# Patient Record
Sex: MALE | Race: BLACK OR AFRICAN AMERICAN | NOT HISPANIC OR LATINO | Employment: OTHER | URBAN - METROPOLITAN AREA
[De-identification: names, ages, dates, MRNs, and addresses within clinical notes are randomized per-mention and may not be internally consistent; named-entity substitution may affect disease eponyms.]

---

## 2017-02-07 ENCOUNTER — ALLSCRIPTS OFFICE VISIT (OUTPATIENT)
Dept: OTHER | Facility: OTHER | Age: 77
End: 2017-02-07

## 2017-02-07 DIAGNOSIS — R80.9 PROTEINURIA: ICD-10-CM

## 2017-08-10 ENCOUNTER — ALLSCRIPTS OFFICE VISIT (OUTPATIENT)
Dept: OTHER | Facility: OTHER | Age: 77
End: 2017-08-10

## 2017-08-10 DIAGNOSIS — R80.9 PROTEINURIA: ICD-10-CM

## 2018-01-13 VITALS
DIASTOLIC BLOOD PRESSURE: 70 MMHG | HEIGHT: 72 IN | RESPIRATION RATE: 16 BRPM | TEMPERATURE: 97.3 F | SYSTOLIC BLOOD PRESSURE: 140 MMHG | HEART RATE: 68 BPM | BODY MASS INDEX: 28.22 KG/M2 | WEIGHT: 208.38 LBS

## 2018-01-14 VITALS
SYSTOLIC BLOOD PRESSURE: 120 MMHG | HEART RATE: 68 BPM | HEIGHT: 72 IN | DIASTOLIC BLOOD PRESSURE: 80 MMHG | BODY MASS INDEX: 28.36 KG/M2 | WEIGHT: 209.38 LBS | RESPIRATION RATE: 16 BRPM | TEMPERATURE: 97.3 F

## 2018-06-12 ENCOUNTER — TELEPHONE (OUTPATIENT)
Dept: NEPHROLOGY | Facility: CLINIC | Age: 78
End: 2018-06-12

## 2018-06-12 NOTE — TELEPHONE ENCOUNTER
Erin Laurent from Dr Samuel King office called and stated patient is having a knee replacement on 7/30/18 and he needs a pre-op clearance but there is nothing in the schedule  Erin Laurent wants to know if Dr Tilford Habermann can clear the patient without seeing him  I did tell Natha Philips Dr Tilford Habermann is not her this week    Elham's phone number is [phone]

## 2018-06-18 RX ORDER — GLIPIZIDE 5 MG/1
1 TABLET ORAL DAILY
COMMUNITY
End: 2018-07-05 | Stop reason: ALTCHOICE

## 2018-06-18 RX ORDER — SIMVASTATIN 20 MG
1 TABLET ORAL DAILY
COMMUNITY
End: 2020-10-28 | Stop reason: SDUPTHER

## 2018-06-18 RX ORDER — METFORMIN HYDROCHLORIDE 500 MG/1
1500 TABLET, EXTENDED RELEASE ORAL
COMMUNITY
Start: 2018-04-02 | End: 2020-03-13 | Stop reason: SDUPTHER

## 2018-06-18 RX ORDER — AMLODIPINE BESYLATE 5 MG/1
TABLET ORAL
COMMUNITY
Start: 2018-03-27

## 2018-06-18 RX ORDER — SIMVASTATIN 20 MG
TABLET ORAL
COMMUNITY
Start: 2018-04-24 | End: 2018-07-05 | Stop reason: SDUPTHER

## 2018-06-18 RX ORDER — APIXABAN 5 MG/1
5 TABLET, FILM COATED ORAL 2 TIMES DAILY
COMMUNITY
Start: 2018-04-10 | End: 2020-02-04 | Stop reason: ALTCHOICE

## 2018-06-18 RX ORDER — ACETAMINOPHEN 500 MG
TABLET ORAL
COMMUNITY
End: 2022-04-16 | Stop reason: ALTCHOICE

## 2018-06-19 NOTE — TELEPHONE ENCOUNTER
He need to be cleared by primary doctor    I can clear him renal point of view once a get recent blood test without seeing him in the office

## 2018-07-02 LAB
CREAT ?TM UR-SCNC: 106 UMOL/L
HBA1C MFR BLD HPLC: 7.9 %
MICROALBUMIN UR-MCNC: 5.6 MG/L (ref 0–20)
MICROALBUMIN/CREAT UR: 53 MG/G{CREAT}

## 2018-07-05 ENCOUNTER — OFFICE VISIT (OUTPATIENT)
Dept: NEPHROLOGY | Facility: CLINIC | Age: 78
End: 2018-07-05
Payer: MEDICARE

## 2018-07-05 DIAGNOSIS — N18.30 STAGE 3 CHRONIC KIDNEY DISEASE (HCC): Primary | ICD-10-CM

## 2018-07-05 DIAGNOSIS — I82.A22 CHRONIC DEEP VEIN THROMBOSIS (DVT) OF AXILLARY VEIN OF LEFT UPPER EXTREMITY (HCC): ICD-10-CM

## 2018-07-05 DIAGNOSIS — E11.9 TYPE 2 DIABETES MELLITUS WITHOUT COMPLICATION, WITHOUT LONG-TERM CURRENT USE OF INSULIN (HCC): ICD-10-CM

## 2018-07-05 DIAGNOSIS — I49.5 SSS (SICK SINUS SYNDROME) (HCC): ICD-10-CM

## 2018-07-05 DIAGNOSIS — Z01.818 PRE-OPERATIVE CLEARANCE: ICD-10-CM

## 2018-07-05 DIAGNOSIS — I10 ESSENTIAL HYPERTENSION: ICD-10-CM

## 2018-07-05 PROBLEM — N18.2 STAGE 2 CHRONIC KIDNEY DISEASE: Status: ACTIVE | Noted: 2017-12-28

## 2018-07-05 PROBLEM — I82.A29 CHRONIC DEEP VEIN THROMBOSIS (DVT) OF AXILLARY VEIN (HCC): Status: ACTIVE | Noted: 2018-07-05

## 2018-07-05 PROCEDURE — 99213 OFFICE O/P EST LOW 20 MIN: CPT | Performed by: INTERNAL MEDICINE

## 2018-07-05 NOTE — PATIENT INSTRUCTIONS
Chronic Kidney Disease   AMBULATORY CARE:   Chronic kidney disease (CKD)  is the gradual and permanent loss of kidney function  Normally, the kidneys remove fluid, chemicals, and waste from your blood  These wastes are turned into urine by your kidneys  CKD may worsen over time and lead to kidney failure  Common symptoms include the following:   · Changes in how often you need to urinate    · Swelling in your arms, legs, or feet    · Shortness of breath    · Fatigue or weakness    · Bad or bitter taste in your mouth    · Nausea, vomiting, or loss of appetite  Seek care immediately if:   · You are confused and very drowsy  · You have a seizure  · You have shortness of breath  Contact your healthcare provider if:   · You suddenly gain or lose more weight than your healthcare provider has told you is okay  · You have itchy skin or a rash  · You urinate more or less than you normally do  · You have blood in your urine  · You have nausea and repeated vomiting  · You have fatigue or muscle weakness  · You have hiccups that will not stop  · You have questions or concerns about your condition or care  Treatment for CKD:  Medicines may be given to decrease blood pressure and get rid of extra fluid  You may also receive medicine to manage health conditions that may occur with CKD  Dialysis is a treatment to remove chemicals and waste from your blood when your kidneys can no longer do this  Surgery may be needed to create an arteriovenous fistula (AVF) in your arm or insert a catheter into your abdomen so that you can receive dialysis  A kidney transplant may be done if your CKD becomes severe  Manage CKD:   · Maintain a healthy weight  Ask your healthcare provider how much you should weigh  Ask him to help you create a weight loss plan if you are overweight  · Exercise 30 to 60 minutes a day, 4 to 7 times a week, or as directed  Ask about the best exercise plan for you   Regular exercise can help you manage CKD, high blood pressure, and diabetes  · Follow your healthcare provider's advice about what to eat and drink  He may tell you to eat food low in sodium (salt), potassium, phosphorus, or protein  You may need to see a dietitian if you need help planning meals  Ask how much liquid to drink each day and which liquids are best for you  · Limit alcohol  Ask how much alcohol is safe for you to drink  A drink of alcohol is 12 ounces of beer, 5 ounces of wine, or 1½ ounces of liquor  · Do not smoke  Nicotine and other chemicals in cigarettes and cigars can cause lung and kidney damage  Ask your healthcare provider for information if you currently smoke and need help to quit  E-cigarettes or smokeless tobacco still contain nicotine  Talk to your healthcare provider before you use these products  · Ask your healthcare provider if you need vaccines  Infections such as pneumonia, influenza, and hepatitis can be more harmful or more likely to occur in a person who has CKD  Vaccines reduce your risk of infection with these viruses  Follow up with your healthcare provider as directed:  Write down your questions so you remember to ask them during your visits  © 2017 [employer] [employer] Information is for End User's use only and may not be sold, redistributed or otherwise used for commercial purposes  All illustrations and images included in CareNotes® are the copyrighted property of [employer]  or [employer]ony [employer]  The above information is an  only  It is not intended as medical advice for individual conditions or treatments  Talk to your doctor, nurse or pharmacist before following any medical regimen to see if it is safe and effective for you

## 2018-07-05 NOTE — LETTER
July 5, 2018     Jameel Rosales MD  [address] [place] [address] 89    Patient: Dalton Ventura   YOB: 1940   Date of Visit: 7/5/2018       Dear Dr Hitesh Washington: Thank you for referring Cassidy Cohn to me for evaluation  Below are my notes for this consultation  If you have questions, please do not hesitate to call me  I look forward to following your patient along with you  Sincerely,        Marcus Collins MD        CC: MD Marcus Barnett MD  7/5/2018 11:41 AM  Sign at close encounter  [address] 70 y o  male MRN: [id number]    Encounter: [id number] 7/5/2018    REASON FOR VISIT: Dalton Ventura is a 70 y o  male who is here on 7/5/2018 for No chief complaint on file  Kacie Garcia HPI:    Denny Millan came in today for renal clearance for his knee replacement surgery  [age] a gentleman who is doing quite well  Other than pain in the knee there is no acute complaint  He does have sick sinus syndrome and has a pacemaker being closely monitored by cardiologist    No acute complaint no urinary or bowel problem no chest pain or palpitation        REVIEW OF SYSTEMS:    Review of Systems   Constitutional: Negative for activity change and fatigue  HENT: Negative for congestion and ear discharge  Eyes: Negative for photophobia and pain  Respiratory: Negative for apnea, choking, chest tightness and shortness of breath  Cardiovascular: Negative for chest pain and palpitations  Gastrointestinal: Negative for abdominal distention and blood in stool  Endocrine: Negative for heat intolerance and polyphagia  Genitourinary: Negative for difficulty urinating, flank pain and urgency  Musculoskeletal: Positive for arthralgias  Negative for neck pain and neck stiffness  Skin: Negative for color change and wound  Allergic/Immunologic: Negative for food allergies and immunocompromised state     Neurological: Negative for seizures, facial asymmetry, light-headedness and headaches  Hematological: Negative for adenopathy  Does not bruise/bleed easily  Psychiatric/Behavioral: Negative for self-injury and suicidal ideas  PAST MEDICAL HISTORY:  No past medical history on file  PAST SURGICAL HISTORY:  No past surgical history on file  SOCIAL HISTORY:  History   Alcohol use Not on file     History   Drug use: Unknown     History   Smoking Status    Not on file   Smokeless Tobacco    Not on file       FAMILY HISTORY:  No family history on file  MEDICATIONS:    Current Outpatient Prescriptions:     acetaminophen (TYLENOL) 500 mg tablet, Take by mouth, Disp: , Rfl:     amLODIPine (NORVASC) 5 mg tablet, , Disp: , Rfl:     ELIQUIS 5 MG, , Disp: , Rfl:     Linagliptin (TRADJENTA) 5 MG TABS, Take 5 mg by mouth daily, Disp: , Rfl:     metFORMIN (GLUCOPHAGE-XR) 500 mg 24 hr tablet, 1,000 mg  , Disp: , Rfl:     Multiple Vitamins-Minerals (MULTIVITAMIN ADULT PO), Take 1 tablet by mouth daily, Disp: , Rfl:     simvastatin (ZOCOR) 20 mg tablet, Take 1 tablet by mouth daily, Disp: , Rfl:     PHYSICAL EXAM:  There were no vitals filed for this visit  There is no height or weight on file to calculate BMI  Physical Exam   Constitutional: He is oriented to person, place, and time  He appears well-developed  No distress  HENT:   Head: Normocephalic  Mouth/Throat: Oropharynx is clear and moist    Eyes: Conjunctivae are normal  No scleral icterus  Neck: Neck supple  No JVD present  Cardiovascular: Normal rate and normal heart sounds  Pulmonary/Chest: Effort normal  He has no wheezes  Abdominal: Soft  There is no tenderness  Musculoskeletal: Normal range of motion  He exhibits no edema  Neurological: He is alert and oriented to person, place, and time  Skin: Skin is warm  No rash noted  Psychiatric: He has a normal mood and affect   His behavior is normal        LAB RESULTS:  No results found for this or any previous visit     ASSESSMENT and PLAN:      Stage 3 chronic kidney disease  Stable at this point with GFR of 56  Likely etiology is hypertensive nephrosclerosis    Pre-operative clearance  Patient is medically stable renal point of view for knee surgery  Advised hydration around surgery  Will advise avoidance of any nonsteroidal pain killer including Mobic        I will see him back in 6 months again      Portions of the record may have been created with voice recognition software  Occasional wrong word or "sound a like" substitutions may have occurred due to the inherent limitations of voice recognition software  Read the chart carefully and recognize, using context, where substitutions have occurred  If you have any questions, please contact the dictating provider

## 2018-07-05 NOTE — PROGRESS NOTES
NEPHROLOGY OFFICE FOLLOW UP  Mark De La Cruz 70 y o  male MRN: [id number]    Encounter: [id number] 7/5/2018    REASON FOR VISIT: Mark De La Cruz is a 70 y o  male who is here on 7/5/2018 for No chief complaint on file  Maddy Red HPI:    Nuvia Goldstein came in today for renal clearance for his knee replacement surgery  [age] a gentleman who is doing quite well  Other than pain in the knee there is no acute complaint  He does have sick sinus syndrome and has a pacemaker being closely monitored by cardiologist    No acute complaint no urinary or bowel problem no chest pain or palpitation        REVIEW OF SYSTEMS:    Review of Systems   Constitutional: Negative for activity change and fatigue  HENT: Negative for congestion and ear discharge  Eyes: Negative for photophobia and pain  Respiratory: Negative for apnea, choking, chest tightness and shortness of breath  Cardiovascular: Negative for chest pain and palpitations  Gastrointestinal: Negative for abdominal distention and blood in stool  Endocrine: Negative for heat intolerance and polyphagia  Genitourinary: Negative for difficulty urinating, flank pain and urgency  Musculoskeletal: Positive for arthralgias  Negative for neck pain and neck stiffness  Skin: Negative for color change and wound  Allergic/Immunologic: Negative for food allergies and immunocompromised state  Neurological: Negative for seizures, facial asymmetry, light-headedness and headaches  Hematological: Negative for adenopathy  Does not bruise/bleed easily  Psychiatric/Behavioral: Negative for self-injury and suicidal ideas  PAST MEDICAL HISTORY:  No past medical history on file  PAST SURGICAL HISTORY:  No past surgical history on file      SOCIAL HISTORY:  History   Alcohol use Not on file     History   Drug use: Unknown     History   Smoking Status    Not on file   Smokeless Tobacco    Not on file       FAMILY HISTORY:  No family history on file     MEDICATIONS:    Current Outpatient Prescriptions:     acetaminophen (TYLENOL) 500 mg tablet, Take by mouth, Disp: , Rfl:     amLODIPine (NORVASC) 5 mg tablet, , Disp: , Rfl:     ELIQUIS 5 MG, , Disp: , Rfl:     Linagliptin (TRADJENTA) 5 MG TABS, Take 5 mg by mouth daily, Disp: , Rfl:     metFORMIN (GLUCOPHAGE-XR) 500 mg 24 hr tablet, 1,000 mg  , Disp: , Rfl:     Multiple Vitamins-Minerals (MULTIVITAMIN ADULT PO), Take 1 tablet by mouth daily, Disp: , Rfl:     simvastatin (ZOCOR) 20 mg tablet, Take 1 tablet by mouth daily, Disp: , Rfl:     PHYSICAL EXAM:  There were no vitals filed for this visit  There is no height or weight on file to calculate BMI  Physical Exam   Constitutional: He is oriented to person, place, and time  He appears well-developed  No distress  HENT:   Head: Normocephalic  Mouth/Throat: Oropharynx is clear and moist    Eyes: Conjunctivae are normal  No scleral icterus  Neck: Neck supple  No JVD present  Cardiovascular: Normal rate and normal heart sounds  Pulmonary/Chest: Effort normal  He has no wheezes  Abdominal: Soft  There is no tenderness  Musculoskeletal: Normal range of motion  He exhibits no edema  Neurological: He is alert and oriented to person, place, and time  Skin: Skin is warm  No rash noted  Psychiatric: He has a normal mood and affect  His behavior is normal        LAB RESULTS:  No results found for this or any previous visit  ASSESSMENT and PLAN:      Stage 3 chronic kidney disease  Stable at this point with GFR of 56  Likely etiology is hypertensive nephrosclerosis    Pre-operative clearance  Patient is medically stable renal point of view for knee surgery  Advised hydration around surgery  Will advise avoidance of any nonsteroidal pain killer including Mobic        I will see him back in 6 months again      Portions of the record may have been created with voice recognition software   Occasional wrong word or "sound a like" substitutions may have occurred due to the inherent limitations of voice recognition software  Read the chart carefully and recognize, using context, where substitutions have occurred  If you have any questions, please contact the dictating provider

## 2018-07-05 NOTE — ASSESSMENT & PLAN NOTE
Patient is medically stable renal point of view for knee surgery  Advised hydration around surgery    Will advise avoidance of any nonsteroidal pain killer including Mobic

## 2018-07-20 LAB
AST SERPL W P-5'-P-CCNC: 12 U/L (ref 5–45)
CHOLEST SERPL-MCNC: 157 MG/DL (ref 50–200)
CREAT ?TM UR-SCNC: 106 UMOL/L
EXT BLOOD, UA: ABNORMAL
EXT GLUCOSE BLD: 157
EXT GLUCOSE, UA: ABNORMAL
EXT KETONES: ABNORMAL
EXT NITRITE, UA: ABNORMAL
EXT PH, UA: 5
EXT PROTEIN, UA: ABNORMAL
EXT SPECIFIC GRAVITY, UA: 1.01
EXTERNAL ALBUMIN: 4.3
EXTERNAL ALK PHOS: 75
EXTERNAL ALT: 13
EXTERNAL ANION GAP: 5
EXTERNAL BICARBONATE: 27
EXTERNAL BUN: 14
EXTERNAL CALCIUM: 9.4
EXTERNAL CHLORIDE: 108
EXTERNAL CREATININE: 1.26
EXTERNAL EGFR: 55
EXTERNAL POTASSIUM: 4
EXTERNAL RBC (UA): ABNORMAL
EXTERNAL SODIUM: 140
EXTERNAL T.BILIRUBIN: 0.6
EXTERNAL TOTAL PROTEIN: 7.6
EXTERNAL WBC (UA): ABNORMAL
HBA1C MFR BLD: 7.9 % (ref 4.2–6.3)
HCT VFR BLD AUTO: 42.5 % (ref 36.5–49.3)
HDLC SERPL-MCNC: 57 MG/DL (ref 40–60)
HGB BLD-MCNC: 14 G/DL (ref 12–17)
LDLC SERPL CALC-MCNC: 85 MG/DL (ref 0–100)
MICROALBUMIN UR-MCNC: 5.6 MG/L (ref 0–20)
MICROALBUMIN/CREAT UR: 53 MG/G{CREAT}
PLATELET # BLD AUTO: 261 THOUSANDS/UL (ref 149–390)
PROTHROMBIN TIME: 12.9 SEC (ref 11.8–14.1)
PSA FREE + TOTAL (HISTORICAL): 1.8
TRIGL SERPL-MCNC: 73 MG/DL (ref ?–150)
WBC # BLD AUTO: 5.5 THOUSAND/UL
WBC # BLD EST: ABNORMAL 10*3/UL

## 2018-10-19 ENCOUNTER — TELEPHONE (OUTPATIENT)
Dept: NEPHROLOGY | Facility: CLINIC | Age: 78
End: 2018-10-19

## 2018-10-19 NOTE — TELEPHONE ENCOUNTER
Called and left message on machine for patient to return our call due to Dr Lesa Velasquez on vacation 1/10/2019

## 2019-01-31 ENCOUNTER — OFFICE VISIT (OUTPATIENT)
Dept: NEPHROLOGY | Facility: CLINIC | Age: 79
End: 2019-01-31
Payer: MEDICARE

## 2019-01-31 VITALS — HEIGHT: 74 IN | BODY MASS INDEX: 25.15 KG/M2 | WEIGHT: 196 LBS | TEMPERATURE: 97.5 F

## 2019-01-31 DIAGNOSIS — I82.A22 CHRONIC DEEP VEIN THROMBOSIS (DVT) OF AXILLARY VEIN OF LEFT UPPER EXTREMITY (HCC): ICD-10-CM

## 2019-01-31 DIAGNOSIS — N18.30 STAGE 3 CHRONIC KIDNEY DISEASE (HCC): Primary | ICD-10-CM

## 2019-01-31 DIAGNOSIS — I10 ESSENTIAL HYPERTENSION: ICD-10-CM

## 2019-01-31 DIAGNOSIS — E11.9 TYPE 2 DIABETES MELLITUS WITHOUT COMPLICATION, WITHOUT LONG-TERM CURRENT USE OF INSULIN (HCC): ICD-10-CM

## 2019-01-31 PROCEDURE — 99213 OFFICE O/P EST LOW 20 MIN: CPT | Performed by: INTERNAL MEDICINE

## 2019-01-31 NOTE — PATIENT INSTRUCTIONS
Chronic Kidney Disease   AMBULATORY CARE:   Chronic kidney disease (CKD)  is the gradual and permanent loss of kidney function  Normally, the kidneys remove fluid, chemicals, and waste from your blood  These wastes are turned into urine by your kidneys  CKD may worsen over time and lead to kidney failure  Common symptoms include the following:   · Changes in how often you need to urinate    · Swelling in your arms, legs, or feet    · Shortness of breath    · Fatigue or weakness    · Bad or bitter taste in your mouth    · Nausea, vomiting, or loss of appetite  Seek care immediately if:   · You are confused and very drowsy  · You have a seizure  · You have shortness of breath  Contact your healthcare provider if:   · You suddenly gain or lose more weight than your healthcare provider has told you is okay  · You have itchy skin or a rash  · You urinate more or less than you normally do  · You have blood in your urine  · You have nausea and repeated vomiting  · You have fatigue or muscle weakness  · You have hiccups that will not stop  · You have questions or concerns about your condition or care  Treatment for CKD:  Medicines may be given to decrease blood pressure and get rid of extra fluid  You may also receive medicine to manage health conditions that may occur with CKD  Dialysis is a treatment to remove chemicals and waste from your blood when your kidneys can no longer do this  Surgery may be needed to create an arteriovenous fistula (AVF) in your arm or insert a catheter into your abdomen so that you can receive dialysis  A kidney transplant may be done if your CKD becomes severe  Manage CKD:   · Maintain a healthy weight  Ask your healthcare provider how much you should weigh  Ask him to help you create a weight loss plan if you are overweight  · Exercise 30 to 60 minutes a day, 4 to 7 times a week, or as directed  Ask about the best exercise plan for you   Regular exercise can help you manage CKD, high blood pressure, and diabetes  · Follow your healthcare provider's advice about what to eat and drink  He may tell you to eat food low in sodium (salt), potassium, phosphorus, or protein  You may need to see a dietitian if you need help planning meals  Ask how much liquid to drink each day and which liquids are best for you  · Limit alcohol  Ask how much alcohol is safe for you to drink  A drink of alcohol is 12 ounces of beer, 5 ounces of wine, or 1½ ounces of liquor  · Do not smoke  Nicotine and other chemicals in cigarettes and cigars can cause lung and kidney damage  Ask your healthcare provider for information if you currently smoke and need help to quit  E-cigarettes or smokeless tobacco still contain nicotine  Talk to your healthcare provider before you use these products  · Ask your healthcare provider if you need vaccines  Infections such as pneumonia, influenza, and hepatitis can be more harmful or more likely to occur in a person who has CKD  Vaccines reduce your risk of infection with these viruses  Follow up with your healthcare provider as directed:  Write down your questions so you remember to ask them during your visits  © 2017 [employer] Information is for End User's use only and may not be sold, redistributed or otherwise used for commercial purposes  All illustrations and images included in CareNotes® are the copyrighted property of [employer]  or [employer]  The above information is an  only  It is not intended as medical advice for individual conditions or treatments  Talk to your doctor, nurse or pharmacist before following any medical regimen to see if it is safe and effective for you

## 2019-01-31 NOTE — LETTER
January 31, 2019     Anaid Gomez MD  [address] [place] [address]    Patient: Khanh Neal   YOB: 1940   Date of Visit: 1/31/2019       Dear Dr Vivien Dallas: Thank you for referring Angelo De to me for evaluation  Below are my notes for this consultation  If you have questions, please do not hesitate to call me  I look forward to following your patient along with you  Sincerely,        Howard Trujillo MD        CC: No Recipients  Howard Trujillo MD  1/31/2019  9:17 AM  Sign at close encounter  [address] [place] [address] 70 y o  male MRN: [id number]    Encounter: [id number] 1/31/2019    REASON FOR VISIT: Khanh Neal is a 70 y o  male who is here on 1/31/2019 for Chronic Kidney Disease    HPI:    [facility] came in today for follow-up of CKD stage 3  Since I saw him last he had knee replacement done  Still complaining of knee pain and overall not feeling better  Denies any other complaint        REVIEW OF SYSTEMS:    Review of Systems   Constitutional: Negative for activity change and fatigue  HENT: Negative for congestion and ear discharge  Eyes: Negative for photophobia and pain  Respiratory: Negative for apnea, cough, choking, chest tightness and shortness of breath  Cardiovascular: Negative for chest pain, palpitations and leg swelling  Gastrointestinal: Negative for abdominal distention, abdominal pain, blood in stool, diarrhea and nausea  Endocrine: Negative for heat intolerance and polyphagia  Genitourinary: Negative for flank pain and urgency  Musculoskeletal: Positive for arthralgias  Negative for neck pain and neck stiffness  Skin: Negative for color change and wound  Allergic/Immunologic: Negative for food allergies and immunocompromised state  Neurological: Negative for seizures and facial asymmetry  Hematological: Negative for adenopathy  Does not bruise/bleed easily     Psychiatric/Behavioral: Negative for self-injury and suicidal ideas  PAST MEDICAL HISTORY:  Past Medical History:   Diagnosis Date    Chronic kidney disease     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)     Diabetes mellitus (HCC)     Hypertension     SSS (sick sinus syndrome) (HCC)        PAST SURGICAL HISTORY:  Past Surgical History:   Procedure Laterality Date    CARDIAC PACEMAKER PLACEMENT      CATARACT EXTRACTION      COLONOSCOPY      CYSTOSTOMY      REPLACEMENT TOTAL KNEE         SOCIAL HISTORY:  History   Alcohol Use No     History   Drug Use No     History   Smoking Status    Former Smoker   Smokeless Tobacco    Never Used       FAMILY HISTORY:  Family History   Problem Relation Age of Onset    No Known Problems Mother     No Known Problems Father        MEDICATIONS:    Current Outpatient Prescriptions:     acetaminophen (TYLENOL) 500 mg tablet, Take by mouth, Disp: , Rfl:     amLODIPine (NORVASC) 5 mg tablet, , Disp: , Rfl:     ELIQUIS 5 MG, Take 5 mg by mouth 2 (two) times a day  , Disp: , Rfl:     metFORMIN (GLUCOPHAGE-XR) 500 mg 24 hr tablet, Take 1,500 mg by mouth daily with breakfast  , Disp: , Rfl:     Multiple Vitamins-Minerals (MULTIVITAMIN ADULT PO), Take 1 tablet by mouth daily, Disp: , Rfl:     saxagliptin (ONGLYZA) 5 MG tablet, Take 5 mg by mouth daily, Disp: , Rfl:     simvastatin (ZOCOR) 20 mg tablet, Take 1 tablet by mouth daily, Disp: , Rfl:     PHYSICAL EXAM:  Vitals:    01/31/19 0836   Temp: 97 5 °F (36 4 °C)   Weight: 88 9 kg (196 lb)   Height: 6' 2" (1 88 m)     Body mass index is 25 16 kg/m²  Physical Exam   Constitutional: He is oriented to person, place, and time  He appears well-developed  No distress  HENT:   Head: Normocephalic  Mouth/Throat: Oropharynx is clear and moist    Eyes: Conjunctivae are normal  No scleral icterus  Neck: Neck supple  No JVD present  Cardiovascular: Normal rate and normal heart sounds      Pulmonary/Chest: Effort normal and breath sounds normal  No respiratory distress  He has no wheezes  He has no rales  Abdominal: Soft  Bowel sounds are normal  He exhibits no distension  There is no tenderness  There is no guarding  Musculoskeletal: Normal range of motion  He exhibits no edema  Neurological: He is alert and oriented to person, place, and time  Skin: Skin is warm  No rash noted  Psychiatric: He has a normal mood and affect  His behavior is normal        LAB RESULTS:  Results for orders placed or performed in visit on 07/20/18   Protime-INR   Result Value Ref Range    PT 12 9 11 8 - 14 1 sec   Comprehensive metabolic panel   Result Value Ref Range    SODIUM 140     POTASSIUM 4 0     CHLORIDE 108     BICARB 27     ANION GAP 5     BUN 14     CREATININE 1 26     Glucose 157     EXTERNAL CALCIUM 9 4     TOTAL PROTEIN 7 6     ALBUMIN 4 3     AST 12 5 - 45 U/L    ALT 13     ALK PHOS 75     EXTERNAL TOT  BILIRUBIN 0 6     EXTERNAL EGFR 55    Hemoglobin A1C   Result Value Ref Range    Hemoglobin A1C 7 9 (A) 4 2 - 6 3 %   Lipid panel   Result Value Ref Range    Cholesterol 157 50 - 200 mg/dL    Triglycerides 73 150 mg/dL    Total HDL  Direct 57 40 - 60 mg/dL    LDL Calculated 85 0 - 100 mg/dL   PSA Total (Reflex To Free)   Result Value Ref Range    PSA FREE + TOTAL 1 80    Microalbumin / creatinine urine ratio   Result Value Ref Range    EXT Creatinine Urine 106 0     Microalbum  ,U,Random 5 6 0 0 - 20 0 mg/L    EXTERNAL Microalb/Creat Ratio 53    Urinalysis with microscopic   Result Value Ref Range    Spec Grav, UA (Ref:1 003-1 030) 1 013     Glucose, UA (Ref: Negative)      Ketones, UA (Ref: Negative) neg     Blood, UA neg     Nitrite, UA (Ref: Negative) neg      Leukocytes, UA (Ref: Negative) neg     pH, UA (Ref: 4 5-8 0) 5 0     Protein, UA (Ref: Negative) 30-70     RBC, UA 3-5     EXTERNAL WBC, UA RARE    CBC   Result Value Ref Range    Hemoglobin 14 0 12 0 - 17 0 g/dL    Hematocrit 42 5 36 5 - 49 3 %    WBC 5 50 Thousand/uL    Platelets 584 206 - 390 Thousands/uL       ASSESSMENT and PLAN:      Stage 3 chronic kidney disease  Stable with GFR of 59-60  Likely etiology is hypertensive nephrosclerosis  Hydration and avoidance of nephrotoxic medicine discussed with him    Essential hypertension  Very well controlled with present medication which I will continue        I will see him back in 1 year again as he is quite stable      Portions of the record may have been created with voice recognition software  Occasional wrong word or "sound a like" substitutions may have occurred due to the inherent limitations of voice recognition software  Read the chart carefully and recognize, using context, where substitutions have occurred  If you have any questions, please contact the dictating provider

## 2019-01-31 NOTE — ASSESSMENT & PLAN NOTE
Stable with GFR of 59-60  Likely etiology is hypertensive nephrosclerosis    Hydration and avoidance of nephrotoxic medicine discussed with him

## 2019-01-31 NOTE — PROGRESS NOTES
NEPHROLOGY OFFICE FOLLOW UP  Glorine Gaucher 70 y o  male MRN: [id number]    Encounter: [id number] 1/31/2019    REASON FOR VISIT: Glorine Gaucher is a 70 y o  male who is here on 1/31/2019 for Chronic Kidney Disease    HPI:    Brit Lopez came in today for follow-up of CKD stage 3  Since I saw him last he had knee replacement done  Still complaining of knee pain and overall not feeling better  Denies any other complaint        REVIEW OF SYSTEMS:    Review of Systems   Constitutional: Negative for activity change and fatigue  HENT: Negative for congestion and ear discharge  Eyes: Negative for photophobia and pain  Respiratory: Negative for apnea, cough, choking, chest tightness and shortness of breath  Cardiovascular: Negative for chest pain, palpitations and leg swelling  Gastrointestinal: Negative for abdominal distention, abdominal pain, blood in stool, diarrhea and nausea  Endocrine: Negative for heat intolerance and polyphagia  Genitourinary: Negative for flank pain and urgency  Musculoskeletal: Positive for arthralgias  Negative for neck pain and neck stiffness  Skin: Negative for color change and wound  Allergic/Immunologic: Negative for food allergies and immunocompromised state  Neurological: Negative for seizures and facial asymmetry  Hematological: Negative for adenopathy  Does not bruise/bleed easily  Psychiatric/Behavioral: Negative for self-injury and suicidal ideas           PAST MEDICAL HISTORY:  Past Medical History:   Diagnosis Date    Chronic kidney disease     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)     Diabetes mellitus ([facility] [address] )     Hypertension     SSS (sick sinus syndrome) ([facility] )        PAST SURGICAL HISTORY:  Past Surgical History:   Procedure Laterality Date    CARDIAC PACEMAKER PLACEMENT      CATARACT EXTRACTION      COLONOSCOPY      CYSTOSTOMY      REPLACEMENT TOTAL KNEE         SOCIAL HISTORY:  History   Alcohol Use No     History   Drug Use No     History   Smoking Status    Former Smoker   Smokeless Tobacco    Never Used       FAMILY HISTORY:  Family History   Problem Relation Age of Onset    No Known Problems Mother     No Known Problems Father        MEDICATIONS:    Current Outpatient Prescriptions:     acetaminophen (TYLENOL) 500 mg tablet, Take by mouth, Disp: , Rfl:     amLODIPine (NORVASC) 5 mg tablet, , Disp: , Rfl:     ELIQUIS 5 MG, Take 5 mg by mouth 2 (two) times a day  , Disp: , Rfl:     metFORMIN (GLUCOPHAGE-XR) 500 mg 24 hr tablet, Take 1,500 mg by mouth daily with breakfast  , Disp: , Rfl:     Multiple Vitamins-Minerals (MULTIVITAMIN ADULT PO), Take 1 tablet by mouth daily, Disp: , Rfl:     saxagliptin (ONGLYZA) 5 MG tablet, Take 5 mg by mouth daily, Disp: , Rfl:     simvastatin (ZOCOR) 20 mg tablet, Take 1 tablet by mouth daily, Disp: , Rfl:     PHYSICAL EXAM:  Vitals:    01/31/19 0836   Temp: 97 5 °F (36 4 °C)   Weight: 88 9 kg (196 lb)   Height: 6' 2" (1 88 m)     Body mass index is 25 16 kg/m²  Physical Exam   Constitutional: He is oriented to person, place, and time  He appears well-developed  No distress  HENT:   Head: Normocephalic  Mouth/Throat: Oropharynx is clear and moist    Eyes: Conjunctivae are normal  No scleral icterus  Neck: Neck supple  No JVD present  Cardiovascular: Normal rate and normal heart sounds  Pulmonary/Chest: Effort normal and breath sounds normal  No respiratory distress  He has no wheezes  He has no rales  Abdominal: Soft  Bowel sounds are normal  He exhibits no distension  There is no tenderness  There is no guarding  Musculoskeletal: Normal range of motion  He exhibits no edema  Neurological: He is alert and oriented to person, place, and time  Skin: Skin is warm  No rash noted  Psychiatric: He has a normal mood and affect   His behavior is normal        LAB RESULTS:  Results for orders placed or performed in visit on 07/20/18   Protime-INR   Result Value Ref Range PT 12 9 11 8 - 14 1 sec   Comprehensive metabolic panel   Result Value Ref Range    SODIUM 140     POTASSIUM 4 0     CHLORIDE 108     BICARB 27     ANION GAP 5     BUN 14     CREATININE 1 26     Glucose 157     EXTERNAL CALCIUM 9 4     TOTAL PROTEIN 7 6     ALBUMIN 4 3     AST 12 5 - 45 U/L    ALT 13     ALK PHOS 75     EXTERNAL TOT  BILIRUBIN 0 6     EXTERNAL EGFR 55    Hemoglobin A1C   Result Value Ref Range    Hemoglobin A1C 7 9 (A) 4 2 - 6 3 %   Lipid panel   Result Value Ref Range    Cholesterol 157 50 - 200 mg/dL    Triglycerides 73 150 mg/dL    Total HDL  Direct 57 40 - 60 mg/dL    LDL Calculated 85 0 - 100 mg/dL   PSA Total (Reflex To Free)   Result Value Ref Range    PSA FREE + TOTAL 1 80    Microalbumin / creatinine urine ratio   Result Value Ref Range    EXT Creatinine Urine 106 0     Microalbum  ,U,Random 5 6 0 0 - 20 0 mg/L    EXTERNAL Microalb/Creat Ratio 53    Urinalysis with microscopic   Result Value Ref Range    Spec Grav, UA (Ref:1 003-1 030) 1 013     Glucose, UA (Ref: Negative)      Ketones, UA (Ref: Negative) neg     Blood, UA neg     Nitrite, UA (Ref: Negative) neg      Leukocytes, UA (Ref: Negative) neg     pH, UA (Ref: 4 5-8 0) 5 0     Protein, UA (Ref: Negative) 30-70     RBC, UA 3-5     EXTERNAL WBC, UA RARE    CBC   Result Value Ref Range    Hemoglobin 14 0 12 0 - 17 0 g/dL    Hematocrit 42 5 36 5 - 49 3 %    WBC 5 50 Thousand/uL    Platelets 108 108 - 622 Thousands/uL       ASSESSMENT and PLAN:      Stage 3 chronic kidney disease  Stable with GFR of 59-60  Likely etiology is hypertensive nephrosclerosis  Hydration and avoidance of nephrotoxic medicine discussed with him    Essential hypertension  Very well controlled with present medication which I will continue        I will see him back in 1 year again as he is quite stable      Portions of the record may have been created with voice recognition software   Occasional wrong word or "sound a like" substitutions may have occurred due to the inherent limitations of voice recognition software  Read the chart carefully and recognize, using context, where substitutions have occurred  If you have any questions, please contact the dictating provider

## 2020-01-27 LAB
CREAT ?TM UR-SCNC: 85.5 UMOL/L
EXT PROTEIN URINE: 19.8
PROT/CREAT UR: 0.23 MG/G{CREAT}

## 2020-01-31 ENCOUNTER — TELEPHONE (OUTPATIENT)
Dept: NEPHROLOGY | Facility: CLINIC | Age: 80
End: 2020-01-31

## 2020-01-31 ENCOUNTER — DOCUMENTATION (OUTPATIENT)
Dept: NEPHROLOGY | Facility: CLINIC | Age: 80
End: 2020-01-31

## 2020-01-31 LAB
CREAT ?TM UR-SCNC: 85.5 UMOL/L
EXT DIFF-ABS BASOPHILS: 0
EXT DIFF-ABS EOSINOPHILS: 0.1
EXT DIFF-ABS LYMPHOCYTES: 1.6
EXT DIFF-ABS MONOCYTES: 0.4
EXT DIFF-ABS NEUTROPHILS: 3.3
EXT GLUCOSE BLD: 144
EXT PROTEIN URINE: 19.8
EXTERNAL ANION GAP: 3
EXTERNAL BUN: 12
EXTERNAL CALCIUM: 9.1
EXTERNAL CHLORIDE: 108
EXTERNAL CO2: 29
EXTERNAL CREATININE: 1.22
EXTERNAL EGFR: 56
EXTERNAL HEMATOCRIT: 41 %
EXTERNAL HEMOGLOBIN: 13.3 G/DL
EXTERNAL MCV: 82
EXTERNAL PHOSPHORUS: 2.6
EXTERNAL PLATELET COUNT: 300 K/ΜL
EXTERNAL POTASSIUM: 4
EXTERNAL PTH: 42.6
EXTERNAL RBC: 4.99
EXTERNAL RDW: 14.7
EXTERNAL SODIUM: 140
EXTERNAL WBC: 5.4
HGB UR QL STRIP.AUTO: NEGATIVE
KETONES UR STRIP-MCNC: NEGATIVE MG/DL
NITRITE UR QL STRIP: NEGATIVE
PH, 24 HR URINE (HISTORICAL): 6
PROT UR STRIP-MCNC: NEGATIVE MG/DL
PROT/CREAT UR: 0.23 MG/G{CREAT}
SP GR UR: 1.01
WBC # BLD AUTO: 0 THOUSAND/UL

## 2020-01-31 NOTE — TELEPHONE ENCOUNTER
Patient of Dr Lucy Mcgee wife called stating that the patient had is blood work done on Monday 1/27/2020 @ [facility] [id number],

## 2020-02-04 ENCOUNTER — OFFICE VISIT (OUTPATIENT)
Dept: NEPHROLOGY | Facility: CLINIC | Age: 80
End: 2020-02-04
Payer: MEDICARE

## 2020-02-04 VITALS
RESPIRATION RATE: 16 BRPM | HEIGHT: 72 IN | HEART RATE: 68 BPM | BODY MASS INDEX: 27.41 KG/M2 | TEMPERATURE: 96.4 F | SYSTOLIC BLOOD PRESSURE: 140 MMHG | DIASTOLIC BLOOD PRESSURE: 80 MMHG | WEIGHT: 202.4 LBS

## 2020-02-04 DIAGNOSIS — I48.20 CHRONIC ATRIAL FIBRILLATION (HCC): ICD-10-CM

## 2020-02-04 DIAGNOSIS — E11.9 TYPE 2 DIABETES MELLITUS WITHOUT COMPLICATION, WITHOUT LONG-TERM CURRENT USE OF INSULIN (HCC): ICD-10-CM

## 2020-02-04 DIAGNOSIS — I10 ESSENTIAL HYPERTENSION: ICD-10-CM

## 2020-02-04 DIAGNOSIS — N18.30 STAGE 3 CHRONIC KIDNEY DISEASE (HCC): Primary | ICD-10-CM

## 2020-02-04 DIAGNOSIS — E83.9 CHRONIC KIDNEY DISEASE-MINERAL AND BONE DISORDER: ICD-10-CM

## 2020-02-04 DIAGNOSIS — N18.9 CHRONIC KIDNEY DISEASE-MINERAL AND BONE DISORDER: ICD-10-CM

## 2020-02-04 DIAGNOSIS — M89.9 CHRONIC KIDNEY DISEASE-MINERAL AND BONE DISORDER: ICD-10-CM

## 2020-02-04 DIAGNOSIS — I82.A22 CHRONIC DEEP VEIN THROMBOSIS (DVT) OF AXILLARY VEIN OF LEFT UPPER EXTREMITY (HCC): ICD-10-CM

## 2020-02-04 DIAGNOSIS — I49.5 SSS (SICK SINUS SYNDROME) (HCC): ICD-10-CM

## 2020-02-04 PROCEDURE — 3066F NEPHROPATHY DOC TX: CPT | Performed by: INTERNAL MEDICINE

## 2020-02-04 PROCEDURE — 1036F TOBACCO NON-USER: CPT | Performed by: INTERNAL MEDICINE

## 2020-02-04 PROCEDURE — 1160F RVW MEDS BY RX/DR IN RCRD: CPT | Performed by: INTERNAL MEDICINE

## 2020-02-04 PROCEDURE — 3079F DIAST BP 80-89 MM HG: CPT | Performed by: INTERNAL MEDICINE

## 2020-02-04 PROCEDURE — 99214 OFFICE O/P EST MOD 30 MIN: CPT | Performed by: INTERNAL MEDICINE

## 2020-02-04 PROCEDURE — 3008F BODY MASS INDEX DOCD: CPT | Performed by: INTERNAL MEDICINE

## 2020-02-04 PROCEDURE — 3077F SYST BP >= 140 MM HG: CPT | Performed by: INTERNAL MEDICINE

## 2020-02-04 RX ORDER — WARFARIN SODIUM 7.5 MG/1
TABLET ORAL
COMMUNITY
Start: 2019-11-27 | End: 2020-02-04 | Stop reason: ALTCHOICE

## 2020-02-04 RX ORDER — WARFARIN SODIUM 7.5 MG/1
7.5 TABLET ORAL
COMMUNITY

## 2020-02-04 NOTE — ASSESSMENT & PLAN NOTE
Rate is very well control he is being closely monitored by cardiologist   He is on also a anticoagulation for in form of warfarin

## 2020-02-04 NOTE — PATIENT INSTRUCTIONS
Chronic Kidney Disease   AMBULATORY CARE:   Chronic kidney disease (CKD)  is the gradual and permanent loss of kidney function  Normally, the kidneys remove fluid, chemicals, and waste from your blood  These wastes are turned into urine by your kidneys  CKD may worsen over time and lead to kidney failure  Common symptoms include the following:   · Changes in how often you need to urinate    · Swelling in your arms, legs, or feet    · Shortness of breath    · Fatigue or weakness    · Bad or bitter taste in your mouth    · Nausea, vomiting, or loss of appetite  Seek care immediately if:   · You are confused and very drowsy  · You have a seizure  · You have shortness of breath  Contact your healthcare provider if:   · You suddenly gain or lose more weight than your healthcare provider has told you is okay  · You have itchy skin or a rash  · You urinate more or less than you normally do  · You have blood in your urine  · You have nausea and repeated vomiting  · You have fatigue or muscle weakness  · You have hiccups that will not stop  · You have questions or concerns about your condition or care  Treatment for CKD:  Medicines may be given to decrease blood pressure and get rid of extra fluid  You may also receive medicine to manage health conditions that may occur with CKD  Dialysis is a treatment to remove chemicals and waste from your blood when your kidneys can no longer do this  Surgery may be needed to create an arteriovenous fistula (AVF) in your arm or insert a catheter into your abdomen so that you can receive dialysis  A kidney transplant may be done if your CKD becomes severe  Manage CKD:   · Maintain a healthy weight  Ask your healthcare provider how much you should weigh  Ask him to help you create a weight loss plan if you are overweight  · Exercise 30 to 60 minutes a day, 4 to 7 times a week, or as directed  Ask about the best exercise plan for you   Regular exercise can help you manage CKD, high blood pressure, and diabetes  · Follow your healthcare provider's advice about what to eat and drink  He may tell you to eat food low in sodium (salt), potassium, phosphorus, or protein  You may need to see a dietitian if you need help planning meals  Ask how much liquid to drink each day and which liquids are best for you  · Limit alcohol  Ask how much alcohol is safe for you to drink  A drink of alcohol is 12 ounces of beer, 5 ounces of wine, or 1½ ounces of liquor  · Do not smoke  Nicotine and other chemicals in cigarettes and cigars can cause lung and kidney damage  Ask your healthcare provider for information if you currently smoke and need help to quit  E-cigarettes or smokeless tobacco still contain nicotine  Talk to your healthcare provider before you use these products  · Ask your healthcare provider if you need vaccines  Infections such as pneumonia, influenza, and hepatitis can be more harmful or more likely to occur in a person who has CKD  Vaccines reduce your risk of infection with these viruses  Follow up with your healthcare provider as directed:  Write down your questions so you remember to ask them during your visits  © 2017 [employer] [employer] Information is for End User's use only and may not be sold, redistributed or otherwise used for commercial purposes  All illustrations and images included in CareNotes® are the copyrighted property of [employer]  or [employer]  The above information is an  only  It is not intended as medical advice for individual conditions or treatments  Talk to your doctor, nurse or pharmacist before following any medical regimen to see if it is safe and effective for you

## 2020-02-04 NOTE — ASSESSMENT & PLAN NOTE
Lab Results   Component Value Date    HGBA1C 7 9 07/02/2018    HGBA1C 7 9 (A) 07/02/2018    Hemoglobin A1c about 7 2    Importance of diabetic control and kidney disease discussed with him

## 2020-02-04 NOTE — ASSESSMENT & PLAN NOTE
PTH and phosphorus are within acceptable range will continue to monitor as part of the CKD management

## 2020-02-04 NOTE — PROGRESS NOTES
NEPHROLOGY OFFICE FOLLOW UP  Marbella Correia 68 y o  male MRN: [id number]    Encounter: [id number] 2/4/2020    REASON FOR VISIT: Marbella Correia is a 68 y o  male who is here on 2/4/2020 for Chronic Kidney Disease; Hypertension; and Follow-up    HPI:    Tate Lopez came in today for follow-up of stage III CKD  He is overall doing well  Denies any complaint    No chest pain no palpitation or shortness of breath      REVIEW OF SYSTEMS:    Review of Systems   Constitutional: Negative for activity change and fatigue  HENT: Negative for congestion and ear discharge  Eyes: Negative for photophobia and pain  Respiratory: Negative for apnea and choking  Cardiovascular: Negative for chest pain and palpitations  Gastrointestinal: Negative for abdominal distention and blood in stool  Endocrine: Negative for heat intolerance and polyphagia  Genitourinary: Negative for flank pain and urgency  Musculoskeletal: Negative for neck pain and neck stiffness  Skin: Negative for color change and wound  Allergic/Immunologic: Negative for food allergies and immunocompromised state  Neurological: Negative for seizures and facial asymmetry  Hematological: Negative for adenopathy  Does not bruise/bleed easily  Psychiatric/Behavioral: Negative for self-injury and suicidal ideas           PAST MEDICAL HISTORY:  Past Medical History:   Diagnosis Date    Chronic kidney disease     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)     Diabetes mellitus (HCC)     Hypertension     SSS (sick sinus syndrome) ([address] )        PAST SURGICAL HISTORY:  Past Surgical History:   Procedure Laterality Date    CARDIAC PACEMAKER PLACEMENT      CATARACT EXTRACTION      COLONOSCOPY      CYSTOSTOMY      REPLACEMENT TOTAL KNEE         SOCIAL HISTORY:  Social History     Substance and Sexual Activity   Alcohol Use No     Social History     Substance and Sexual Activity   Drug Use No     Social History     Tobacco Use   Smoking Status Former Smoker   Smokeless Tobacco Never Used       FAMILY HISTORY:  Family History   Problem Relation Age of Onset    No Known Problems Mother     No Known Problems Father        MEDICATIONS:    Current Outpatient Medications:     acetaminophen (TYLENOL) 500 mg tablet, Take by mouth, Disp: , Rfl:     amLODIPine (NORVASC) 5 mg tablet, , Disp: , Rfl:     metFORMIN (GLUCOPHAGE-XR) 500 mg 24 hr tablet, Take 1,500 mg by mouth daily with breakfast  , Disp: , Rfl:     Multiple Vitamins-Minerals (MULTIVITAMIN ADULT PO), Take 1 tablet by mouth daily, Disp: , Rfl:     saxagliptin (ONGLYZA) 5 MG tablet, Take 5 mg by mouth daily, Disp: , Rfl:     simvastatin (ZOCOR) 20 mg tablet, Take 1 tablet by mouth daily, Disp: , Rfl:     warfarin (COUMADIN) 7 5 mg tablet, Take 7 5 mg by mouth daily, Disp: , Rfl:     PHYSICAL EXAM:  Vitals:    02/04/20 0859   BP: 140/80   BP Location: Right arm   Patient Position: Sitting   Pulse: 68   Resp: 16   Temp: (!) 96 4 °F (35 8 °C)   TempSrc: Tympanic   Weight: 91 8 kg (202 lb 6 4 oz)   Height: 6' (1 829 m)     Body mass index is 27 45 kg/m²  Physical Exam   Constitutional: He is oriented to person, place, and time  He appears well-developed  No distress  HENT:   Head: Normocephalic  Mouth/Throat: Oropharynx is clear and moist    Eyes: Pupils are equal, round, and reactive to light  Conjunctivae are normal  No scleral icterus  Neck: Normal range of motion  Neck supple  No JVD present  Cardiovascular: Normal rate, regular rhythm and normal heart sounds  Pulmonary/Chest: Effort normal and breath sounds normal  He has no wheezes  Abdominal: Soft  Bowel sounds are normal  There is no tenderness  Musculoskeletal: Normal range of motion  He exhibits no edema  Neurological: He is alert and oriented to person, place, and time  Skin: Skin is warm  No rash noted  Psychiatric: He has a normal mood and affect   His behavior is normal        LAB RESULTS:  Results for orders placed or performed in visit on 09/01/13   Basic metabolic panel   Result Value Ref Range    SODIUM 140     POTASSIUM 4 0     CHLORIDE 108     CO2 29     BUN 12     CREATININE 1 22     Glucose 144     EXTERNAL CALCIUM 9 1     ANION GAP 3     EXTERNAL EGFR 56    Protein / creatinine ratio, urine   Result Value Ref Range    PROTEIN UA 19 8     EXT Creatinine Urine 85 5     EXTERNAL Ur Prot/Creat Ratio 0 23    Phosphorus   Result Value Ref Range    EXTERNAL PHOSPHORUS 2 6    Urinalysis Macroscopic (Historical)   Result Value Ref Range    Specific Gravity, Urine 1 013     PH, 24 HR URINE 6     Protein, UA Negative Negative mg/dl    GLUCOSE RANDOM      Ketones, UA Negative Negative mg/dl    Blood, UA Negative     WBC's      Nitrite, UA Negative Negative    RBC, UA      WBC 0 00 Thousand/uL    MUCUS THREADS     CBC and differential   Result Value Ref Range    WBC 5 4     External RBC 4 99     External Hemoglobin 13 3 g/dL    Hematocrit 41 %    MCV 82     External RDW 14 7     External Platelets 143 K/µL    Abs Neutrophils 3 3     Abs Lymphocytes 1 6     External Abs Monocytes  0 4     External Abs Eosinophils 0 1     External Abs Basophils  0 0    PTH, intact   Result Value Ref Range    PTH 42 6        ASSESSMENT and PLAN:      Stage 3 chronic kidney disease  Renal function is stable at creatinine 1 22 with GFR of 55  Advised to continue what is doing with good hydration and avoidance of any nephrotoxic medicine    Chronic kidney disease-mineral and bone disorder  PTH and phosphorus are within acceptable range will continue to monitor as part of the CKD management    SSS (sick sinus syndrome) ([facility] [address] )  Patient does have pacemaker as part of the management and  rate is well control    Type 2 diabetes mellitus without complication (HCC)    Lab Results   Component Value Date    HGBA1C 7 9 07/02/2018    HGBA1C 7 9 (A) 07/02/2018    Hemoglobin A1c about 7 2    Importance of diabetic control and kidney disease discussed with him    Chronic atrial fibrillation  Rate is very well control he is being closely monitored by cardiologist   He is on also a anticoagulation for in form of warfarin    Essential hypertension  Blood pressure is very well controlled with present medication    Everything discussed at length with him and his wife  I will see him back in 1 year again  Will get blood and urine test before that visit          Portions of the record may have been created with voice recognition software  Occasional wrong word or "sound a like" substitutions may have occurred due to the inherent limitations of voice recognition software  Read the chart carefully and recognize, using context, where substitutions have occurred  If you have any questions, please contact the dictating provider

## 2020-02-04 NOTE — ASSESSMENT & PLAN NOTE
Renal function is stable at creatinine 1 22 with GFR of 55    Advised to continue what is doing with good hydration and avoidance of any nephrotoxic medicine

## 2020-02-13 ENCOUNTER — OFFICE VISIT (OUTPATIENT)
Dept: INTERNAL MEDICINE CLINIC | Facility: CLINIC | Age: 80
End: 2020-02-13
Payer: MEDICARE

## 2020-02-13 VITALS
DIASTOLIC BLOOD PRESSURE: 82 MMHG | HEIGHT: 71 IN | SYSTOLIC BLOOD PRESSURE: 140 MMHG | HEART RATE: 80 BPM | OXYGEN SATURATION: 99 % | BODY MASS INDEX: 27.5 KG/M2 | WEIGHT: 196.4 LBS

## 2020-02-13 DIAGNOSIS — E11.9 TYPE 2 DIABETES MELLITUS WITHOUT COMPLICATION, WITHOUT LONG-TERM CURRENT USE OF INSULIN (HCC): Primary | ICD-10-CM

## 2020-02-13 DIAGNOSIS — I49.5 SSS (SICK SINUS SYNDROME) (HCC): ICD-10-CM

## 2020-02-13 DIAGNOSIS — I48.20 CHRONIC ATRIAL FIBRILLATION (HCC): ICD-10-CM

## 2020-02-13 DIAGNOSIS — K63.5 POLYP OF COLON, UNSPECIFIED PART OF COLON, UNSPECIFIED TYPE: ICD-10-CM

## 2020-02-13 DIAGNOSIS — I10 ESSENTIAL HYPERTENSION: ICD-10-CM

## 2020-02-13 DIAGNOSIS — N18.2 STAGE 2 CHRONIC KIDNEY DISEASE: ICD-10-CM

## 2020-02-13 DIAGNOSIS — K92.1 HEMATOCHEZIA: ICD-10-CM

## 2020-02-13 PROCEDURE — 3008F BODY MASS INDEX DOCD: CPT | Performed by: INTERNAL MEDICINE

## 2020-02-13 PROCEDURE — 3079F DIAST BP 80-89 MM HG: CPT | Performed by: INTERNAL MEDICINE

## 2020-02-13 PROCEDURE — 3066F NEPHROPATHY DOC TX: CPT | Performed by: INTERNAL MEDICINE

## 2020-02-13 PROCEDURE — 4040F PNEUMOC VAC/ADMIN/RCVD: CPT | Performed by: INTERNAL MEDICINE

## 2020-02-13 PROCEDURE — 1036F TOBACCO NON-USER: CPT | Performed by: INTERNAL MEDICINE

## 2020-02-13 PROCEDURE — 3077F SYST BP >= 140 MM HG: CPT | Performed by: INTERNAL MEDICINE

## 2020-02-13 PROCEDURE — 99213 OFFICE O/P EST LOW 20 MIN: CPT | Performed by: INTERNAL MEDICINE

## 2020-02-13 PROCEDURE — 1160F RVW MEDS BY RX/DR IN RCRD: CPT | Performed by: INTERNAL MEDICINE

## 2020-02-13 RX ORDER — OXYBUTYNIN CHLORIDE 10 MG/1
10 TABLET, EXTENDED RELEASE ORAL
COMMUNITY

## 2020-02-13 NOTE — PROGRESS NOTES
Diabetic Foot Exam    Right Foot/Ankle   Right Foot Inspection  Skin Exam: skin intact                            Sensory       Monofilament testing: intact  Vascular    The right DP pulse is 0  The right PT pulse is 1+  Left Foot/Ankle  Left Foot Inspection  Skin Exam: skin intact                                         Sensory       Monofilament: intact  Vascular    The left DP pulse is 0  The left PT pulse is 1+  Assign Risk Category:  No deformity present;  No loss of protective sensation; Weak pulses       Risk: 1

## 2020-02-13 NOTE — PROGRESS NOTES
BMI Counseling: Body mass index is 27 39 kg/m²  The BMI is above normal  Nutrition recommendations include moderation in carbohydrate intake  Exercise recommendations include moderate physical activity 150 minutes/week

## 2020-02-13 NOTE — PATIENT INSTRUCTIONS
Patient with chronic diagnoses who nevertheless looks well  He will need referral to gastroenterology for colon screening due to history of colon polyp but also for assessment of hematochezia  I am asking the patient to find out once more from his insurance carrier and if Pradaxa or Xarelto are covered  Continue same meds    Revisit with me should be in 6 months  Call if problems develop

## 2020-02-13 NOTE — PROGRESS NOTES
Assessment/Plan:       Diagnoses and all orders for this visit:    Type 2 diabetes mellitus without complication, without long-term current use of insulin (HCC)    Chronic atrial fibrillation    Essential hypertension    SSS (sick sinus syndrome) ([facility])    Stage 2 chronic kidney disease    Polyp of colon, unspecified part of colon, unspecified type  -     Ambulatory referral to Gastroenterology; Future    Hematochezia  -     Ambulatory referral to Gastroenterology; Future    Other orders  -     oxybutynin (DITROPAN-XL) 10 MG 24 hr tablet; Take 10 mg by mouth daily at bedtime                Subjective:      Patient ID: Ketan Jackson is a 68 y o  male  New patient visit of a 81-year-old man who looks 21 years younger than stated age   He is diabetic, hypertensive, and dyslipidemic  He has chronic kidney disease stage 2 and follows with nephrology  Most recent hemoglobin A1c done 2019 was 7 1%  No cigarettes, no alcohol, no illicit drugs    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  History of sick sinus syndrome  He has a pacemaker  This was placed in 2016  A complication of care was 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  He fibrillating so is on anticoagulation anyway  Right now on Coumadin because he cannot afford Eliquis  Globally feels well  Mom  from stroke, father  from heart related illness  Both a relatively young ages  The patient had a colonoscopy in 2015 and apparently it is due this year; had a polyp  The patient had bilateral cataract surgery  He had kidney stones  He has diverticulosis  A recent E[facility] done at [facility] shows 100% pacing        The following portions of the patient's history were reviewed and updated as appropriate:   He has a past medical history of Chronic kidney disease, 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 [address] ), Diabetes mellitus ([facility] [address] ), Diverticulosis, History of cardiac cath, Hypertension, Kidney stone, and SSS (sick sinus syndrome) ([facility] [address] )  ,  does not have any pertinent problems on file  ,   has a past surgical history that includes Replacement total knee; Cataract extraction; Colonoscopy; Cystostomy; EGD AND COLONOSCOPY; Anal manometry (10/06/2015); and Cardiac pacemaker placement  ,  family history includes No Known Problems in his father and mother  ,   reports that he has quit smoking  He has never used smokeless tobacco  He reports that he does not drink alcohol or use drugs  ,  has No Known Allergies     Current Outpatient Medications   Medication Sig Dispense Refill    acetaminophen (TYLENOL) 500 mg tablet Take by mouth      amLODIPine (NORVASC) 5 mg tablet       metFORMIN (GLUCOPHAGE-XR) 500 mg 24 hr tablet Take 1,500 mg by mouth daily with breakfast        Multiple Vitamins-Minerals (MULTIVITAMIN ADULT PO) Take 1 tablet by mouth daily      oxybutynin (DITROPAN-XL) 10 MG 24 hr tablet Take 10 mg by mouth daily at bedtime      saxagliptin (ONGLYZA) 5 MG tablet Take 5 mg by mouth daily      simvastatin (ZOCOR) 20 mg tablet Take 1 tablet by mouth daily      warfarin (COUMADIN) 7 5 mg tablet Take 7 5 mg by mouth daily       No current facility-administered medications for this visit  Review of Systems   Constitutional: Negative for chills and fever  HENT: Negative for sore throat and trouble swallowing  Eyes: Negative for pain  Respiratory: Negative for cough and shortness of breath  Cardiovascular: Negative for chest pain and palpitations  Gastrointestinal: Positive for anal bleeding  Negative for abdominal pain, blood in stool, diarrhea, nausea and vomiting  Endocrine: Negative for cold intolerance and heat intolerance  Genitourinary: Negative for dysuria, frequency and testicular pain  Musculoskeletal: Negative for joint swelling  Skin: Negative for rash  Allergic/Immunologic: Negative for immunocompromised state     Neurological: Negative for dizziness, syncope and headaches  Hematological: Negative for adenopathy  Does not bruise/bleed easily  Psychiatric/Behavioral: Negative for dysphoric mood  The patient is not nervous/anxious  Objective:  Vitals:    02/13/20 0814   BP: 140/82   Pulse: 80   SpO2: 99%      Physical Exam   Constitutional: He is oriented to person, place, and time  He appears well-developed and well-nourished  HENT:   Head: Normocephalic and atraumatic  Eyes: Pupils are equal, round, and reactive to light  Neck: Normal range of motion  Neck supple  No tracheal deviation present  No thyromegaly present  Cardiovascular: Normal rate, regular rhythm and normal heart sounds  Exam reveals no gallop  No murmur heard  Pulmonary/Chest: Effort normal  No respiratory distress  He has no wheezes  He has no rales  Abdominal: Soft  Bowel sounds are normal  There is no tenderness  Musculoskeletal: Normal range of motion  He exhibits no tenderness or deformity  Neurological: He is alert and oriented to person, place, and time  He has normal reflexes  Coordination normal    Skin: Skin is warm and dry  Psychiatric: He has a normal mood and affect  Patient Instructions   Patient with chronic diagnoses who nevertheless looks well  He will need referral to gastroenterology for colon screening due to history of colon polyp but also for assessment of hematochezia  I am asking the patient to find out once more from his insurance carrier and if Pradaxa or Xarelto are covered  Continue same meds    Revisit with me should be in 6 months  Call if problems develop

## 2020-03-05 ENCOUNTER — OFFICE VISIT (OUTPATIENT)
Dept: GASTROENTEROLOGY | Facility: CLINIC | Age: 80
End: 2020-03-05
Payer: MEDICARE

## 2020-03-05 VITALS
BODY MASS INDEX: 27.69 KG/M2 | DIASTOLIC BLOOD PRESSURE: 74 MMHG | WEIGHT: 198.5 LBS | HEART RATE: 57 BPM | SYSTOLIC BLOOD PRESSURE: 132 MMHG

## 2020-03-05 DIAGNOSIS — K92.1 HEMATOCHEZIA: Primary | ICD-10-CM

## 2020-03-05 PROCEDURE — 99204 OFFICE O/P NEW MOD 45 MIN: CPT | Performed by: INTERNAL MEDICINE

## 2020-03-05 NOTE — PROGRESS NOTES
Consultation - [facility]  Bertha Chamorro 1940 68 y o  male     ASSESSMENT @ PLAN:   He is a 24-year-old male with intermittent rectal bleeding that will likely be from internal hemorrhoids  He does have a history of colon polyps and his last colonoscopy was 5 years ago  1 will do colonoscopy to investigate    2 will give hydrocortisone course per anal    3 I told him to do a sugar free fiber supplement    Chief Complaint:   Rectal bleeding    HPI:   He is a 24-year-old man with rectal bleeding intermittently  He is anticoagulated on warfarin for atrial fibrillation  He thinks the at it is similar to prior episodes of internal hemorrhoid bleeding  Is mostly on the toilet paper and on the outside of the stool  He denies abdominal pain diarrhea constipation  He has normal formed bowel movements about twice a day  His weight is stable  He does have a history of colon polyps  His last colonoscopy was 5 years ago where he had left-sided diverticulosis with me  He also had endoscopy at that time to that showed a small hiatal hernia otherwise normal   He has no nausea vomiting his weight is stable  Nobody in the family had colon cancer ulcerative colitis or Crohn's disease  He is here with his wife who is also my patient  REVIEW OF SYSTEMS:     CONSTITUTIONAL: Denies any fever, chills, or rigors  Good appetite, and no recent weight loss  HEENT: No earache or tinnitus  Denies hearing loss or visual disturbances  CARDIOVASCULAR: No chest pain or palpitations  RESPIRATORY: Denies any cough, hemoptysis, shortness of breath or dyspnea on exertion  GASTROINTESTINAL: As noted in the History of Present Illness  GENITOURINARY: No problems with urination  Denies any hematuria or dysuria  NEUROLOGIC: No dizziness or vertigo, denies headaches  MUSCULOSKELETAL: Denies any muscle or joint pain  SKIN: Denies skin rashes or itching     ENDOCRINE: Denies excessive thirst  Denies intolerance to heat or cold  PSYCHOSOCIAL: Denies depression or anxiety  Denies any recent memory loss       Past Medical History:   Diagnosis Date    Chronic kidney disease     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)     Diabetes mellitus ([address] )     Diverticulosis     History of cardiac cath     Hypertension     Kidney stone     SSS [facility]sick sinus syndrome) ([facility])       Past Surgical History:   Procedure Laterality Date    ANAL MANOMETRY  10/06/2015    CARDIAC PACEMAKER PLACEMENT      CATARACT EXTRACTION      COLONOSCOPY      CYSTOSTOMY      EGD AND COLONOSCOPY      REPLACEMENT TOTAL KNEE       Social History     Socioeconomic History    Marital status: /Civil Union     Spouse name: Not on file    Number of children: Not on file    Years of education: Not on file    Highest education level: Not on file   Occupational History    Not on file   Social Needs    Financial resource strain: Not on file    Food insecurity:     Worry: Not on file     Inability: Not on file    Transportation needs:     Medical: Not on file     Non-medical: Not on file   Tobacco Use    Smoking status: Former Smoker    Smokeless tobacco: Never Used   Substance and Sexual Activity    Alcohol use: No    Drug use: No    Sexual activity: Not on file   Lifestyle    Physical activity:     Days per week: 0 days     Minutes per session: 0 min    Stress: Not on file   Relationships    Social connections:     Talks on phone: Not on file     Gets together: Not on file     Attends Yazidism service: Not on file     Active member of club or organization: Not on file     Attends meetings of clubs or organizations: Not on file     Relationship status: Not on file    Intimate partner violence:     Fear of current or ex partner: Not on file     Emotionally abused: Not on file     Physically abused: Not on file     Forced sexual activity: Not on file   Other Topics Concern    Not on file   Social History Narrative  Not on file     Family History   Problem Relation Age of Onset    No Known Problems Mother     No Known Problems Father      Patient has no known allergies  Current Outpatient Medications   Medication Sig Dispense Refill    acetaminophen (TYLENOL) 500 mg tablet Take by mouth      amLODIPine (NORVASC) 5 mg tablet       metFORMIN (GLUCOPHAGE-XR) 500 mg 24 hr tablet Take 1,500 mg by mouth daily with breakfast        Multiple Vitamins-Minerals (MULTIVITAMIN ADULT PO) Take 1 tablet by mouth daily      oxybutynin (DITROPAN-XL) 10 MG 24 hr tablet Take 10 mg by mouth daily at bedtime      saxagliptin (ONGLYZA) 5 MG tablet Take 5 mg by mouth daily      simvastatin (ZOCOR) 20 mg tablet Take 1 tablet by mouth daily      warfarin (COUMADIN) 7 5 mg tablet Take 7 5 mg by mouth daily      hydrocortisone (ANUSOL-HC, PROCTOSOL HC,) 2 5 % rectal cream Insert into the rectum 2 (two) times a day 30 g 0     No current facility-administered medications for this visit  Blood pressure 132/74, pulse 57, weight 90 kg (198 lb 8 oz)  PHYSICAL EXAM:     General Appearance:   Alert, cooperative, no distress, appears stated age    HEENT:   Normocephalic, atraumatic, anicteric      Neck:  Supple, symmetrical, trachea midline, no adenopathy;    thyroid: no enlargement/tenderness/nodules; no carotid  bruit or JVD    Lungs:   Clear to auscultation bilaterally; no rales, rhonchi or wheezing; respirations unlabored    Heart[de-identified]   S1 and S2 normal; regular rate and rhythm; no murmur, rub, or gallop     Abdomen:   Soft, non-tender, non-distended; normal bowel sounds; no masses, no organomegaly    Genitalia:   Deferred    Rectal:   Deferred    Extremities:  No cyanosis, clubbing or edema    Pulses:  2+ and symmetric all extremities    Skin:  Skin color, texture, turgor normal, no rashes or lesions    Lymph nodes:  No palpable cervical, axillary or inguinal lymphadenopathy        Lab Results   Component Value Date    WBC 0 00 01/27/2020    WBC 5 4 01/27/2020    HGB 13 3 01/27/2020    HCT 41 01/27/2020    MCV 82 01/27/2020     01/27/2020     Lab Results   Component Value Date    CALCIUM 9 1 01/27/2020    K 4 0 01/27/2020    CO2 29 01/27/2020     01/27/2020    BUN 12 01/27/2020    CREATININE 1 22 01/27/2020     Lab Results   Component Value Date    ALT 13 07/02/2018    AST 12 07/02/2018    ALKPHOS 75 07/02/2018     No results found for: INR, PROTIME

## 2020-03-05 NOTE — H&P (VIEW-ONLY)
Consultation - [facility]  Facundo Morris 1940 68 y o  male     ASSESSMENT @ PLAN:   He is a 79-year-old male with intermittent rectal bleeding that will likely be from internal hemorrhoids  He does have a history of colon polyps and his last colonoscopy was 5 years ago  1 will do colonoscopy to investigate    2 will give hydrocortisone course per anal    3 I told him to do a sugar free fiber supplement    Chief Complaint:   Rectal bleeding    HPI:   He is a 79-year-old man with rectal bleeding intermittently  He is anticoagulated on warfarin for atrial fibrillation  He thinks the at it is similar to prior episodes of internal hemorrhoid bleeding  Is mostly on the toilet paper and on the outside of the stool  He denies abdominal pain diarrhea constipation  He has normal formed bowel movements about twice a day  His weight is stable  He does have a history of colon polyps  His last colonoscopy was 5 years ago where he had left-sided diverticulosis with me  He also had endoscopy at that time to that showed a small hiatal hernia otherwise normal   He has no nausea vomiting his weight is stable  Nobody in the family had colon cancer ulcerative colitis or Crohn's disease  He is here with his wife who is also my patient  REVIEW OF SYSTEMS:     CONSTITUTIONAL: Denies any fever, chills, or rigors  Good appetite, and no recent weight loss  HEENT: No earache or tinnitus  Denies hearing loss or visual disturbances  CARDIOVASCULAR: No chest pain or palpitations  RESPIRATORY: Denies any cough, hemoptysis, shortness of breath or dyspnea on exertion  GASTROINTESTINAL: As noted in the History of Present Illness  GENITOURINARY: No problems with urination  Denies any hematuria or dysuria  NEUROLOGIC: No dizziness or vertigo, denies headaches  MUSCULOSKELETAL: Denies any muscle or joint pain  SKIN: Denies skin rashes or itching     ENDOCRINE: Denies excessive thirst  Denies intolerance to heat or cold  PSYCHOSOCIAL: Denies depression or anxiety  Denies any recent memory loss       Past Medical History:   Diagnosis Date    Chronic kidney disease     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)     Diabetes mellitus ([address] )     Diverticulosis     History of cardiac cath     Hypertension     Kidney stone     SSS [facility]sick sinus syndrome) ([facility])       Past Surgical History:   Procedure Laterality Date    ANAL MANOMETRY  10/06/2015    CARDIAC PACEMAKER PLACEMENT      CATARACT EXTRACTION      COLONOSCOPY      CYSTOSTOMY      EGD AND COLONOSCOPY      REPLACEMENT TOTAL KNEE       Social History     Socioeconomic History    Marital status: /Civil Union     Spouse name: Not on file    Number of children: Not on file    Years of education: Not on file    Highest education level: Not on file   Occupational History    Not on file   Social Needs    Financial resource strain: Not on file    Food insecurity:     Worry: Not on file     Inability: Not on file    Transportation needs:     Medical: Not on file     Non-medical: Not on file   Tobacco Use    Smoking status: Former Smoker    Smokeless tobacco: Never Used   Substance and Sexual Activity    Alcohol use: No    Drug use: No    Sexual activity: Not on file   Lifestyle    Physical activity:     Days per week: 0 days     Minutes per session: 0 min    Stress: Not on file   Relationships    Social connections:     Talks on phone: Not on file     Gets together: Not on file     Attends Holiness service: Not on file     Active member of club or organization: Not on file     Attends meetings of clubs or organizations: Not on file     Relationship status: Not on file    Intimate partner violence:     Fear of current or ex partner: Not on file     Emotionally abused: Not on file     Physically abused: Not on file     Forced sexual activity: Not on file   Other Topics Concern    Not on file   Social History Narrative  Not on file     Family History   Problem Relation Age of Onset    No Known Problems Mother     No Known Problems Father      Patient has no known allergies  Current Outpatient Medications   Medication Sig Dispense Refill    acetaminophen (TYLENOL) 500 mg tablet Take by mouth      amLODIPine (NORVASC) 5 mg tablet       metFORMIN (GLUCOPHAGE-XR) 500 mg 24 hr tablet Take 1,500 mg by mouth daily with breakfast        Multiple Vitamins-Minerals (MULTIVITAMIN ADULT PO) Take 1 tablet by mouth daily      oxybutynin (DITROPAN-XL) 10 MG 24 hr tablet Take 10 mg by mouth daily at bedtime      saxagliptin (ONGLYZA) 5 MG tablet Take 5 mg by mouth daily      simvastatin (ZOCOR) 20 mg tablet Take 1 tablet by mouth daily      warfarin (COUMADIN) 7 5 mg tablet Take 7 5 mg by mouth daily      hydrocortisone (ANUSOL-HC, PROCTOSOL HC,) 2 5 % rectal cream Insert into the rectum 2 (two) times a day 30 g 0     No current facility-administered medications for this visit  Blood pressure 132/74, pulse 57, weight 90 kg (198 lb 8 oz)  PHYSICAL EXAM:     General Appearance:   Alert, cooperative, no distress, appears stated age    HEENT:   Normocephalic, atraumatic, anicteric      Neck:  Supple, symmetrical, trachea midline, no adenopathy;    thyroid: no enlargement/tenderness/nodules; no carotid  bruit or JVD    Lungs:   Clear to auscultation bilaterally; no rales, rhonchi or wheezing; respirations unlabored    Heart[de-identified]   S1 and S2 normal; regular rate and rhythm; no murmur, rub, or gallop     Abdomen:   Soft, non-tender, non-distended; normal bowel sounds; no masses, no organomegaly    Genitalia:   Deferred    Rectal:   Deferred    Extremities:  No cyanosis, clubbing or edema    Pulses:  2+ and symmetric all extremities    Skin:  Skin color, texture, turgor normal, no rashes or lesions    Lymph nodes:  No palpable cervical, axillary or inguinal lymphadenopathy        Lab Results   Component Value Date    WBC 0 00 01/27/2020    WBC 5 4 01/27/2020    HGB 13 3 01/27/2020    HCT 41 01/27/2020    MCV 82 01/27/2020     01/27/2020     Lab Results   Component Value Date    CALCIUM 9 1 01/27/2020    K 4 0 01/27/2020    CO2 29 01/27/2020     01/27/2020    BUN 12 01/27/2020    CREATININE 1 22 01/27/2020     Lab Results   Component Value Date    ALT 13 07/02/2018    AST 12 07/02/2018    ALKPHOS 75 07/02/2018     No results found for: INR, PROTIME

## 2020-03-13 DIAGNOSIS — E11.9 TYPE 2 DIABETES MELLITUS WITHOUT COMPLICATION, WITHOUT LONG-TERM CURRENT USE OF INSULIN (HCC): Primary | ICD-10-CM

## 2020-03-13 RX ORDER — METFORMIN HYDROCHLORIDE 500 MG/1
1500 TABLET, EXTENDED RELEASE ORAL 3 TIMES DAILY
Qty: 810 TABLET | Refills: 1 | Status: CANCELLED | OUTPATIENT
Start: 2020-03-13

## 2020-03-13 RX ORDER — METFORMIN HYDROCHLORIDE 500 MG/1
1500 TABLET, EXTENDED RELEASE ORAL
Qty: 270 TABLET | Refills: 3 | Status: SHIPPED | OUTPATIENT
Start: 2020-03-13 | End: 2022-04-16 | Stop reason: ALTCHOICE

## 2020-03-24 ENCOUNTER — TELEPHONE (OUTPATIENT)
Dept: GASTROENTEROLOGY | Facility: CLINIC | Age: 80
End: 2020-03-24

## 2020-03-25 ENCOUNTER — TELEPHONE (OUTPATIENT)
Dept: GASTROENTEROLOGY | Facility: CLINIC | Age: 80
End: 2020-03-25

## 2020-03-25 NOTE — TELEPHONE ENCOUNTER
Spoke with wife advised that you would be calling later to discuss when to do the colonoscopy, patient is on coumadin for clots in upper arm after pacemaker was placed in 2016

## 2020-03-25 NOTE — TELEPHONE ENCOUNTER
----- Message from Vania Villa MD sent at 3/25/2020  9:07 AM EDT -----  He is going to move his colonoscopy to Friday this week-please call him and confirm this and have me started at 11:00 a m  and have him go first

## 2020-03-27 ENCOUNTER — ANESTHESIA (OUTPATIENT)
Dept: PERIOP | Facility: HOSPITAL | Age: 80
End: 2020-03-27

## 2020-03-27 ENCOUNTER — ANESTHESIA EVENT (OUTPATIENT)
Dept: PERIOP | Facility: HOSPITAL | Age: 80
End: 2020-03-27

## 2020-03-27 ENCOUNTER — HOSPITAL ENCOUNTER (OUTPATIENT)
Dept: PERIOP | Facility: HOSPITAL | Age: 80
Setting detail: OUTPATIENT SURGERY
Discharge: HOME | End: 2020-03-27
Attending: INTERNAL MEDICINE
Payer: MEDICARE

## 2020-03-27 VITALS
DIASTOLIC BLOOD PRESSURE: 62 MMHG | WEIGHT: 194.45 LBS | OXYGEN SATURATION: 100 % | HEIGHT: 74 IN | HEART RATE: 60 BPM | SYSTOLIC BLOOD PRESSURE: 122 MMHG | RESPIRATION RATE: 20 BRPM | TEMPERATURE: 97.5 F | BODY MASS INDEX: 24.95 KG/M2

## 2020-03-27 DIAGNOSIS — K92.1 HEMATOCHEZIA: ICD-10-CM

## 2020-03-27 LAB — GLUCOSE SERPL-MCNC: 133 MG/DL (ref 65–140)

## 2020-03-27 PROCEDURE — 88305 TISSUE EXAM BY PATHOLOGIST: CPT | Performed by: PATHOLOGY

## 2020-03-27 PROCEDURE — 45385 COLONOSCOPY W/LESION REMOVAL: CPT

## 2020-03-27 PROCEDURE — 82948 REAGENT STRIP/BLOOD GLUCOSE: CPT

## 2020-03-27 RX ORDER — LIDOCAINE HYDROCHLORIDE 20 MG/ML
INJECTION, SOLUTION EPIDURAL; INFILTRATION; INTRACAUDAL; PERINEURAL AS NEEDED
Status: DISCONTINUED | OUTPATIENT
Start: 2020-03-27 | End: 2020-03-27 | Stop reason: SURG

## 2020-03-27 RX ORDER — LIDOCAINE HYDROCHLORIDE 10 MG/ML
0.5 INJECTION, SOLUTION EPIDURAL; INFILTRATION; INTRACAUDAL; PERINEURAL ONCE AS NEEDED
Status: DISCONTINUED | OUTPATIENT
Start: 2020-03-27 | End: 2020-03-31 | Stop reason: HOSPADM

## 2020-03-27 RX ORDER — SODIUM CHLORIDE, SODIUM LACTATE, POTASSIUM CHLORIDE, CALCIUM CHLORIDE 600; 310; 30; 20 MG/100ML; MG/100ML; MG/100ML; MG/100ML
125 INJECTION, SOLUTION INTRAVENOUS CONTINUOUS
Status: DISCONTINUED | OUTPATIENT
Start: 2020-03-27 | End: 2020-03-31 | Stop reason: HOSPADM

## 2020-03-27 RX ORDER — PROPOFOL 10 MG/ML
INJECTION, EMULSION INTRAVENOUS AS NEEDED
Status: DISCONTINUED | OUTPATIENT
Start: 2020-03-27 | End: 2020-03-27 | Stop reason: SURG

## 2020-03-27 RX ADMIN — SODIUM CHLORIDE, SODIUM LACTATE, POTASSIUM CHLORIDE, AND CALCIUM CHLORIDE 125 ML/HR: .6; .31; .03; .02 INJECTION, SOLUTION INTRAVENOUS at 11:10

## 2020-03-27 RX ADMIN — PROPOFOL 50 MG: 10 INJECTION, EMULSION INTRAVENOUS at 11:58

## 2020-03-27 RX ADMIN — LIDOCAINE HYDROCHLORIDE 50 MG: 20 INJECTION, SOLUTION EPIDURAL; INFILTRATION; INTRACAUDAL; PERINEURAL at 11:52

## 2020-03-27 RX ADMIN — PROPOFOL 50 MG: 10 INJECTION, EMULSION INTRAVENOUS at 11:55

## 2020-03-27 RX ADMIN — PROPOFOL 100 MG: 10 INJECTION, EMULSION INTRAVENOUS at 11:52

## 2020-03-27 NOTE — DISCHARGE INSTRUCTIONS
Diverticulosis   WHAT YOU NEED TO KNOW:   Diverticulosis is a condition that causes small pockets called diverticula to form in your intestine  These pockets make it difficult for bowel movements to pass through your digestive system  DISCHARGE INSTRUCTIONS:   Seek care immediately if:   · You have severe pain on the left side of your lower abdomen  · Your bowel movements are bright or dark red  Contact your healthcare provider if:   · You have a fever and chills  · You feel dizzy or lightheaded  · You have nausea, or you are vomiting  · You have a change in your bowel movements  · You have questions or concerns about your condition or care  Medicines:   · Medicines  to soften your bowel movements may be given  You may also need medicines to treat symptoms such as bloating and pain  · Take your medicine as directed  Contact your healthcare provider if you think your medicine is not helping or if you have side effects  Tell him or her if you are allergic to any medicine  Keep a list of the medicines, vitamins, and herbs you take  Include the amounts, and when and why you take them  Bring the list or the pill bottles to follow-up visits  Carry your medicine list with you in case of an emergency  Self-care: The goal of treatment is to manage any symptoms you have and prevent other problems such as diverticulitis  Diverticulitis is swelling or infection of the diverticula  Your healthcare provider may recommend any of the following:  · Eat a variety of high-fiber foods  High-fiber foods help you have regular bowel movements  High-fiber foods include cooked beans, fruits, vegetables, and some cereals  Most adults need 25 to 35 grams of fiber each day  Your healthcare provider may recommend that you have more  Ask your healthcare provider how much fiber you need  Increase fiber slowly  You may have abdominal discomfort, bloating, and gas if you add fiber to your diet too quickly   You may need to take a fiber supplement if you are not getting enough fiber from food  · Drink liquids as directed  You may need to drink 2 to 3 liters (8 to 12 cups) of liquids every day  Ask your healthcare provider how much liquid to drink each day and which liquids are best for you  · Apply heat  on your abdomen for 20 to 30 minutes every 2 hours for as many days as directed  Heat helps decrease pain and muscle spasms  Help prevent diverticulitis or other symptoms: The following may help decrease your risk for diverticulitis or symptoms, such as bleeding  Talk to your provider about these or other things you can do to prevent problems that may occur with diverticulosis  · Exercise regularly  Ask your healthcare provider about the best exercise plan for you  Exercise can help you have regular bowel movements  Get 30 minutes of exercise on most days of the week  · Maintain a healthy weight  Ask your healthcare provider how much you should weigh  Ask him or her to help you create a weight loss plan if you are overweight  · Do not smoke  Nicotine and other chemicals in cigarettes increase your risk for diverticulitis  Ask your healthcare provider for information if you currently smoke and need help to quit  E-cigarettes or smokeless tobacco still contain nicotine  Talk to your healthcare provider before you use these products  · Ask your healthcare provider if it is safe to take NSAIDs  NSAIDs may increase your risk of diverticulitis  Follow up with your healthcare provider as directed:  Write down your questions so you remember to ask them during your visits  © 2017 [address] [employer] Information is for End User's use only and may not be sold, redistributed or otherwise used for commercial purposes  All illustrations and images included in CareNotes® are the copyrighted property of [employer]  or Yony Michelle  The above information is an  only  It is not intended as medical advice for individual conditions or treatments  Talk to your doctor, nurse or pharmacist before following any medical regimen to see if it is safe and effective for you  Colonoscopy   AMBULATORY CARE:   What you need to know about a colonoscopy:  A colonoscopy is a procedure to examine the inside of your colon (intestine) with a scope  A scope is a flexible tube with a small light and camera on the end  Polyps or tissue growths may be removed during your colonoscopy  What you need to do the week before your colonoscopy: You will need to stop taking medicines that contain aspirin or iron for 7 days before your colonoscopy  If you take anticoagulants, such as warfarin, ask when you should stop taking it  Make plans for someone to drive you home after your procedure  How to prepare for your colonoscopy: Your healthcare provider will have you prepare your bowels before your procedure  Your bowels will need to be empty before your procedure to allow him to clearly see your colon  You will need to do the following the day before your procedure:  · Have only clear liquids  for the entire day before your colonoscopy  Clear liquid diet includes clear fruit juices and broths, clear flavored gelatin, and hard candy  It also includes coffee, tea, carbonated beverages, and clear sports drinks  · Follow your bowel prep as directed  There are many different preparations that can be given before a colonoscopy  Some are given over 2 hours and others over 6 hours  Some are given earlier in the afternoon the day before the colonoscopy  Others are given the day before and then the morning of the colonoscopy  With any bowel prep, stay close to the bathroom  This liquid will cause your bowels to move frequently  · An enema  may be needed  Your healthcare provider may tell you to use an enema to help clean out your bowels  · Do not eat or drink anything after midnight    This will help prevent problems that can happen if you vomit while under anesthesia  What will happen during your colonoscopy:   · You will be given medicine to help you relax  You will lie on your left side and raise one or both knees toward your chest  Your healthcare provider will examine your anus and use a finger to check your rectum  You may need another enema if your bowel is not empty  The scope will be lubricated and gently placed into your anus  It will then be passed through your rectum and into your colon  Water or air will be put into your colon to help clean or expand it  This is done so your healthcare provider can see your colon clearly  · Tissue samples may be taken from the walls of your bowel and sent to a lab for tests  If you have a polyp, your healthcare provider will pass a wire loop through the scope and use it to hold the polyp  The polyp is then burned or cut off the wall of your colon  Removed polyps are sent to a lab for tests  Pictures of your colon may be taken during the procedure  The scope will be removed when the procedure is done  What will happen after your colonoscopy:   · Rest after your procedure  You may feel bloated, have some gas and abdominal discomfort  You may need to lie on your right side with a heating pad on your abdomen  You may need to take short walks to help move the gas out  Eat small meals, if you feel bloated  Do not drive or make important decisions until the day after your procedure  · You may have polyps removed  Do not take aspirin or go on long car trips for 7 days after your procedure  Ask your healthcare provider about any other limits after your procedure  Risks of a colonoscopy: You may have pain or bleeding after the scope or polyps are removed  You may also have a slow heartbeat, decreased blood pressure, or increased sweating  Your colon may tear due to the increased pressure from the scope and other instruments   This may cause bowel contents to leak out of your colon and into your abdomen  If this happens, you will need to stay in the hospital and have surgery on your colon  Seek care immediately if:   · You have a large amount of bright red blood in your bowel movements  · Your abdomen is hard and firm and you have severe pain  · You have sudden trouble breathing  Contact your healthcare provider if:   · You develop a rash or hives  · You have a fever within 24 hours of your procedure  · You have not had a bowel movement for 3 days after your procedure  · You have questions or concerns about your condition or care  Activity:   · Do not lift, strain, or run  for 3 days after your procedure  · Rest after your procedure  You have been given medicine to relax you  Do not  drive or make important decisions until the day after your procedure  Return to your normal activity as directed  · Relieve gas and discomfort from bloating  by lying on your right side with a heating pad on your abdomen  You may need to take short walks to help the gas move out  Eat small meals until bloating is relieved  If you had polyps removed: For 7 days after your procedure:  · Do not  take aspirin  · Do not  go on long car rides  Help prevent constipation:   · Eat a variety of healthy foods  Healthy foods include fruit, vegetables, whole-grain breads, low-fat dairy products, beans, lean meat, and fish  Ask if you need to be on a special diet  Your healthcare provider may recommend that you eat high-fiber foods such as cooked beans  Fiber helps you have regular bowel movements  · Drink liquids as directed  Adults should drink between 9 and 13 eight-ounce cups of liquid every day  Ask what amount is best for you  For most people, good liquids to drink are water, juice, and milk  · Exercise as directed  Talk to your healthcare provider about the best exercise plan for you   Exercise can help prevent constipation, decrease your blood pressure and improve your health  Follow up with your healthcare provider as directed:  Write down your questions so you remember to ask them during your visits  © 2017 [employer] Information is for End User's use only and may not be sold, redistributed or otherwise used for commercial purposes  All illustrations and images included in CareNotes® are the copyrighted property of [employer]  or Yony Michelle  The above information is an  only  It is not intended as medical advice for individual conditions or treatments  Talk to your doctor, nurse or pharmacist before following any medical regimen to see if it is safe and effective for you

## 2020-03-27 NOTE — ANESTHESIA POSTPROCEDURE EVALUATION
Post-Op Assessment Note    CV Status:  Stable    Pain management: adequate     Mental Status:  Arousable   Hydration Status:  Stable   PONV Controlled:  None   Airway Patency:  Patent   Post Op Vitals Reviewed: Yes      Staff: CRNA           BP   127/70   Temp      Pulse  66   Resp   16   SpO2  100

## 2020-03-27 NOTE — ANESTHESIA PREPROCEDURE EVALUATION
Review of Systems/Medical History  Patient summary reviewed    No history of anesthetic complications     Cardiovascular  Exercise tolerance (METS): >4,  Pacemaker/AICD, Hypertension , Dysrhythmias (SSS) , atrial fibrillation, No angina , DVT   Pulmonary  No shortness of breath, No recent URI ,        GI/Hepatic    No GERD ,        Kidney stones, Chronic kidney disease stage 2,        Endo/Other  Diabetes type 2 ,      GYN       Hematology    Coagulation disorder (warfarin, stopped 3/25) currently taking oral anticoagulants,    Musculoskeletal       Neurology  No seizures ,  No CVA ,    Psychology           Physical Exam    Airway    Mallampati score: II  TM Distance: >3 FB  Neck ROM: full     Dental   No notable dental hx     Cardiovascular      Pulmonary      Other Findings        Anesthesia Plan  ASA Score- 3     Anesthesia Type- IV sedation with anesthesia with ASA Monitors  Additional Monitors:   Airway Plan:         Plan Factors-    Induction- intravenous  Postoperative Plan-     Informed Consent- Anesthetic plan and risks discussed with patient  I personally reviewed this patient with the CRNA  Discussed and agreed on the Anesthesia Plan with the CRNA  Ameya Merrill

## 2020-03-27 NOTE — INTERVAL H&P NOTE
H&P reviewed  After examining the patient I find no changes in the patients condition since the H&P had been written      Vitals:    03/27/20 1057   BP: 136/79   Pulse: 64   Temp: 98 1 °F (36 7 °C)   SpO2: 99%

## 2020-03-31 ENCOUNTER — TELEPHONE (OUTPATIENT)
Dept: GASTROENTEROLOGY | Facility: CLINIC | Age: 80
End: 2020-03-31

## 2020-03-31 NOTE — TELEPHONE ENCOUNTER
----- Message from Charline Shea MD sent at 3/31/2020 11:52 AM EDT -----  Please tell him that his polyps were precancerous; no recall colonoscopy recommended

## 2020-03-31 NOTE — TELEPHONE ENCOUNTER
Spoke with Tomas Heredia patient's wife advised of precancerous results  She voiced understanding and did not have any further questions

## 2020-04-22 ENCOUNTER — TELEPHONE (OUTPATIENT)
Dept: GASTROENTEROLOGY | Facility: CLINIC | Age: 80
End: 2020-04-22

## 2020-06-08 DIAGNOSIS — E11.9 TYPE 2 DIABETES MELLITUS WITHOUT COMPLICATION, WITHOUT LONG-TERM CURRENT USE OF INSULIN (HCC): Primary | ICD-10-CM

## 2020-08-07 ENCOUNTER — TELEPHONE (OUTPATIENT)
Dept: INTERNAL MEDICINE CLINIC | Facility: CLINIC | Age: 80
End: 2020-08-07

## 2020-08-07 DIAGNOSIS — Z12.5 PROSTATE CANCER SCREENING: ICD-10-CM

## 2020-08-07 DIAGNOSIS — E11.9 TYPE 2 DIABETES MELLITUS WITHOUT COMPLICATION, WITHOUT LONG-TERM CURRENT USE OF INSULIN (HCC): Primary | ICD-10-CM

## 2020-08-07 NOTE — TELEPHONE ENCOUNTER
Emma Presley has an appt on 8/27  Are there any lab orders that need to be done for this appointment  The patient is diabetic and is concerned about A1C  Advise wife Libby Alberto if any orders are required

## 2020-08-10 ENCOUNTER — APPOINTMENT (OUTPATIENT)
Dept: LAB | Facility: CLINIC | Age: 80
End: 2020-08-10
Payer: MEDICARE

## 2020-08-10 DIAGNOSIS — E11.9 TYPE 2 DIABETES MELLITUS WITHOUT COMPLICATION, WITHOUT LONG-TERM CURRENT USE OF INSULIN (HCC): ICD-10-CM

## 2020-08-10 DIAGNOSIS — Z12.5 PROSTATE CANCER SCREENING: ICD-10-CM

## 2020-08-10 LAB
ALBUMIN SERPL BCP-MCNC: 3.7 G/DL (ref 3.5–5)
ALP SERPL-CCNC: 62 U/L (ref 46–116)
ALT SERPL W P-5'-P-CCNC: 19 U/L (ref 12–78)
ANION GAP SERPL CALCULATED.3IONS-SCNC: 5 MMOL/L (ref 4–13)
AST SERPL W P-5'-P-CCNC: 6 U/L (ref 5–45)
BASOPHILS # BLD AUTO: 0.04 THOUSANDS/ΜL (ref 0–0.1)
BASOPHILS NFR BLD AUTO: 1 % (ref 0–1)
BILIRUB SERPL-MCNC: 0.5 MG/DL (ref 0.2–1)
BILIRUB UR QL STRIP: NEGATIVE
BUN SERPL-MCNC: 12 MG/DL (ref 5–25)
CALCIUM SERPL-MCNC: 8.9 MG/DL (ref 8.3–10.1)
CHLORIDE SERPL-SCNC: 109 MMOL/L (ref 100–108)
CLARITY UR: CLEAR
CO2 SERPL-SCNC: 28 MMOL/L (ref 21–32)
COLOR UR: YELLOW
CREAT SERPL-MCNC: 1.28 MG/DL (ref 0.6–1.3)
CREAT UR-MCNC: 106 MG/DL
EOSINOPHIL # BLD AUTO: 0.05 THOUSAND/ΜL (ref 0–0.61)
EOSINOPHIL NFR BLD AUTO: 1 % (ref 0–6)
ERYTHROCYTE [DISTWIDTH] IN BLOOD BY AUTOMATED COUNT: 14.4 % (ref 11.6–15.1)
EST. AVERAGE GLUCOSE BLD GHB EST-MCNC: 154 MG/DL
GFR SERPL CREATININE-BSD FRML MDRD: 62 ML/MIN/1.73SQ M
GLUCOSE P FAST SERPL-MCNC: 126 MG/DL (ref 65–99)
GLUCOSE UR STRIP-MCNC: NEGATIVE MG/DL
HBA1C MFR BLD: 7 %
HCT VFR BLD AUTO: 42.5 % (ref 36.5–49.3)
HGB BLD-MCNC: 13.7 G/DL (ref 12–17)
HGB UR QL STRIP.AUTO: NEGATIVE
IMM GRANULOCYTES # BLD AUTO: 0.02 THOUSAND/UL (ref 0–0.2)
IMM GRANULOCYTES NFR BLD AUTO: 0 % (ref 0–2)
KETONES UR STRIP-MCNC: NEGATIVE MG/DL
LEUKOCYTE ESTERASE UR QL STRIP: NEGATIVE
LYMPHOCYTES # BLD AUTO: 1.66 THOUSANDS/ΜL (ref 0.6–4.47)
LYMPHOCYTES NFR BLD AUTO: 30 % (ref 14–44)
MCH RBC QN AUTO: 27.2 PG (ref 26.8–34.3)
MCHC RBC AUTO-ENTMCNC: 32.2 G/DL (ref 31.4–37.4)
MCV RBC AUTO: 85 FL (ref 82–98)
MICROALBUMIN UR-MCNC: 30 MG/L (ref 0–20)
MICROALBUMIN/CREAT 24H UR: 28 MG/G CREATININE (ref 0–30)
MONOCYTES # BLD AUTO: 0.39 THOUSAND/ΜL (ref 0.17–1.22)
MONOCYTES NFR BLD AUTO: 7 % (ref 4–12)
NEUTROPHILS # BLD AUTO: 3.31 THOUSANDS/ΜL (ref 1.85–7.62)
NEUTS SEG NFR BLD AUTO: 61 % (ref 43–75)
NITRITE UR QL STRIP: NEGATIVE
NRBC BLD AUTO-RTO: 0 /100 WBCS
PH UR STRIP.AUTO: 6 [PH]
PLATELET # BLD AUTO: 293 THOUSANDS/UL (ref 149–390)
PMV BLD AUTO: 10.5 FL (ref 8.9–12.7)
POTASSIUM SERPL-SCNC: 4.2 MMOL/L (ref 3.5–5.3)
PROT SERPL-MCNC: 7.5 G/DL (ref 6.4–8.2)
PROT UR STRIP-MCNC: NEGATIVE MG/DL
PSA SERPL-MCNC: 1.4 NG/ML (ref 0–4)
RBC # BLD AUTO: 5.03 MILLION/UL (ref 3.88–5.62)
SODIUM SERPL-SCNC: 142 MMOL/L (ref 136–145)
SP GR UR STRIP.AUTO: 1.02 (ref 1–1.03)
TSH SERPL DL<=0.05 MIU/L-ACNC: 2.68 UIU/ML (ref 0.36–3.74)
UROBILINOGEN UR QL STRIP.AUTO: 0.2 E.U./DL
WBC # BLD AUTO: 5.47 THOUSAND/UL (ref 4.31–10.16)

## 2020-08-10 PROCEDURE — G0103 PSA SCREENING: HCPCS

## 2020-08-10 PROCEDURE — 81003 URINALYSIS AUTO W/O SCOPE: CPT | Performed by: INTERNAL MEDICINE

## 2020-08-10 PROCEDURE — 85025 COMPLETE CBC W/AUTO DIFF WBC: CPT

## 2020-08-10 PROCEDURE — 82570 ASSAY OF URINE CREATININE: CPT | Performed by: INTERNAL MEDICINE

## 2020-08-10 PROCEDURE — 82043 UR ALBUMIN QUANTITATIVE: CPT | Performed by: INTERNAL MEDICINE

## 2020-08-10 PROCEDURE — 36415 COLL VENOUS BLD VENIPUNCTURE: CPT

## 2020-08-10 PROCEDURE — 84443 ASSAY THYROID STIM HORMONE: CPT

## 2020-08-10 PROCEDURE — 83036 HEMOGLOBIN GLYCOSYLATED A1C: CPT

## 2020-08-10 PROCEDURE — 80053 COMPREHEN METABOLIC PANEL: CPT

## 2020-08-27 ENCOUNTER — OFFICE VISIT (OUTPATIENT)
Dept: INTERNAL MEDICINE CLINIC | Facility: CLINIC | Age: 80
End: 2020-08-27
Payer: MEDICARE

## 2020-08-27 VITALS
SYSTOLIC BLOOD PRESSURE: 132 MMHG | DIASTOLIC BLOOD PRESSURE: 72 MMHG | OXYGEN SATURATION: 98 % | HEIGHT: 74 IN | WEIGHT: 195 LBS | TEMPERATURE: 96.5 F | BODY MASS INDEX: 25.03 KG/M2 | HEART RATE: 60 BPM

## 2020-08-27 DIAGNOSIS — I48.20 CHRONIC ATRIAL FIBRILLATION (HCC): ICD-10-CM

## 2020-08-27 DIAGNOSIS — I10 ESSENTIAL HYPERTENSION: ICD-10-CM

## 2020-08-27 DIAGNOSIS — E11.9 TYPE 2 DIABETES MELLITUS WITHOUT COMPLICATION, WITHOUT LONG-TERM CURRENT USE OF INSULIN (HCC): Primary | ICD-10-CM

## 2020-08-27 PROCEDURE — 4040F PNEUMOC VAC/ADMIN/RCVD: CPT | Performed by: INTERNAL MEDICINE

## 2020-08-27 PROCEDURE — 3008F BODY MASS INDEX DOCD: CPT | Performed by: INTERNAL MEDICINE

## 2020-08-27 PROCEDURE — 3060F POS MICROALBUMINURIA REV: CPT | Performed by: INTERNAL MEDICINE

## 2020-08-27 PROCEDURE — 3075F SYST BP GE 130 - 139MM HG: CPT | Performed by: INTERNAL MEDICINE

## 2020-08-27 PROCEDURE — 3051F HG A1C>EQUAL 7.0%<8.0%: CPT | Performed by: INTERNAL MEDICINE

## 2020-08-27 PROCEDURE — 3078F DIAST BP <80 MM HG: CPT | Performed by: INTERNAL MEDICINE

## 2020-08-27 PROCEDURE — 1160F RVW MEDS BY RX/DR IN RCRD: CPT | Performed by: INTERNAL MEDICINE

## 2020-08-27 PROCEDURE — 1036F TOBACCO NON-USER: CPT | Performed by: INTERNAL MEDICINE

## 2020-08-27 PROCEDURE — 3066F NEPHROPATHY DOC TX: CPT | Performed by: INTERNAL MEDICINE

## 2020-08-27 PROCEDURE — 99213 OFFICE O/P EST LOW 20 MIN: CPT | Performed by: INTERNAL MEDICINE

## 2020-08-27 NOTE — PROGRESS NOTES
Assessment/Plan:       Diagnoses and all orders for this visit:    Type 2 diabetes mellitus without complication, without long-term current use of insulin (HCC)    Essential hypertension    Chronic atrial fibrillation (HCC)                Subjective:      Patient ID: Liam Guardado is a 68 y o  male  Follow-up visit of this 47-year-old man who looks 21 years younger than stated age   He is diabetic, hypertensive, and dyslipidemic  He has chronic kidney disease stage 2 and follows with nephrology  Hemoglobin A1c done 2019 was 7 1%  now, a repeat on  was 7 0    No cigarettes, no alcohol, no illicit drugs    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  History of sick sinus syndrome  He has a pacemaker  This was placed in 2016  A complication of care was 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  He fibrillating so is on anticoagulation anyway  Right now on Coumadin because he cannot afford Eliquis  Globally feels well  Mom  from stroke, father  from heart related illness  Both a relatively young ages  The patient had a colonoscopy in  and apparently it is due this year; had a polyp  The patient had bilateral cataract surgery  He had kidney stones  He has diverticulosis  A recent EKG done at [facility] shows 100% pacing  The following portions of the patient's history were reviewed and updated as appropriate:   He has a past medical history of Chronic kidney disease, Colon polyp, 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 ([address] ), Diabetes mellitus ([address] ), Diverticulosis, History of cardiac cath, Kidney stone, and SSS (sick sinus syndrome) ([address] )  ,  does not have any pertinent problems on file  ,   has a past surgical history that includes Replacement total knee; Cataract extraction; Colonoscopy; Cystostomy; EGD AND COLONOSCOPY; Anal manometry (10/06/2015); and Cardiac pacemaker placement  ,  family history includes No Known Problems in his father and mother  ,   reports that he has quit smoking  He has never used smokeless tobacco  He reports that he does not drink alcohol or use drugs  ,  has No Known Allergies     Current Outpatient Medications   Medication Sig Dispense Refill    acetaminophen (TYLENOL) 500 mg tablet Take by mouth       amLODIPine (NORVASC) 5 mg tablet       metFORMIN (GLUCOPHAGE-XR) 500 mg 24 hr tablet Take 3 tablets (1,500 mg total) by mouth daily with breakfast 270 tablet 3    Multiple Vitamins-Minerals (MULTIVITAMIN ADULT PO) Take 1 tablet by mouth daily      oxybutynin (DITROPAN-XL) 10 MG 24 hr tablet Take 10 mg by mouth daily at bedtime      saxagliptin (Onglyza) 5 MG tablet Take 1 tablet (5 mg total) by mouth daily 90 tablet 3    simvastatin (ZOCOR) 20 mg tablet Take 1 tablet by mouth daily      warfarin (COUMADIN) 7 5 mg tablet Take 7 5 mg by mouth daily        No current facility-administered medications for this visit  Review of Systems   Musculoskeletal: Positive for arthralgias  All other systems reviewed and are negative  Objective:  Vitals:    08/27/20 0959   BP: 132/72   Pulse: 60   Temp: (!) 96 5 °F (35 8 °C)   SpO2: 98%      Physical Exam  Constitutional:       Appearance: Normal appearance  Cardiovascular:      Rate and Rhythm: Normal rate  Neurological:      General: No focal deficit present  Mental Status: He is alert  There are no Patient Instructions on file for this visit

## 2020-08-27 NOTE — PATIENT INSTRUCTIONS
A 44-year-old man doing well in metabolic parameters  Tubular adenoma documented in March 2020  Misty Hutchinson     See me in 6

## 2020-10-02 ENCOUNTER — CLINICAL SUPPORT (OUTPATIENT)
Dept: INTERNAL MEDICINE CLINIC | Facility: CLINIC | Age: 80
End: 2020-10-02
Payer: MEDICARE

## 2020-10-02 VITALS — TEMPERATURE: 98.4 F

## 2020-10-02 DIAGNOSIS — Z23 NEED FOR VACCINATION: Primary | ICD-10-CM

## 2020-10-02 PROCEDURE — G0008 ADMIN INFLUENZA VIRUS VAC: HCPCS

## 2020-10-02 PROCEDURE — 90662 IIV NO PRSV INCREASED AG IM: CPT

## 2020-10-28 DIAGNOSIS — E78.2 MIXED HYPERLIPIDEMIA: Primary | ICD-10-CM

## 2020-10-28 RX ORDER — SIMVASTATIN 20 MG
20 TABLET ORAL DAILY
Qty: 90 TABLET | Refills: 3 | Status: SHIPPED | OUTPATIENT
Start: 2020-10-28

## 2021-02-03 LAB
CREAT ?TM UR-SCNC: 109 UMOL/L
CREAT ?TM UR-SCNC: 109 UMOL/L
EXT PROTEIN URINE: 20.7
EXT PROTEIN URINE: 20.7
PROT/CREAT UR: 0.19 MG/G{CREAT}
PROT/CREAT UR: 0.19 MG/G{CREAT}

## 2021-02-04 ENCOUNTER — DOCUMENTATION (OUTPATIENT)
Dept: NEPHROLOGY | Facility: CLINIC | Age: 81
End: 2021-02-04

## 2021-02-04 LAB
CREAT ?TM UR-SCNC: 109 UMOL/L
EXT DIFF-ABS BASOPHILS: 0
EXT DIFF-ABS EOSINOPHILS: 0.1
EXT DIFF-ABS LYMPHOCYTES: 1.7
EXT DIFF-ABS MONOCYTES: 0.3
EXT DIFF-ABS NEUTROPHILS: 2.5
EXT GLUCOSE BLD: 152
EXT PROTEIN URINE: 207
EXTERNAL ANION GAP: 6
EXTERNAL BUN: 13
EXTERNAL CALCIUM: 9.3
EXTERNAL CHLORIDE: 108
EXTERNAL CO2: 28
EXTERNAL CREATININE: 1.26
EXTERNAL EGFR: 54
EXTERNAL HEMATOCRIT: 40 %
EXTERNAL HEMOGLOBIN: 13.3 G/DL
EXTERNAL MCV: 83
EXTERNAL PHOSPHORUS: 2.6
EXTERNAL PLATELET COUNT: 298 K/ΜL
EXTERNAL POTASSIUM: 3.9
EXTERNAL PTH: 43.2
EXTERNAL RBC: 4.84
EXTERNAL RDW: 14.9
EXTERNAL SODIUM: 142
EXTERNAL VITAMIN D,25: 36
EXTERNAL WBC: 4.5
HGB UR QL STRIP.AUTO: NEGATIVE
KETONES UR STRIP-MCNC: NEGATIVE MG/DL
LEUKOCYTE ESTERASE UR QL STRIP: NEGATIVE
NITRITE UR QL STRIP: NEGATIVE
PH SMN: 5 [PH]
PROT UR STRIP-MCNC: NEGATIVE MG/DL
PROT/CREAT UR: 0.19 MG/G{CREAT}
SP GR UR: 1.01

## 2021-02-09 ENCOUNTER — OFFICE VISIT (OUTPATIENT)
Dept: NEPHROLOGY | Facility: CLINIC | Age: 81
End: 2021-02-09
Payer: MEDICARE

## 2021-02-09 VITALS
BODY MASS INDEX: 26.1 KG/M2 | HEIGHT: 74 IN | SYSTOLIC BLOOD PRESSURE: 120 MMHG | WEIGHT: 203.4 LBS | RESPIRATION RATE: 16 BRPM | DIASTOLIC BLOOD PRESSURE: 80 MMHG | TEMPERATURE: 96.8 F | HEART RATE: 68 BPM

## 2021-02-09 DIAGNOSIS — I48.20 CHRONIC ATRIAL FIBRILLATION (HCC): ICD-10-CM

## 2021-02-09 DIAGNOSIS — N18.2 STAGE 2 CHRONIC KIDNEY DISEASE: Primary | ICD-10-CM

## 2021-02-09 DIAGNOSIS — M89.9 CHRONIC KIDNEY DISEASE-MINERAL AND BONE DISORDER: ICD-10-CM

## 2021-02-09 DIAGNOSIS — E11.9 TYPE 2 DIABETES MELLITUS WITHOUT COMPLICATION, WITHOUT LONG-TERM CURRENT USE OF INSULIN (HCC): ICD-10-CM

## 2021-02-09 DIAGNOSIS — I10 ESSENTIAL HYPERTENSION: ICD-10-CM

## 2021-02-09 DIAGNOSIS — E83.9 CHRONIC KIDNEY DISEASE-MINERAL AND BONE DISORDER: ICD-10-CM

## 2021-02-09 DIAGNOSIS — N18.9 CHRONIC KIDNEY DISEASE-MINERAL AND BONE DISORDER: ICD-10-CM

## 2021-02-09 PROCEDURE — 99214 OFFICE O/P EST MOD 30 MIN: CPT | Performed by: INTERNAL MEDICINE

## 2021-02-09 NOTE — PATIENT INSTRUCTIONS

## 2021-02-09 NOTE — ASSESSMENT & PLAN NOTE
Lab Results   Component Value Date    EGFR 54 02/03/2021    EGFR 62 08/10/2020    EGFR 56 01/27/2020    CREATININE 1 26 02/03/2021    CREATININE 1 28 08/10/2020    CREATININE 1 22 01/27/2020    PTH and phosphorus along with vitamin-D are within acceptable range and will continue to monitor

## 2021-02-09 NOTE — ASSESSMENT & PLAN NOTE
Lab Results   Component Value Date    EGFR 54 02/03/2021    EGFR 62 08/10/2020    EGFR 56 01/27/2020    CREATININE 1 26 02/03/2021    CREATININE 1 28 08/10/2020    CREATININE 1 22 01/27/2020    patient GFR is fluctuating overall seems to be stable between  GFR of 55 to 65    Advised hydration and avoiding any nephrotoxic medicine

## 2021-02-09 NOTE — PROGRESS NOTES
NEPHROLOGY OFFICE FOLLOW UP  Regan Penn 80 y o  male MRN: [id number]    Encounter: [id number] 2/9/2021    REASON FOR VISIT: Regan Penn is a 80 y o  male who is here on 2/9/2021 for Chronic Kidney Disease and Follow-up    HPI:     Guillermo Gayle in today for yearly nephrology follow-up  53-year-old gentleman with history of hypertension and proteinuria  He is overall doing quite well  Denies any complaint     No chest pain no palpitation or shortness of breath     Nothing much new happen since his last visit      REVIEW OF SYSTEMS:    Review of Systems   Constitutional: Negative for activity change and fatigue  HENT: Negative for congestion and ear discharge  Eyes: Negative for photophobia and pain  Respiratory: Negative for apnea and choking  Cardiovascular: Negative for chest pain and palpitations  Gastrointestinal: Negative for abdominal distention and blood in stool  Endocrine: Negative for heat intolerance and polyphagia  Genitourinary: Negative for flank pain and urgency  Musculoskeletal: Negative for neck pain and neck stiffness  Skin: Negative for color change and wound  Allergic/Immunologic: Negative for food allergies and immunocompromised state  Neurological: Negative for seizures and facial asymmetry  Hematological: Negative for adenopathy  Does not bruise/bleed easily  Psychiatric/Behavioral: Negative for self-injury and suicidal ideas           PAST MEDICAL HISTORY:  Past Medical History:   Diagnosis Date    Chronic kidney disease     Colon polyp     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)     Diabetes mellitus ([facility]     Diverticulosis     History of cardiac cath     Kidney stone     SSS (sick sinus syndrome) ([facility] )        PAST SURGICAL HISTORY:  Past Surgical History:   Procedure Laterality Date    ANAL MANOMETRY  10/06/2015    CARDIAC PACEMAKER PLACEMENT      CATARACT EXTRACTION      COLONOSCOPY      CYSTOSTOMY      EGD AND COLONOSCOPY      REPLACEMENT TOTAL KNEE         SOCIAL HISTORY:  Social History     Substance and Sexual Activity   Alcohol Use No     Social History     Substance and Sexual Activity   Drug Use No     Social History     Tobacco Use   Smoking Status Former Smoker   Smokeless Tobacco Never Used       FAMILY HISTORY:  Family History   Problem Relation Age of Onset    Heart disease Mother     Heart disease Father        MEDICATIONS:    Current Outpatient Medications:     acetaminophen (TYLENOL) 500 mg tablet, Take by mouth , Disp: , Rfl:     amLODIPine (NORVASC) 5 mg tablet, , Disp: , Rfl:     metFORMIN (GLUCOPHAGE-XR) 500 mg 24 hr tablet, Take 3 tablets (1,500 mg total) by mouth daily with breakfast, Disp: 270 tablet, Rfl: 3    Multiple Vitamins-Minerals (MULTIVITAMIN ADULT PO), Take 1 tablet by mouth daily, Disp: , Rfl:     oxybutynin (DITROPAN-XL) 10 MG 24 hr tablet, Take 10 mg by mouth daily at bedtime, Disp: , Rfl:     simvastatin (ZOCOR) 20 mg tablet, Take 1 tablet (20 mg total) by mouth daily, Disp: 90 tablet, Rfl: 3    warfarin (COUMADIN) 7 5 mg tablet, Take 7 5 mg by mouth daily , Disp: , Rfl:     saxagliptin (Onglyza) 5 MG tablet, Take 1 tablet (5 mg total) by mouth daily (Patient not taking: Reported on 2/9/2021), Disp: 90 tablet, Rfl: 3    PHYSICAL EXAM:  Vitals:    02/09/21 0916   BP: 120/80   BP Location: Right arm   Patient Position: Sitting   Pulse: 68   Resp: 16   Temp: (!) 96 8 °F (36 °C)   TempSrc: Temporal   Weight: 92 3 kg (203 lb 6 4 oz)   Height: 6' 2" (1 88 m)     Body mass index is 26 12 kg/m²  Physical Exam  Constitutional:       General: He is not in acute distress  Appearance: Normal appearance  He is well-developed  HENT:      Head: Normocephalic  Mouth/Throat:      Mouth: Mucous membranes are moist    Eyes:      General: No scleral icterus  Extraocular Movements: Extraocular movements intact        Conjunctiva/sclera: Conjunctivae normal       Pupils: Pupils are equal, round, and reactive to light  Neck:      Musculoskeletal: Normal range of motion and neck supple  Vascular: No JVD  Cardiovascular:      Rate and Rhythm: Normal rate and regular rhythm  Heart sounds: Normal heart sounds  Pulmonary:      Effort: Pulmonary effort is normal       Breath sounds: Normal breath sounds  No wheezing  Abdominal:      General: Bowel sounds are normal       Palpations: Abdomen is soft  Tenderness: There is no abdominal tenderness  Musculoskeletal: Normal range of motion  Skin:     General: Skin is warm  Findings: No rash  Neurological:      Mental Status: He is alert and oriented to person, place, and time  Psychiatric:         Behavior: Behavior normal          LAB RESULTS:  Results for orders placed or performed in visit on 02/04/21   Phosphorus   Result Value Ref Range    EXTERNAL PHOSPHORUS 2 6    UA (URINE) with reflex to Scope   Result Value Ref Range    Specific Gravity, Urine 1 014     pH 5     Protein, UA Negative Negative mg/dl    GLUCOSE RANDOM      Ketones, UA Negative Negative mg/dl    Blood, UA Negative     Leukocytes, UA Negative Negative    Nitrite, UA Negative Negative   Vitamin D 25 hydroxy   Result Value Ref Range    EXTERNAL VITAMIN D,25 36    CBC and differential   Result Value Ref Range    WBC 4 5     External RBC 4 84     External Hemoglobin 13 3 g/dL    Hematocrit 40 %    MCV 83     External RDW 14 9     External Platelets 642 K/µL    Abs Neutrophils 2 5     Abs Lymphocytes 1 7     External Abs Monocytes  0 3     External Abs Eosinophils 0 1     External Abs Basophils  0 0        ASSESSMENT and PLAN:      Stage 2 chronic kidney disease  Lab Results   Component Value Date    EGFR 54 02/03/2021    EGFR 62 08/10/2020    EGFR 56 01/27/2020    CREATININE 1 26 02/03/2021    CREATININE 1 28 08/10/2020    CREATININE 1 22 01/27/2020    patient GFR is fluctuating overall seems to be stable between  GFR of 55 to 65    Advised hydration and avoiding any nephrotoxic medicine    Chronic kidney disease-mineral and bone disorder  Lab Results   Component Value Date    EGFR 54 02/03/2021    EGFR 62 08/10/2020    EGFR 56 01/27/2020    CREATININE 1 26 02/03/2021    CREATININE 1 28 08/10/2020    CREATININE 1 22 01/27/2020    PTH and phosphorus along with vitamin-D are within acceptable range and will continue to monitor    Essential hypertension   Blood pressure is quite well controlled with present medication which will continue    Type 2 diabetes mellitus without complication ([facility] [address] )    Lab Results   Component Value Date    HGBA1C 7 7 (H) 12/15/2020    diabetes not well controlled though he has different nephrologist and they are working with her         I will see him back in 1 year  Will get blood and urine test before that visit      Portions of the record may have been created with voice recognition software  Occasional wrong word or "sound a like" substitutions may have occurred due to the inherent limitations of voice recognition software  Read the chart carefully and recognize, using context, where substitutions have occurred  If you have any questions, please contact the dictating provider

## 2021-02-09 NOTE — ASSESSMENT & PLAN NOTE
Lab Results   Component Value Date    HGBA1C 7 7 (H) 12/15/2020    diabetes not well controlled though he has different nephrologist and they are working with her

## 2021-02-18 ENCOUNTER — TELEMEDICINE (OUTPATIENT)
Dept: INTERNAL MEDICINE CLINIC | Facility: CLINIC | Age: 81
End: 2021-02-18
Payer: MEDICARE

## 2021-02-18 VITALS
HEIGHT: 74 IN | HEART RATE: 68 BPM | DIASTOLIC BLOOD PRESSURE: 78 MMHG | BODY MASS INDEX: 25.41 KG/M2 | SYSTOLIC BLOOD PRESSURE: 120 MMHG | WEIGHT: 198 LBS

## 2021-02-18 DIAGNOSIS — I10 ESSENTIAL HYPERTENSION: ICD-10-CM

## 2021-02-18 DIAGNOSIS — I48.20 CHRONIC ATRIAL FIBRILLATION (HCC): ICD-10-CM

## 2021-02-18 DIAGNOSIS — E11.9 TYPE 2 DIABETES MELLITUS WITHOUT COMPLICATION, WITHOUT LONG-TERM CURRENT USE OF INSULIN (HCC): Primary | ICD-10-CM

## 2021-02-18 DIAGNOSIS — N18.2 STAGE 2 CHRONIC KIDNEY DISEASE: ICD-10-CM

## 2021-02-18 LAB — SL AMB POCT GLUCOSE BLD: 132

## 2021-02-18 PROCEDURE — 99214 OFFICE O/P EST MOD 30 MIN: CPT | Performed by: INTERNAL MEDICINE

## 2021-02-18 NOTE — PATIENT INSTRUCTIONS
Multiple chronic problems are addressed at multiple levels and are quite stable  The patient is asymptomatic  Apparently endocrinology will recheck his A1c shortly  No interventions at this time   Revisit with me in June or July

## 2021-02-18 NOTE — PROGRESS NOTES
Virtual Regular Visit      Assessment/Plan:    Problem List Items Addressed This Visit        Endocrine    Type 2 diabetes mellitus without complication ([facility] [address] ) - Primary    Relevant Orders    POCT blood glucose (Completed)       Cardiovascular and Mediastinum    Essential hypertension    Chronic atrial fibrillation (HCC)       Genitourinary    Stage 2 chronic kidney disease          BMI Counseling: There is no height or weight on file to calculate BMI  The BMI is above normal  Nutrition recommendations include decreasing portion sizes and moderation in carbohydrate intake  Exercise recommendations include moderate physical activity 150 minutes/week  Reason for visit is   Chief Complaint   Patient presents with    Virtual Regular Visit        Encounter provider Brittany Grove MD    Provider located at [address] [place] [address]      Recent Visits  No visits were found meeting these conditions  Showing recent visits within past 7 days and meeting all other requirements     Today's Visits  Date Type Provider Dept   02/18/21 Telemedicine Brittany Grove, [address] [place] [address] today's visits and meeting all other requirements     Future Appointments  No visits were found meeting these conditions  Showing future appointments within next 150 days and meeting all other requirements        The patient was identified by name and date of birth  Emiliana Correia was informed that this is a telemedicine visit and that the visit is being conducted through [address] [place] [address] and patient was informed that this is not a secure, HIPAA-compliant platform  He agrees to proceed     My office door was closed  No one else was in the room  He acknowledged consent and understanding of privacy and security of the video platform  The patient has agreed to participate and understands they can discontinue the visit at any time      Patient is aware this is a billable service  Subjective  Ameya Vasquez is a 80 y o  male    Follow-up visit of this 47-year-old man who looks 21 years younger than stated age   He is diabetic, hypertensive, and dyslipidemic  He has chronic kidney disease stage 2 and follows with nephrology  Hemoglobin A1c done 2019 was 7 1%  now, a repeat on  was 7 0 follows with [facility] and will get it done again in a few months  Hypertension control with medications; monitored at home today and 120/70   Diabetic control has been decent with A1c not more than 7 1   Lipids are treated   CKD has been stable with no symptoms referable to it         No cigarettes, no alcohol, no illicit drugs    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  History of sick sinus syndrome  He has a pacemaker  This was placed in   A complication of care was 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  He fibrillating so is on anticoagulation anyway  Right now on Coumadin because he cannot afford Eliquis  Globally feels well  Mom  from stroke, father  from heart related illness  Both a relatively young ages  The patient had a colonoscopy in  2020  Next in 5 years  The patient had bilateral cataract surgery  He had kidney stones  He has diverticulosis  A recent EKG done at [facility] shows 100% pacing         Past Medical History:   Diagnosis Date    Chronic kidney disease     Colon polyp     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)     Diabetes mellitus ([address]     Diverticulosis     History of cardiac cath     Kidney stone     SSS (sick sinus syndrome) ([address] )        Past Surgical History:   Procedure Laterality Date    ANAL MANOMETRY  10/06/2015    CARDIAC PACEMAKER PLACEMENT      CATARACT EXTRACTION      COLONOSCOPY      CYSTOSTOMY      EGD AND COLONOSCOPY      REPLACEMENT TOTAL KNEE         Current Outpatient Medications   Medication Sig Dispense Refill    acetaminophen (TYLENOL) 500 mg tablet Take by mouth       amLODIPine (NORVASC) 5 mg tablet       metFORMIN (GLUCOPHAGE-XR) 500 mg 24 hr tablet Take 3 tablets (1,500 mg total) by mouth daily with breakfast 270 tablet 3    Multiple Vitamins-Minerals (MULTIVITAMIN ADULT PO) Take 1 tablet by mouth daily      oxybutynin (DITROPAN-XL) 10 MG 24 hr tablet Take 10 mg by mouth daily at bedtime      simvastatin (ZOCOR) 20 mg tablet Take 1 tablet (20 mg total) by mouth daily 90 tablet 3    warfarin (COUMADIN) 7 5 mg tablet Take 7 5 mg by mouth daily        No current facility-administered medications for this visit  No Known Allergies    Review of Systems   Constitutional: Negative for chills and fever  HENT: Negative for sore throat and trouble swallowing  Eyes: Negative for pain  Respiratory: Negative for cough and shortness of breath  Cardiovascular: Negative for chest pain and palpitations  Gastrointestinal: Negative for abdominal pain, blood in stool, diarrhea, nausea and vomiting  Endocrine: Negative for cold intolerance and heat intolerance  Genitourinary: Negative for dysuria, frequency and testicular pain  Musculoskeletal: Negative for joint swelling  Allergic/Immunologic: Negative for immunocompromised state  Neurological: Negative for dizziness, syncope and headaches  Hematological: Negative for adenopathy  Does not bruise/bleed easily  Psychiatric/Behavioral: Negative for dysphoric mood  The patient is not nervous/anxious  Video Exam    Vitals:    02/18/21 0912   BP: 120/78   Pulse: 68   Weight: 89 8 kg (198 lb)   Height: 6' 2" (1 88 m)       Physical Exam  Constitutional:       Appearance: Normal appearance  HENT:      Head: Normocephalic and atraumatic  Eyes:      General: No scleral icterus  Neck:      Musculoskeletal: Normal range of motion     Pulmonary:      Effort: Pulmonary effort is normal  Musculoskeletal: Normal range of motion  Neurological:      General: No focal deficit present  Mental Status: He is alert and oriented to person, place, and time  Psychiatric:         Mood and Affect: Mood normal          Behavior: Behavior normal           I spent   10 minutes directly with the patient during this visit      VIRTUAL VISIT [address] acknowledges that he has consented to an online visit or consultation  He understands that the online visit is based solely on information provided by him, and that, in the absence of a face-to-face physical evaluation by the physician, the diagnosis he receives is both limited and provisional in terms of accuracy and completeness  This is not intended to replace a full medical face-to-face evaluation by the physician  Jair Zamudio understands and accepts these terms

## 2021-06-03 ENCOUNTER — APPOINTMENT (EMERGENCY)
Dept: CT IMAGING | Facility: HOSPITAL | Age: 81
End: 2021-06-03
Payer: MEDICARE

## 2021-06-03 ENCOUNTER — HOSPITAL ENCOUNTER (EMERGENCY)
Facility: HOSPITAL | Age: 81
Discharge: HOME | End: 2021-06-03
Attending: EMERGENCY MEDICINE | Admitting: EMERGENCY MEDICINE
Payer: MEDICARE

## 2021-06-03 VITALS
OXYGEN SATURATION: 96 % | SYSTOLIC BLOOD PRESSURE: 149 MMHG | RESPIRATION RATE: 16 BRPM | HEART RATE: 65 BPM | TEMPERATURE: 98.6 F | DIASTOLIC BLOOD PRESSURE: 72 MMHG

## 2021-06-03 DIAGNOSIS — J32.9 SINUSITIS: ICD-10-CM

## 2021-06-03 DIAGNOSIS — R51.9 HEADACHE: Primary | ICD-10-CM

## 2021-06-03 LAB
ALBUMIN SERPL BCP-MCNC: 3.6 G/DL (ref 3.5–5)
ALP SERPL-CCNC: 70 U/L (ref 46–116)
ALT SERPL W P-5'-P-CCNC: 18 U/L (ref 12–78)
ANION GAP SERPL CALCULATED.3IONS-SCNC: 7 MMOL/L (ref 4–13)
APTT PPP: 54 SECONDS (ref 23–37)
AST SERPL W P-5'-P-CCNC: 9 U/L (ref 5–45)
BASOPHILS # BLD AUTO: 0.03 THOUSANDS/ΜL (ref 0–0.1)
BASOPHILS NFR BLD AUTO: 1 % (ref 0–1)
BILIRUB SERPL-MCNC: 0.39 MG/DL (ref 0.2–1)
BUN SERPL-MCNC: 13 MG/DL (ref 5–25)
CALCIUM SERPL-MCNC: 8.7 MG/DL (ref 8.3–10.1)
CHLORIDE SERPL-SCNC: 106 MMOL/L (ref 100–108)
CO2 SERPL-SCNC: 28 MMOL/L (ref 21–32)
CREAT SERPL-MCNC: 1.42 MG/DL (ref 0.6–1.3)
EOSINOPHIL # BLD AUTO: 0.06 THOUSAND/ΜL (ref 0–0.61)
EOSINOPHIL NFR BLD AUTO: 1 % (ref 0–6)
ERYTHROCYTE [DISTWIDTH] IN BLOOD BY AUTOMATED COUNT: 14.4 % (ref 11.6–15.1)
ERYTHROCYTE [SEDIMENTATION RATE] IN BLOOD: 4 MM/HOUR (ref 0–19)
GFR SERPL CREATININE-BSD FRML MDRD: 54 ML/MIN/1.73SQ M
GLUCOSE SERPL-MCNC: 224 MG/DL (ref 65–140)
HCT VFR BLD AUTO: 40.1 % (ref 36.5–49.3)
HGB BLD-MCNC: 12.8 G/DL (ref 12–17)
IMM GRANULOCYTES # BLD AUTO: 0.01 THOUSAND/UL (ref 0–0.2)
IMM GRANULOCYTES NFR BLD AUTO: 0 % (ref 0–2)
INR PPP: 3.48 (ref 0.84–1.19)
LYMPHOCYTES # BLD AUTO: 1.79 THOUSANDS/ΜL (ref 0.6–4.47)
LYMPHOCYTES NFR BLD AUTO: 28 % (ref 14–44)
MCH RBC QN AUTO: 26.6 PG (ref 26.8–34.3)
MCHC RBC AUTO-ENTMCNC: 31.9 G/DL (ref 31.4–37.4)
MCV RBC AUTO: 83 FL (ref 82–98)
MONOCYTES # BLD AUTO: 0.5 THOUSAND/ΜL (ref 0.17–1.22)
MONOCYTES NFR BLD AUTO: 8 % (ref 4–12)
NEUTROPHILS # BLD AUTO: 4.02 THOUSANDS/ΜL (ref 1.85–7.62)
NEUTS SEG NFR BLD AUTO: 62 % (ref 43–75)
NRBC BLD AUTO-RTO: 0 /100 WBCS
PLATELET # BLD AUTO: 300 THOUSANDS/UL (ref 149–390)
PMV BLD AUTO: 9.9 FL (ref 8.9–12.7)
POTASSIUM SERPL-SCNC: 4 MMOL/L (ref 3.5–5.3)
PROT SERPL-MCNC: 7.4 G/DL (ref 6.4–8.2)
PROTHROMBIN TIME: 33.4 SECONDS (ref 11.6–14.5)
RBC # BLD AUTO: 4.81 MILLION/UL (ref 3.88–5.62)
SODIUM SERPL-SCNC: 141 MMOL/L (ref 136–145)
WBC # BLD AUTO: 6.41 THOUSAND/UL (ref 4.31–10.16)

## 2021-06-03 PROCEDURE — 96374 THER/PROPH/DIAG INJ IV PUSH: CPT

## 2021-06-03 PROCEDURE — 85652 RBC SED RATE AUTOMATED: CPT | Performed by: EMERGENCY MEDICINE

## 2021-06-03 PROCEDURE — 85730 THROMBOPLASTIN TIME PARTIAL: CPT | Performed by: EMERGENCY MEDICINE

## 2021-06-03 PROCEDURE — 99284 EMERGENCY DEPT VISIT MOD MDM: CPT | Performed by: EMERGENCY MEDICINE

## 2021-06-03 PROCEDURE — 36415 COLL VENOUS BLD VENIPUNCTURE: CPT | Performed by: EMERGENCY MEDICINE

## 2021-06-03 PROCEDURE — 70450 CT HEAD/BRAIN W/O DYE: CPT

## 2021-06-03 PROCEDURE — 96375 TX/PRO/DX INJ NEW DRUG ADDON: CPT

## 2021-06-03 PROCEDURE — 99284 EMERGENCY DEPT VISIT MOD MDM: CPT

## 2021-06-03 PROCEDURE — 85025 COMPLETE CBC W/AUTO DIFF WBC: CPT | Performed by: EMERGENCY MEDICINE

## 2021-06-03 PROCEDURE — 80053 COMPREHEN METABOLIC PANEL: CPT | Performed by: EMERGENCY MEDICINE

## 2021-06-03 PROCEDURE — 85610 PROTHROMBIN TIME: CPT | Performed by: EMERGENCY MEDICINE

## 2021-06-03 RX ORDER — AMOXICILLIN 500 MG/1
500 CAPSULE ORAL 3 TIMES DAILY
Qty: 30 CAPSULE | Refills: 0 | Status: SHIPPED | OUTPATIENT
Start: 2021-06-03 | End: 2021-06-13

## 2021-06-03 RX ORDER — FLUTICASONE PROPIONATE 50 MCG
2 SPRAY, SUSPENSION (ML) NASAL DAILY
Qty: 16 G | Refills: 0 | Status: SHIPPED | OUTPATIENT
Start: 2021-06-03 | End: 2022-04-16 | Stop reason: ALTCHOICE

## 2021-06-03 RX ORDER — METOCLOPRAMIDE HYDROCHLORIDE 5 MG/ML
10 INJECTION INTRAMUSCULAR; INTRAVENOUS ONCE
Status: COMPLETED | OUTPATIENT
Start: 2021-06-03 | End: 2021-06-03

## 2021-06-03 RX ORDER — KETOROLAC TROMETHAMINE 30 MG/ML
15 INJECTION, SOLUTION INTRAMUSCULAR; INTRAVENOUS ONCE
Status: COMPLETED | OUTPATIENT
Start: 2021-06-03 | End: 2021-06-03

## 2021-06-03 RX ADMIN — METOCLOPRAMIDE HYDROCHLORIDE 10 MG: 5 INJECTION INTRAMUSCULAR; INTRAVENOUS at 18:00

## 2021-06-03 RX ADMIN — KETOROLAC TROMETHAMINE: 30 INJECTION, SOLUTION INTRAMUSCULAR at 18:12

## 2021-06-03 NOTE — ED PROVIDER NOTES
History  Chief Complaint   Patient presents with    Headache     Pt states he has a throbbing headache in the front of his head and in his sinuses  Pt states he took ibuprofen and allergy medicaiton with some relief but that the pain starts back up within 40 minutes      Patient is a 27-year-old male  He presents to the emergency room complaining of gradual onset headache that started about 3 days ago  It is frontal   Patient reports that feels similar to prior sinus headache that he has had  No fever or chills  No neck pain or photophobia  Not sudden onset of maximal headache  No associated motor or sensory complaints  Patient is a diabetic  He is on Coumadin for chronic atrial fibrillation  The headache is moderately severe  It is throbbing  No relief with Tylenol  Prior to Admission Medications   Prescriptions Last Dose Informant Patient Reported? Taking?    Multiple Vitamins-Minerals (MULTIVITAMIN ADULT PO)  Self Yes No   Sig: Take 1 tablet by mouth daily   acetaminophen (TYLENOL) 500 mg tablet  Self Yes No   Sig: Take by mouth    amLODIPine (NORVASC) 5 mg tablet  Self Yes No   metFORMIN (GLUCOPHAGE-XR) 500 mg 24 hr tablet   No No   Sig: Take 3 tablets (1,500 mg total) by mouth daily with breakfast   oxybutynin (DITROPAN-XL) 10 MG 24 hr tablet  Self Yes No   Sig: Take 10 mg by mouth daily at bedtime   simvastatin (ZOCOR) 20 mg tablet   No No   Sig: Take 1 tablet (20 mg total) by mouth daily   warfarin (COUMADIN) 7 5 mg tablet  Self Yes No   Sig: Take 7 5 mg by mouth daily       Facility-Administered Medications: None       Past Medical History:   Diagnosis Date    Chronic kidney disease     Colon polyp     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)     Diabetes mellitus (HCC)     Diverticulosis     History of cardiac cath     Kidney stone     SSS (sick sinus syndrome) ([facility] [address] [facility]        Past Surgical History:   Procedure Laterality Date    ANAL MANOMETRY  10/06/2015    CARDIAC PACEMAKER PLACEMENT      CATARACT EXTRACTION      COLONOSCOPY      CYSTOSTOMY      EGD AND COLONOSCOPY      REPLACEMENT TOTAL KNEE         Family History   Problem Relation Age of Onset    Heart disease Mother     Heart disease Father      I have reviewed and agree with the history as documented  E-Cigarette/Vaping    E-Cigarette Use Never User      E-Cigarette/Vaping Substances    Nicotine No     THC No     CBD No     Flavoring No     Other No     Unknown No      Social History     Tobacco Use    Smoking status: Former Smoker    Smokeless tobacco: Never Used   Substance Use Topics    Alcohol use: No    Drug use: No       Review of Systems   Constitutional: Negative for chills and fever  HENT: Positive for sinus pressure  Negative for rhinorrhea and sore throat  Eyes: Negative for pain, redness and visual disturbance  Respiratory: Negative for cough and shortness of breath  Cardiovascular: Negative for chest pain and leg swelling  Gastrointestinal: Negative for abdominal pain, diarrhea and vomiting  Endocrine: Negative for polydipsia and polyuria  Genitourinary: Negative for dysuria, frequency and hematuria  Musculoskeletal: Negative for back pain and neck pain  Skin: Negative for rash and wound  Allergic/Immunologic: Negative for immunocompromised state  Neurological: Positive for headaches  Negative for weakness and numbness  Psychiatric/Behavioral: Negative for hallucinations and suicidal ideas  All other systems reviewed and are negative  Physical Exam  Physical Exam  Vitals signs reviewed  Constitutional:       General: He is not in acute distress  Appearance: He is not toxic-appearing  HENT:      Head: Normocephalic and atraumatic  Nose: Nose normal    Eyes:      General:         Right eye: No discharge  Left eye: No discharge        Conjunctiva/sclera: Conjunctivae normal    Neck:      Musculoskeletal: Normal range of motion and neck supple  No neck rigidity  Cardiovascular:      Rate and Rhythm: Normal rate and regular rhythm  Pulses: Normal pulses  Heart sounds: Normal heart sounds  No murmur  No friction rub  No gallop  Pulmonary:      Effort: Pulmonary effort is normal  No respiratory distress  Breath sounds: Normal breath sounds  No stridor  No wheezing, rhonchi or rales  Abdominal:      General: Bowel sounds are normal  There is no distension  Palpations: Abdomen is soft  Tenderness: There is no abdominal tenderness  There is no right CVA tenderness, left CVA tenderness, guarding or rebound  Musculoskeletal: Normal range of motion  General: No swelling, tenderness, deformity or signs of injury  Skin:     General: Skin is warm and dry  Coloration: Skin is not jaundiced or pale  Findings: No bruising, erythema or rash  Neurological:      General: No focal deficit present  Mental Status: He is alert and oriented to person, place, and time  Cranial Nerves: No cranial nerve deficit or facial asymmetry  Sensory: No sensory deficit  Motor: Motor function is intact     Psychiatric:         Mood and Affect: Mood normal          Behavior: Behavior normal          Vital Signs  ED Triage Vitals [06/03/21 1600]   Temperature Pulse Respirations Blood Pressure SpO2   98 6 °F (37 °C) 85 18 (!) 191/88 98 %      Temp Source Heart Rate Source Patient Position - Orthostatic VS BP Location FiO2 (%)   Oral Monitor -- Left arm --      Pain Score       --           Vitals:    06/03/21 1600   BP: (!) 191/88   Pulse: 85         Visual Acuity      ED Medications  Medications   metoclopramide (REGLAN) injection 10 mg (has no administration in time range)       Diagnostic Studies  Results Reviewed     Procedure Component Value Units Date/Time    Sedimentation rate, automated [[id number]]     Lab Status: No result Specimen: Blood     CBC and differential [[id number]]     Lab Status: No result Specimen: Blood     Protime-INR [[id number]]     Lab Status: No result Specimen: Blood     APTT [[id number]]     Lab Status: No result Specimen: Blood     Comprehensive metabolic panel [[id number]]     Lab Status: No result Specimen: Blood                  CT head without contrast    (Results Pending)              Procedures  Procedures         ED Course                             SBIRT 20yo+      Most Recent Value   SBIRT (24 yo +)   In order to provide better care to our patients, we are screening all of our patients for alcohol and drug use  Would it be okay to ask you these screening questions? Yes Filed at: 06/03/2021 1725   Initial Alcohol Screen: US AUDIT-C    1  How often do you have a drink containing alcohol?  0 Filed at: 06/03/2021 1725   2  How many drinks containing alcohol do you have on a typical day you are drinking? 0 Filed at: 06/03/2021 1725   3b  FEMALE Any Age, or MALE 65+: How often do you have 4 or more drinks on one occassion? 0 Filed at: 06/03/2021 1725   Audit-C Score  0 Filed at: 06/03/2021 1725   LOURDES: How many times in the past year have you    Used an illegal drug or used a prescription medication for non-medical reasons? Never Filed at: 06/03/2021 1725                    MDM  Number of Diagnoses or Management Options  Diagnosis management comments: Not sudden onset of maximal headache  Doubt subarachnoid hemorrhage  No fever or meningeal signs to suggest meningitis  Head CT negative for intracranial hemorrhage  He did have sinus disease  Headache relieved with ED treatment  INR was slightly bumped  Glucose was slightly bumped  Will treat with amoxicillin and Flonase  Patient aware that both his INR and sugar may go up with his treatment and will follow closely         Amount and/or Complexity of Data Reviewed  Clinical lab tests: ordered and reviewed  Tests in the radiology section of CPT®: ordered and reviewed        Disposition  Final diagnoses:   None     ED Disposition     None      Follow-up Information    None         Patient's Medications   Discharge Prescriptions    No medications on file     No discharge procedures on file      PDMP Review     None          ED Provider  Electronically Signed by           Emelyn Delatorre MD  06/04/21 1100

## 2021-06-03 NOTE — ED NOTES
Discharge instructions reviewed, patient has no new complaints or concerns at time of discharge  Patient ambulated out of department, steady gait, vss  Patient accompanied out of department by his wife        Octavia Jamison RN  06/03/21 3886

## 2021-08-03 ENCOUNTER — HOSPITAL ENCOUNTER (EMERGENCY)
Facility: HOSPITAL | Age: 81
Discharge: HOME | End: 2021-08-03
Attending: EMERGENCY MEDICINE
Payer: MEDICARE

## 2021-08-03 ENCOUNTER — APPOINTMENT (EMERGENCY)
Dept: RADIOLOGY | Facility: HOSPITAL | Age: 81
End: 2021-08-03
Payer: MEDICARE

## 2021-08-03 VITALS
HEART RATE: 80 BPM | SYSTOLIC BLOOD PRESSURE: 172 MMHG | WEIGHT: 198 LBS | TEMPERATURE: 97.8 F | BODY MASS INDEX: 25.41 KG/M2 | DIASTOLIC BLOOD PRESSURE: 81 MMHG | RESPIRATION RATE: 18 BRPM | OXYGEN SATURATION: 97 % | HEIGHT: 74 IN

## 2021-08-03 DIAGNOSIS — S90.31XA CONTUSION OF RIGHT FOOT, INITIAL ENCOUNTER: Primary | ICD-10-CM

## 2021-08-03 PROCEDURE — 99283 EMERGENCY DEPT VISIT LOW MDM: CPT

## 2021-08-03 PROCEDURE — 73630 X-RAY EXAM OF FOOT: CPT

## 2021-08-03 PROCEDURE — 99282 EMERGENCY DEPT VISIT SF MDM: CPT | Performed by: EMERGENCY MEDICINE

## 2021-08-03 NOTE — DISCHARGE INSTRUCTIONS
Ice and elevation  Use the Ace wrap as needed  Follow up with your provider or Dr Hitesh Temple for persistent pain or problems

## 2021-08-03 NOTE — ED PROVIDER NOTES
History  Chief Complaint   Patient presents with   Enedina Churchman Fall     pt was doing yard work, pt fell onto R side, only complaint is R ankle pain, happened last night , denies head strike      HPI patient is a 30-year-old male reports doing yard work, apparently cutting a branch with a long pruning pole  Patient reports he had back way as the branch fell and apparently twisted injuring his right foot  He reports falling to the ground but denies any other injury other than his right foot  Patient reports swelling and pain over the dorsum of his right foot mid metatarsals  Patient denies any other injury  Denies any chest neck or abdominal pain  Past medical history of kidney disease, pacemaker, diabetes, diverticulitis  Family history noncontributory  Social history nonsmoker no history of drug abuse    Prior to Admission Medications   Prescriptions Last Dose Informant Patient Reported? Taking?    Multiple Vitamins-Minerals (MULTIVITAMIN ADULT PO)  Self Yes No   Sig: Take 1 tablet by mouth daily   acetaminophen (TYLENOL) 500 mg tablet  Self Yes No   Sig: Take by mouth    amLODIPine (NORVASC) 5 mg tablet  Self Yes No   fluticasone (FLONASE) 50 mcg/act nasal spray   No No   Si sprays into each nostril daily   metFORMIN (GLUCOPHAGE-XR) 500 mg 24 hr tablet   No No   Sig: Take 3 tablets (1,500 mg total) by mouth daily with breakfast   oxybutynin (DITROPAN-XL) 10 MG 24 hr tablet  Self Yes No   Sig: Take 10 mg by mouth daily at bedtime   simvastatin (ZOCOR) 20 mg tablet   No No   Sig: Take 1 tablet (20 mg total) by mouth daily   warfarin (COUMADIN) 7 5 mg tablet  Self Yes No   Sig: Take 7 5 mg by mouth daily       Facility-Administered Medications: None       Past Medical History:   Diagnosis Date    Chronic kidney disease     Colon polyp     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)        Past Surgical History:   Procedure Laterality Date    ANAL MANOMETRY  10/06/2015    CARDIAC PACEMAKER PLACEMENT      CATARACT EXTRACTION      COLONOSCOPY      CYSTOSTOMY      EGD AND COLONOSCOPY      REPLACEMENT TOTAL KNEE         Family History   Problem Relation Age of Onset    Heart disease Mother     Heart disease Father      I have reviewed and agree with the history as documented  E-Cigarette/Vaping    E-Cigarette Use Never User      E-Cigarette/Vaping Substances    Nicotine No     THC No     CBD No     Flavoring No     Other No     Unknown No      Social History     Tobacco Use    Smoking status: Former Smoker    Smokeless tobacco: Never Used   Vaping Use    Vaping Use: Never used   Substance Use Topics    Alcohol use: No    Drug use: No       Review of Systems   Constitutional: Negative for fever  HENT: Negative for congestion  Eyes: Negative for pain and redness  Respiratory: Negative for cough and shortness of breath  Cardiovascular: Negative for chest pain  Gastrointestinal: Negative for abdominal pain and vomiting  Swelling tenderness right foot    Physical Exam  Physical Exam  Vitals and nursing note reviewed  Constitutional:       Appearance: He is well-developed  HENT:      Head: Normocephalic  Right Ear: External ear normal       Left Ear: External ear normal       Nose: Nose normal    Eyes:      General: Lids are normal       Pupils: Pupils are equal, round, and reactive to light  Pulmonary:      Effort: Pulmonary effort is normal  No respiratory distress  Musculoskeletal:         General: No deformity  Normal range of motion  Cervical back: Normal range of motion and neck supple  Comments: Ecchymosis over the dorsum of the right foot, mild diffuse tenderness, distal neurovascular tendon intact  No right ankle tenderness no right knee tenderness  , good capillary refill  Skin:     General: Skin is warm and dry  Neurological:      Mental Status: He is alert and oriented to person, place, and time  Vital Signs  ED Triage Vitals [08/03/21 1235]   Temperature Pulse Respirations Blood Pressure SpO2   97 8 °F (36 6 °C) 80 18 (!) 172/81 97 %      Temp Source Heart Rate Source Patient Position - Orthostatic VS BP Location FiO2 (%)   Oral Monitor Sitting Left arm --      Pain Score       3           Vitals:    08/03/21 1235   BP: (!) 172/81   Pulse: 80   Patient Position - Orthostatic VS: Sitting         Visual Acuity      ED Medications  Medications - No data to display    Diagnostic Studies  Results Reviewed     None                 XR foot 3+ views RIGHT    (Results Pending)              Procedures  Procedures         ED Course          x-ray the right foot showed no fracture no dislocation no bony lesions, interpreted by me I was initial                                   MDM medical decision making 68-year-old male status post fall injuring his right foot at home, complains of pain across the metatarsals of his right foot, no fracture seen discussed outpatient treatment follow-up discussed indications to return  Disposition  Final diagnoses:   Contusion of right foot, initial encounter     Time reflects when diagnosis was documented in both MDM as applicable and the Disposition within this note     Time User Action Codes Description Comment    8/3/2021  1:03 PM Ruy Sari Add [S90 31XA] Contusion of right foot, initial encounter       ED Disposition     ED Disposition Condition Date/Time Comment    Discharge Stable Tue Aug 3, 2021  1:03 PM Ada Connolly discharge to home/self care              Follow-up Information     Follow up With Specialties Details [address], DO Sports Medicine Call   [address] [facility]  [address]  [place] [address]  [phone]            Patient's Medications   Discharge Prescriptions    No medications on file     No discharge procedures on file     PDMP Review     None          ED Provider  Electronically Signed by           Blayne Lopez MD  08/03/21 1727

## 2021-10-16 ENCOUNTER — CLINICAL SUPPORT (OUTPATIENT)
Dept: INTERNAL MEDICINE CLINIC | Facility: CLINIC | Age: 81
End: 2021-10-16
Payer: MEDICARE

## 2021-10-16 DIAGNOSIS — Z23 ENCOUNTER FOR IMMUNIZATION: Primary | ICD-10-CM

## 2021-10-16 PROCEDURE — G0008 ADMIN INFLUENZA VIRUS VAC: HCPCS

## 2021-10-16 PROCEDURE — 90662 IIV NO PRSV INCREASED AG IM: CPT

## 2021-11-05 ENCOUNTER — TELEPHONE (OUTPATIENT)
Dept: NEPHROLOGY | Facility: CLINIC | Age: 81
End: 2021-11-05

## 2021-12-11 ENCOUNTER — OFFICE VISIT (OUTPATIENT)
Dept: INTERNAL MEDICINE CLINIC | Facility: CLINIC | Age: 81
End: 2021-12-11
Payer: MEDICARE

## 2021-12-11 VITALS
TEMPERATURE: 98.9 F | DIASTOLIC BLOOD PRESSURE: 78 MMHG | HEIGHT: 74 IN | HEART RATE: 59 BPM | OXYGEN SATURATION: 99 % | SYSTOLIC BLOOD PRESSURE: 130 MMHG | WEIGHT: 189 LBS | BODY MASS INDEX: 24.26 KG/M2

## 2021-12-11 DIAGNOSIS — Z12.5 PROSTATE CANCER SCREENING: ICD-10-CM

## 2021-12-11 DIAGNOSIS — N18.31 STAGE 3A CHRONIC KIDNEY DISEASE (HCC): ICD-10-CM

## 2021-12-11 DIAGNOSIS — E11.59 HYPERTENSION ASSOCIATED WITH DIABETES (HCC): ICD-10-CM

## 2021-12-11 DIAGNOSIS — E11.9 TYPE 2 DIABETES MELLITUS WITHOUT COMPLICATION, WITHOUT LONG-TERM CURRENT USE OF INSULIN (HCC): ICD-10-CM

## 2021-12-11 DIAGNOSIS — I15.2 HYPERTENSION ASSOCIATED WITH DIABETES (HCC): ICD-10-CM

## 2021-12-11 DIAGNOSIS — Z11.59 NEED FOR HEPATITIS C SCREENING TEST: ICD-10-CM

## 2021-12-11 PROCEDURE — 99213 OFFICE O/P EST LOW 20 MIN: CPT | Performed by: INTERNAL MEDICINE

## 2021-12-11 PROCEDURE — 1123F ACP DISCUSS/DSCN MKR DOCD: CPT | Performed by: INTERNAL MEDICINE

## 2022-01-19 ENCOUNTER — TELEPHONE (OUTPATIENT)
Dept: INTERNAL MEDICINE CLINIC | Facility: CLINIC | Age: 82
End: 2022-01-19

## 2022-01-19 NOTE — TELEPHONE ENCOUNTER
Mild cough, brings up phlegm  No fever    1/11 tested positive  [facility]    Feels tired and sneezes    Should he make an appt to be seen?

## 2022-01-20 ENCOUNTER — TELEMEDICINE (OUTPATIENT)
Dept: INTERNAL MEDICINE CLINIC | Facility: CLINIC | Age: 82
End: 2022-01-20
Payer: MEDICARE

## 2022-01-20 DIAGNOSIS — U07.1 COVID: Primary | ICD-10-CM

## 2022-01-20 PROCEDURE — 99213 OFFICE O/P EST LOW 20 MIN: CPT | Performed by: INTERNAL MEDICINE

## 2022-01-20 RX ORDER — PREDNISONE 1 MG/1
TABLET ORAL
Qty: 30 TABLET | Refills: 1 | Status: SHIPPED | OUTPATIENT
Start: 2022-01-20

## 2022-01-20 NOTE — PATIENT INSTRUCTIONS
Symptomatic COVID with recovery anticipated  Treat the symptom with a taper of prednisone and a fairly low dose

## 2022-01-20 NOTE — PROGRESS NOTES
Virtual Regular Visit    Verification of patient location:    Patient is located in the following state in which I hold an active license [place]      Assessment/Plan:    Problem List Items Addressed This Visit        Other    COVID - Primary    Relevant Medications    predniSONE 5 mg tablet               Reason for visit is   Chief Complaint   Patient presents with    Virtual Regular Visit        Encounter provider Graciela Escobedo MD    Provider located at [address] [place] [id number]      Recent Visits  Date Type Provider Dept   01/19/22 Telephone Sharla Cullen Gregg [facility] recent visits within past 7 days and meeting all other requirements  Today's Visits  Date Type Provider Dept   01/20/22 Telemedicine Graciela Escobedo  [facility] today's visits and meeting all other requirements  Future Appointments  No visits were found meeting these conditions  Showing future appointments within next 150 days and meeting all other requirements       The patient was identified by name and date of birth  Marcia Carlton was informed that this is a telemedicine visit and that the visit is being conducted through [facility] and patient was informed this is a secure, HIPAA-complaint platform  He agrees to proceed     My office door was closed  No one else was in the room  He acknowledged consent and understanding of privacy and security of the video platform  The patient has agreed to participate and understands they can discontinue the visit at any time  Patient is aware this is a billable service  Subjective  Marcia Carlton is a 80 y o  male    Coronavirus infection and a completely vaccinated and boost 62-year-old  He has had this for about 2 weeks  He developed symptoms and was tested positive on the 10th after 5 days  He has had no life-threatening symptoms    He has not been short of breath and not been wheezing  He has some minimal muscle aches but he has a better  Right now it annoys him is a barky minimally productive cough  Past Medical History:   Diagnosis Date    Chronic kidney disease     Colon polyp     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)     Diabetes mellitus (HCC)     Diverticulosis     History of cardiac cath     Kidney stone     SSS (sick sinus syndrome) (HCC)        Past Surgical History:   Procedure Laterality Date    ANAL MANOMETRY  10/06/2015    CARDIAC PACEMAKER PLACEMENT      CATARACT EXTRACTION      COLONOSCOPY      CYSTOSTOMY      EGD AND COLONOSCOPY      REPLACEMENT TOTAL KNEE         Current Outpatient Medications   Medication Sig Dispense Refill    acetaminophen (TYLENOL) 500 mg tablet Take by mouth       amLODIPine (NORVASC) 5 mg tablet       Cholecalciferol 25 MCG (1000 UT) tablet Take 1,000 Units by mouth daily      fluticasone (FLONASE) 50 mcg/act nasal spray 2 sprays into each nostril daily (Patient not taking: Reported on 12/11/2021 ) 16 g 0    metFORMIN (GLUCOPHAGE-XR) 500 mg 24 hr tablet Take 3 tablets (1,500 mg total) by mouth daily with breakfast 270 tablet 3    Multiple Vitamins-Minerals (MULTIVITAMIN ADULT PO) Take 1 tablet by mouth daily      oxybutynin (DITROPAN-XL) 10 MG 24 hr tablet Take 10 mg by mouth daily at bedtime      predniSONE 5 mg tablet 4 tablets daily for 3 days, 3 tablets daily for 3 days, 2 tablets daily for 3 days, 1 tablet daily for 3 days 30 tablet 1    simvastatin (ZOCOR) 20 mg tablet Take 1 tablet (20 mg total) by mouth daily 90 tablet 3    warfarin (COUMADIN) 7 5 mg tablet Take 7 5 mg by mouth daily        No current facility-administered medications for this visit  No Known Allergies    Review of Systems   HENT: Positive for congestion  Respiratory: Positive for cough  All other systems reviewed and are negative        Video Exam    There were no vitals filed for this visit  Physical Exam  Constitutional:       Appearance: Normal appearance  Comments:   A male patient who appears to be the stated age verbalizing complaint of cough but not coughing  No tachypnea, no dyspnea and no respiratory distress  Not using accessory muscles  Speaking in full sentences  Pulmonary:      Effort: Pulmonary effort is normal    Neurological:      General: No focal deficit present  Mental Status: He is alert  Psychiatric:         Mood and Affect: Mood normal           I spent   10 minutes directly with the patient during this visit    VIRTUAL VISIT Juaquin Sanchez verbally agrees to participate in [employer]  Pt is aware that [employer] could be limited without vital signs or the ability to perform a full hands-on physical exam  Zaheer Bhatia understands he or the provider may request at any time to terminate the video visit and request the patient to seek care or treatment in person

## 2022-02-21 ENCOUNTER — APPOINTMENT (OUTPATIENT)
Dept: LAB | Facility: HOSPITAL | Age: 82
End: 2022-02-21
Payer: MEDICARE

## 2022-02-21 ENCOUNTER — TELEPHONE (OUTPATIENT)
Dept: INTERNAL MEDICINE CLINIC | Facility: CLINIC | Age: 82
End: 2022-02-21

## 2022-02-21 DIAGNOSIS — N18.9 CHRONIC KIDNEY DISEASE-MINERAL AND BONE DISORDER: ICD-10-CM

## 2022-02-21 DIAGNOSIS — N18.2 STAGE 2 CHRONIC KIDNEY DISEASE: ICD-10-CM

## 2022-02-21 DIAGNOSIS — M89.9 CHRONIC KIDNEY DISEASE-MINERAL AND BONE DISORDER: ICD-10-CM

## 2022-02-21 DIAGNOSIS — E83.9 CHRONIC KIDNEY DISEASE-MINERAL AND BONE DISORDER: ICD-10-CM

## 2022-02-21 DIAGNOSIS — Z11.59 NEED FOR HEPATITIS C SCREENING TEST: ICD-10-CM

## 2022-02-21 DIAGNOSIS — E11.9 TYPE 2 DIABETES MELLITUS WITHOUT COMPLICATION, WITHOUT LONG-TERM CURRENT USE OF INSULIN (HCC): ICD-10-CM

## 2022-02-21 LAB
25(OH)D3 SERPL-MCNC: 53 NG/ML (ref 30–100)
ANION GAP SERPL CALCULATED.3IONS-SCNC: 5 MMOL/L (ref 4–13)
BACTERIA UR QL AUTO: ABNORMAL /HPF
BASOPHILS # BLD AUTO: 0.04 THOUSANDS/ΜL (ref 0–0.1)
BASOPHILS NFR BLD AUTO: 1 % (ref 0–1)
BILIRUB UR QL STRIP: NEGATIVE
BUN SERPL-MCNC: 14 MG/DL (ref 5–25)
CALCIUM SERPL-MCNC: 9 MG/DL (ref 8.3–10.1)
CHLORIDE SERPL-SCNC: 105 MMOL/L (ref 100–108)
CLARITY UR: CLEAR
CO2 SERPL-SCNC: 30 MMOL/L (ref 21–32)
COLOR UR: YELLOW
CREAT SERPL-MCNC: 1.14 MG/DL (ref 0.6–1.3)
CREAT UR-MCNC: 108 MG/DL
CREAT UR-MCNC: 108 MG/DL
EOSINOPHIL # BLD AUTO: 0.08 THOUSAND/ΜL (ref 0–0.61)
EOSINOPHIL NFR BLD AUTO: 2 % (ref 0–6)
ERYTHROCYTE [DISTWIDTH] IN BLOOD BY AUTOMATED COUNT: 14.2 % (ref 11.6–15.1)
EST. AVERAGE GLUCOSE BLD GHB EST-MCNC: 209 MG/DL
GFR SERPL CREATININE-BSD FRML MDRD: 60 ML/MIN/1.73SQ M
GLUCOSE P FAST SERPL-MCNC: 166 MG/DL (ref 65–99)
GLUCOSE UR STRIP-MCNC: ABNORMAL MG/DL
HBA1C MFR BLD: 8.9 %
HCT VFR BLD AUTO: 43.8 % (ref 36.5–49.3)
HCV AB SER QL: NORMAL
HGB BLD-MCNC: 13.7 G/DL (ref 12–17)
HGB UR QL STRIP.AUTO: ABNORMAL
HYALINE CASTS #/AREA URNS LPF: ABNORMAL /LPF
IMM GRANULOCYTES # BLD AUTO: 0.01 THOUSAND/UL (ref 0–0.2)
IMM GRANULOCYTES NFR BLD AUTO: 0 % (ref 0–2)
KETONES UR STRIP-MCNC: NEGATIVE MG/DL
LEUKOCYTE ESTERASE UR QL STRIP: NEGATIVE
LYMPHOCYTES # BLD AUTO: 1.7 THOUSANDS/ΜL (ref 0.6–4.47)
LYMPHOCYTES NFR BLD AUTO: 35 % (ref 14–44)
MCH RBC QN AUTO: 26 PG (ref 26.8–34.3)
MCHC RBC AUTO-ENTMCNC: 31.3 G/DL (ref 31.4–37.4)
MCV RBC AUTO: 83 FL (ref 82–98)
MICROALBUMIN UR-MCNC: 67.8 MG/L (ref 0–20)
MICROALBUMIN/CREAT 24H UR: 63 MG/G CREATININE (ref 0–30)
MONOCYTES # BLD AUTO: 0.33 THOUSAND/ΜL (ref 0.17–1.22)
MONOCYTES NFR BLD AUTO: 7 % (ref 4–12)
NEUTROPHILS # BLD AUTO: 2.76 THOUSANDS/ΜL (ref 1.85–7.62)
NEUTS SEG NFR BLD AUTO: 55 % (ref 43–75)
NITRITE UR QL STRIP: NEGATIVE
NON-SQ EPI CELLS URNS QL MICRO: ABNORMAL /HPF
NRBC BLD AUTO-RTO: 0 /100 WBCS
PH UR STRIP.AUTO: 5.5 [PH]
PHOSPHATE SERPL-MCNC: 2.5 MG/DL (ref 2.3–4.1)
PLATELET # BLD AUTO: 312 THOUSANDS/UL (ref 149–390)
PMV BLD AUTO: 9.9 FL (ref 8.9–12.7)
POTASSIUM SERPL-SCNC: 4.3 MMOL/L (ref 3.5–5.3)
PROT UR STRIP-MCNC: NEGATIVE MG/DL
PROT UR-MCNC: 26 MG/DL
PROT/CREAT UR: 0.24 MG/G{CREAT} (ref 0–0.1)
PTH-INTACT SERPL-MCNC: 40.3 PG/ML (ref 18.4–80.1)
RBC # BLD AUTO: 5.26 MILLION/UL (ref 3.88–5.62)
RBC #/AREA URNS AUTO: ABNORMAL /HPF
SODIUM SERPL-SCNC: 140 MMOL/L (ref 136–145)
SP GR UR STRIP.AUTO: 1.02 (ref 1–1.03)
URATE CRY URNS QL MICRO: ABNORMAL /HPF
UROBILINOGEN UR QL STRIP.AUTO: 0.2 E.U./DL
WBC # BLD AUTO: 4.92 THOUSAND/UL (ref 4.31–10.16)
WBC #/AREA URNS AUTO: ABNORMAL /HPF

## 2022-02-21 PROCEDURE — 86803 HEPATITIS C AB TEST: CPT

## 2022-02-21 PROCEDURE — 82570 ASSAY OF URINE CREATININE: CPT

## 2022-02-21 PROCEDURE — 36415 COLL VENOUS BLD VENIPUNCTURE: CPT

## 2022-02-21 PROCEDURE — 84100 ASSAY OF PHOSPHORUS: CPT

## 2022-02-21 PROCEDURE — 85025 COMPLETE CBC W/AUTO DIFF WBC: CPT

## 2022-02-21 PROCEDURE — 84156 ASSAY OF PROTEIN URINE: CPT

## 2022-02-21 PROCEDURE — 82043 UR ALBUMIN QUANTITATIVE: CPT

## 2022-02-21 PROCEDURE — 80048 BASIC METABOLIC PNL TOTAL CA: CPT

## 2022-02-21 PROCEDURE — 83970 ASSAY OF PARATHORMONE: CPT

## 2022-02-21 PROCEDURE — 83036 HEMOGLOBIN GLYCOSYLATED A1C: CPT

## 2022-02-21 PROCEDURE — 82306 VITAMIN D 25 HYDROXY: CPT

## 2022-02-21 PROCEDURE — 81001 URINALYSIS AUTO W/SCOPE: CPT

## 2022-02-21 NOTE — TELEPHONE ENCOUNTER
----- Message from Devon Salas MD sent at 2/21/2022  1:15 PM EST -----   Hemoglobin A1c today shows 8 9 with average blood sugar of 200; far too high  Contnue  Metformin  Begin glimepiride 2 mg daily  Take it in the morning once a day

## 2022-02-21 NOTE — TELEPHONE ENCOUNTER
FYI: Dr Concepcion Richards     Pt's wife is reaching out to Dr Abdifatah Steen; with [facility], endocronologist about the high A1C    Pt was on prednisone when he had a lipscomb with COVID  This medication makes his blood sugar out of wack and high    Don't know how long it takes to get out of his system

## 2022-02-25 ENCOUNTER — TELEPHONE (OUTPATIENT)
Dept: NEPHROLOGY | Facility: CLINIC | Age: 82
End: 2022-02-25

## 2022-02-25 NOTE — TELEPHONE ENCOUNTER
I called and left a message on wife Portfol[employer] machine for patient confirming appointment   Quyen Kumar,

## 2022-02-28 ENCOUNTER — APPOINTMENT (OUTPATIENT)
Dept: LAB | Facility: HOSPITAL | Age: 82
End: 2022-02-28
Payer: MEDICARE

## 2022-02-28 ENCOUNTER — OFFICE VISIT (OUTPATIENT)
Dept: NEPHROLOGY | Facility: CLINIC | Age: 82
End: 2022-02-28
Payer: MEDICARE

## 2022-02-28 VITALS
BODY MASS INDEX: 26.28 KG/M2 | OXYGEN SATURATION: 99 % | SYSTOLIC BLOOD PRESSURE: 130 MMHG | HEART RATE: 66 BPM | TEMPERATURE: 97 F | RESPIRATION RATE: 16 BRPM | HEIGHT: 72 IN | WEIGHT: 194 LBS | DIASTOLIC BLOOD PRESSURE: 80 MMHG

## 2022-02-28 DIAGNOSIS — I48.21 PERMANENT ATRIAL FIBRILLATION (HCC): ICD-10-CM

## 2022-02-28 DIAGNOSIS — E11.9 TYPE 2 DIABETES MELLITUS WITHOUT COMPLICATION, WITHOUT LONG-TERM CURRENT USE OF INSULIN (HCC): ICD-10-CM

## 2022-02-28 DIAGNOSIS — N18.31 STAGE 3A CHRONIC KIDNEY DISEASE (HCC): Primary | ICD-10-CM

## 2022-02-28 DIAGNOSIS — N18.9 CHRONIC KIDNEY DISEASE-MINERAL AND BONE DISORDER: ICD-10-CM

## 2022-02-28 DIAGNOSIS — I15.2 HYPERTENSION ASSOCIATED WITH DIABETES (HCC): ICD-10-CM

## 2022-02-28 DIAGNOSIS — E83.9 CHRONIC KIDNEY DISEASE-MINERAL AND BONE DISORDER: ICD-10-CM

## 2022-02-28 DIAGNOSIS — E11.59 HYPERTENSION ASSOCIATED WITH DIABETES (HCC): ICD-10-CM

## 2022-02-28 DIAGNOSIS — M89.9 CHRONIC KIDNEY DISEASE-MINERAL AND BONE DISORDER: ICD-10-CM

## 2022-02-28 DIAGNOSIS — I48.20 CHRONIC ATRIAL FIBRILLATION (HCC): ICD-10-CM

## 2022-02-28 LAB
INR PPP: 2.19 (ref 0.84–1.19)
PROTHROMBIN TIME: 23.3 SECONDS (ref 11.6–14.5)

## 2022-02-28 PROCEDURE — 99214 OFFICE O/P EST MOD 30 MIN: CPT | Performed by: INTERNAL MEDICINE

## 2022-02-28 PROCEDURE — 36415 COLL VENOUS BLD VENIPUNCTURE: CPT

## 2022-02-28 PROCEDURE — 85610 PROTHROMBIN TIME: CPT

## 2022-02-28 NOTE — ASSESSMENT & PLAN NOTE
Lab Results   Component Value Date    EGFR 60 02/21/2022    EGFR 54 06/03/2021    EGFR 54 02/03/2021    CREATININE 1 14 02/21/2022    CREATININE 1 42 (H) 06/03/2021    CREATININE 1 26 02/03/2021   Patient kidney function remained stable    Asymptomatic and progressive nature of kidney disease discussed with the patient hydration and avoid any nephrotoxic medication

## 2022-02-28 NOTE — ASSESSMENT & PLAN NOTE
Lab Results   Component Value Date    EGFR 60 02/21/2022    EGFR 54 06/03/2021    EGFR 54 02/03/2021    CREATININE 1 14 02/21/2022    CREATININE 1 42 (H) 06/03/2021    CREATININE 1 26 02/03/2021   PTH and phosphorus along with vitamin-D are within acceptable range and will continue to monitor

## 2022-02-28 NOTE — PROGRESS NOTES
NEPHROLOGY OFFICE FOLLOW UP  Jose Sanuders [de-identified] y o  male MRN: [id number]    Encounter: [id number] 2/28/2022    REASON FOR VISIT: Jose Saunders is a [de-identified] y o  male who is here on 2/28/2022 for Chronic Kidney Disease and Follow-up    HPI:    Clearance came in today for follow-up of CKD stage 2 to 3  Etiology gentleman with history of hypertension and diabetes also has CKD  Since I saw him last he was infected with COVID though he stayed home  He was given prednisone as part of the treatment    He is feeling much better     Denies any new complaint     No chest pain no palpitation or shortness of breath     No nausea no vomiting      REVIEW OF SYSTEMS:    Review of Systems   Constitutional: Negative for activity change and fatigue  HENT: Negative for congestion and ear discharge  Eyes: Negative for photophobia and pain  Respiratory: Negative for apnea and choking  Cardiovascular: Negative for chest pain and palpitations  Gastrointestinal: Negative for abdominal distention and blood in stool  Endocrine: Negative for heat intolerance and polyphagia  Genitourinary: Negative for flank pain and urgency  Musculoskeletal: Negative for neck pain and neck stiffness  Skin: Negative for color change and wound  Allergic/Immunologic: Negative for food allergies and immunocompromised state  Neurological: Negative for seizures and facial asymmetry  Hematological: Negative for adenopathy  Does not bruise/bleed easily  Psychiatric/Behavioral: Negative for self-injury and suicidal ideas           PAST MEDICAL HISTORY:  Past Medical History:   Diagnosis Date    Chronic kidney disease     Colon polyp     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)     Diabetes mellitus ([facility] )     Diverticulosis     History of cardiac cath     Kidney stone     SSS (sick sinus syndrome) ([facility] )        PAST SURGICAL HISTORY:  Past Surgical History:   Procedure Laterality Date    ANAL MANOMETRY  10/06/2015  CARDIAC PACEMAKER PLACEMENT      CATARACT EXTRACTION      COLONOSCOPY      CYSTOSTOMY      EGD AND COLONOSCOPY      REPLACEMENT TOTAL KNEE         SOCIAL HISTORY:  Social History     Substance and Sexual Activity   Alcohol Use No     Social History     Substance and Sexual Activity   Drug Use No     Social History     Tobacco Use   Smoking Status Former Smoker   Smokeless Tobacco Never Used       FAMILY HISTORY:  Family History   Problem Relation Age of Onset    Heart disease Mother     Heart disease Father        MEDICATIONS:    Current Outpatient Medications:     acetaminophen (TYLENOL) 500 mg tablet, Take by mouth , Disp: , Rfl:     amLODIPine (NORVASC) 5 mg tablet, , Disp: , Rfl:     Cholecalciferol 25 MCG (1000 UT) tablet, Take 1,000 Units by mouth daily, Disp: , Rfl:     metFORMIN (GLUCOPHAGE-XR) 500 mg 24 hr tablet, Take 3 tablets (1,500 mg total) by mouth daily with breakfast (Patient taking differently: Take 2,000 mg by mouth 2 (two) times a day 2 tabs 2 times daily ), Disp: 270 tablet, Rfl: 3    Multiple Vitamins-Minerals (MULTIVITAMIN ADULT PO), Take 1 tablet by mouth daily, Disp: , Rfl:     oxybutynin (DITROPAN-XL) 10 MG 24 hr tablet, Take 10 mg by mouth daily at bedtime, Disp: , Rfl:     simvastatin (ZOCOR) 20 mg tablet, Take 1 tablet (20 mg total) by mouth daily, Disp: 90 tablet, Rfl: 3    warfarin (COUMADIN) 7 5 mg tablet, Take 7 5 mg by mouth daily  , Disp: , Rfl:     fluticasone (FLONASE) 50 mcg/act nasal spray, 2 sprays into each nostril daily (Patient not taking: Reported on 12/11/2021 ), Disp: 16 g, Rfl: 0    glimepiride (AMARYL) 2 mg tablet, Take 1 tablet (2 mg total) by mouth daily with breakfast, Disp: 30 tablet, Rfl: 5    predniSONE 5 mg tablet, 4 tablets daily for 3 days, 3 tablets daily for 3 days, 2 tablets daily for 3 days, 1 tablet daily for 3 days, Disp: 30 tablet, Rfl: 1    PHYSICAL EXAM:  Vitals:    02/28/22 0910   BP: 130/80   BP Location: Right arm   Patient Position: Sitting   Pulse: 66   Resp: 16   Temp: (!) 97 °F (36 1 °C)   TempSrc: Temporal   SpO2: 99%   Weight: 88 kg (194 lb)   Height: 6' (1 829 m)     Body mass index is 26 31 kg/m²  Physical Exam  Constitutional:       General: He is not in acute distress  Appearance: Normal appearance  He is well-developed  HENT:      Head: Normocephalic and atraumatic  Eyes:      General: No scleral icterus  Conjunctiva/sclera: Conjunctivae normal    Neck:      Vascular: No JVD  Cardiovascular:      Rate and Rhythm: Normal rate and regular rhythm  Heart sounds: Normal heart sounds  Pulmonary:      Effort: Pulmonary effort is normal       Breath sounds: Normal breath sounds  No wheezing  Abdominal:      Palpations: Abdomen is soft  Tenderness: There is no abdominal tenderness  Musculoskeletal:         General: No swelling  Normal range of motion  Cervical back: Normal range of motion and neck supple  Skin:     General: Skin is warm  Findings: No rash  Neurological:      Mental Status: He is alert and oriented to person, place, and time     Psychiatric:         Behavior: Behavior normal          LAB RESULTS:  Results for orders placed or performed in visit on 81/55/89   Basic metabolic panel   Result Value Ref Range    Sodium 140 136 - 145 mmol/L    Potassium 4 3 3 5 - 5 3 mmol/L    Chloride 105 100 - 108 mmol/L    CO2 30 21 - 32 mmol/L    ANION GAP 5 4 - 13 mmol/L    BUN 14 5 - 25 mg/dL    Creatinine 1 14 0 60 - 1 30 mg/dL    Glucose, Fasting 166 (H) 65 - 99 mg/dL    Calcium 9 0 8 3 - 10 1 mg/dL    eGFR 60 ml/min/1 73sq m   CBC and differential   Result Value Ref Range    WBC 4 92 4 31 - 10 16 Thousand/uL    RBC 5 26 3 88 - 5 62 Million/uL    Hemoglobin 13 7 12 0 - 17 0 g/dL    Hematocrit 43 8 36 5 - 49 3 %    MCV 83 82 - 98 fL    MCH 26 0 (L) 26 8 - 34 3 pg    MCHC 31 3 (L) 31 4 - 37 4 g/dL    RDW 14 2 11 6 - 15 1 %    MPV 9 9 8 9 - 12 7 fL    Platelets 926 743 - 570 Thousands/uL nRBC 0 /100 WBCs    Neutrophils Relative 55 43 - 75 %    Immat GRANS % 0 0 - 2 %    Lymphocytes Relative 35 14 - 44 %    Monocytes Relative 7 4 - 12 %    Eosinophils Relative 2 0 - 6 %    Basophils Relative 1 0 - 1 %    Neutrophils Absolute 2 76 1 85 - 7 62 Thousands/µL    Immature Grans Absolute 0 01 0 00 - 0 20 Thousand/uL    Lymphocytes Absolute 1 70 0 60 - 4 47 Thousands/µL    Monocytes Absolute 0 33 0 17 - 1 22 Thousand/µL    Eosinophils Absolute 0 08 0 00 - 0 61 Thousand/µL    Basophils Absolute 0 04 0 00 - 0 10 Thousands/µL   Phosphorus   Result Value Ref Range    Phosphorus 2 5 2 3 - 4 1 mg/dL   Protein / creatinine ratio, urine   Result Value Ref Range    Creatinine, Ur 108 0 mg/dL    Protein Urine Random 26 mg/dL    Prot/Creat Ratio, Ur 0 24 (H) 0 00 - 0 10   PTH, intact   Result Value Ref Range    PTH 40 3 18 4 - 80 1 pg/mL   UA (URINE) with reflex to Scope   Result Value Ref Range    Color, UA Yellow     Clarity, UA Clear     Specific Gravity, UA 1 025 1 003 - 1 030    pH, UA 5 5 4 5, 5 0, 5 5, 6 0, 6 5, 7 0, 7 5, 8 0    Leukocytes, UA Negative Negative    Nitrite, UA Negative Negative    Protein, UA Negative Negative mg/dl    Glucose,  (1/10%) (A) Negative mg/dl    Ketones, UA Negative Negative mg/dl    Urobilinogen, UA 0 2 0 2, 1 0 E U /dl E U /dl    Bilirubin, UA Negative Negative    Blood, UA Small (A) Negative   Vitamin D 25 hydroxy   Result Value Ref Range    Vit D, 25-Hydroxy 53 0 30 0 - 100 0 ng/mL   Hepatitis C Antibody ([facility])   Result Value Ref Range    Hepatitis C Ab Non-reactive Non-reactive   Hemoglobin A1C ([facility])   Result Value Ref Range    Hemoglobin A1C 8 9 (H) Normal 3 8-5 6%; PreDiabetic 5 7-6 4%; Diabetic >=6 5%; Glycemic control for adults with diabetes <7 0% %     mg/dl   Microalbumin / creatinine urine ratio ([facility])   Result Value Ref Range    Creatinine, Ur 108 0 mg/dL    Microalbum  ,U,Random 67 8 (H) 0 0 - 20 0 mg/L    Microalb Creat Ratio 63 (H) 0 - 30 mg/g creatinine   Urine Microscopic   Result Value Ref Range    RBC, UA 1-2 None Seen, 0-1, 1-2, 2-4, 0-5 /hpf    WBC, UA None Seen None Seen, 0-1, 1-2, 0-5, 2-4 /hpf    Epithelial Cells None Seen None Seen, Occasional /hpf    Bacteria, UA None Seen None Seen, Occasional /hpf    Hyaline Casts, UA 0-1 (A) (none) /lpf    Uric Acid Carmina, UA Moderate /hpf       ASSESSMENT and PLAN:      Stage 3a chronic kidney disease (HCC)  Lab Results   Component Value Date    EGFR 60 02/21/2022    EGFR 54 06/03/2021    EGFR 54 02/03/2021    CREATININE 1 14 02/21/2022    CREATININE 1 42 (H) 06/03/2021    CREATININE 1 26 02/03/2021   Patient kidney function remained stable  Asymptomatic and progressive nature of kidney disease discussed with the patient hydration and avoid any nephrotoxic medication    Chronic kidney disease-mineral and bone disorder  Lab Results   Component Value Date    EGFR 60 02/21/2022    EGFR 54 06/03/2021    EGFR 54 02/03/2021    CREATININE 1 14 02/21/2022    CREATININE 1 42 (H) 06/03/2021    CREATININE 1 26 02/03/2021   PTH and phosphorus along with vitamin-D are within acceptable range and will continue to monitor    Hypertension associated with diabetes ([facility] )    Lab Results   Component Value Date    HGBA1C 8 9 (H) 02/21/2022   Quite well control  Will continue to monitor    Type 2 diabetes mellitus without complication ([facility] )    Lab Results   Component Value Date    HGBA1C 8 9 (H) 02/21/2022   Not very well control  Metformin has been increased  He is being monitored by endocrinologist   Importance of diabetic control and effect on kidney disease discussed with him      Everything discussed the patient at length  I will see him back in 1 year again  Will get blood and urine test before that visit        Portions of the record may have been created with voice recognition software   Occasional wrong word or "sound a like" substitutions may have occurred due to the inherent limitations of voice recognition software  Read the chart carefully and recognize, using context, where substitutions have occurred  If you have any questions, please contact the dictating provider

## 2022-02-28 NOTE — ASSESSMENT & PLAN NOTE
Lab Results   Component Value Date    HGBA1C 8 9 (H) 02/21/2022   Not very well control  Metformin has been increased    He is being monitored by endocrinologist   Importance of diabetic control and effect on kidney disease discussed with him

## 2022-02-28 NOTE — PATIENT INSTRUCTIONS
Chronic Kidney Disease   AMBULATORY CARE:   Chronic kidney disease (CKD)  is the gradual and permanent loss of kidney function  Normally, the kidneys remove fluid, chemicals, and waste from your blood  These wastes are turned into urine by your kidneys  CKD may worsen over time and lead to kidney failure  Common signs and symptoms include the following:   · Changes in how often you need to urinate    · Swelling in your arms, legs, or feet    · Shortness of breath    · Fatigue or weakness    · Bad or bitter taste in your mouth    · Nausea, vomiting, or loss of appetite    Call your local emergency number (911 in the [address] [facility]) if:   · You have a seizure  · You have shortness of breath  Seek care immediately if:   · You are confused and very drowsy  Call your doctor or nephrologist if:   · You suddenly gain or lose more weight than your healthcare provider has told you is okay  · You have itchy skin or a rash  · You urinate more or less than you normally do  · You have blood in your urine  · You have nausea and are vomiting  · You have fatigue or muscle weakness  · You have hiccups that will not stop  · You have questions or concerns about your condition or care  How CKD is diagnosed:  CKD has 5 stages  Your healthcare provider will use results from the following tests to find the stage of CKD you have:  · Blood and urine tests  show how well your kidneys are working  They may also show the cause of your CKD  · Ultrasound, CT scan, or MRI  pictures may be used to check your kidneys  You may be given contrast liquid to help your kidneys show up better in the pictures  Tell the healthcare provider if you have ever had an allergic reaction to contrast liquid  Do not enter the MRI room with anything metal  Metal can cause serious injury  Tell the healthcare provider if you have any metal in or on your body      · A biopsy  is a procedure to remove a small piece of tissue from your kidney  It is done to find the cause of your CKD  Treatment  can help control signs and symptoms, and prevent a worse stage of CKD  Your care team may include specialists, such as a dietitian or a heart specialist  This depends on the stage of your CKD and if you have other health conditions to manage  Healthcare providers will work with you to create a plan based on your decisions for treatment  Your treatment plan may include any of the following:  · Medicines  may be given to decrease your blood pressure and get rid of extra fluid  You may also receive medicine to manage health conditions that may occur with CKD, such as anemia, diabetes, and heart disease  · Dialysis  is a treatment to remove chemicals and waste from your blood when your kidneys can no longer do this  · Surgery  may be needed to create an arteriovenous fistula (AVF) in your arm or insert a catheter into your abdomen  This is done so you can receive dialysis  · A kidney transplant  may be done if your CKD becomes severe  What you can do to manage CKD: Management may include making some lifestyle changes  Tell your healthcare provider if you have any concerns about being able to make changes  He or she can help you find solutions, including working with specialists  Ask for help creating a plan to break large goals into smaller steps  Your plan may include any of the following:  · Manage other health conditions  Your healthcare provider will work with you to make a care plan that meets your needs  You will be checked regularly for heart disease or other conditions that can make CKD worse, such as diabetes  Your blood pressure will be closely monitored  You will also get a target blood pressure and help making a plan to reach your target  This may include taking your blood pressure at home  · Maintain a healthy weight  Your weight and body mass index (BMI) will be checked regularly   BMI helps find if your weight is healthy for your height  Your healthcare provider will use other tests to check your muscle and protein levels  Extra weight can strain your kidneys  A low weight or low muscle mass can make you feel more tired  You may have trouble doing your daily activities  Ask your provider what a healthy weight is for you  He or she can help you create a plan to lose or gain weight safely, if needed  The plan may include keeping a food diary  This is a list of foods and liquids you have each day  Your provider will use the diary to help you make changes, if needed  Changes are based on your health and any other conditions you have, such as diabetes  · Create an exercise plan  Regular exercise can help you manage CKD, high blood pressure, and diabetes  Exercise also helps control weight  Your provider can help you create exercise goals and a plan to reach those goals  For example, your goal may be to exercise for 30 minutes in a day  Your plan can include breaking exercise into 10 minute sessions, 3 times during the day  · Create a healthy eating plan  Your provider may tell you to eat food low in potassium, phosphorus, or protein  Your provider may also recommend vitamin or mineral supplements  Do not take any supplements without talking to your provider  A dietitian can help you plan meals if needed  Ask how much liquid to drink each day and which liquids are best for you  · Limit sodium (salt) as directed  You may need to limit sodium to less than 2,300 milligrams (mg) each day  Ask your dietitian or healthcare provider how much sodium you can have each day  The amount depends on your stage of kidney disease  Table salt, canned foods, soups, salted snacks, and processed meats, like deli meats and sausage, are high in sodium  Your provider or a dietitian can show you how to read food labels for sodium  · Limit alcohol as directed  Alcohol can cause fluid retention and can affect your kidneys   Ask how much alcohol is safe for you  A drink of alcohol is 12 ounces of beer, 5 ounces of wine, or 1½ ounces of liquor  · Do not smoke  Nicotine and other chemicals in cigarettes and cigars can cause kidney damage  Ask your provider for information if you currently smoke and need help to quit  E-cigarettes or smokeless tobacco still contain nicotine  Talk to your provider before you use these products  · Ask about over-the-counter medicines  Medicines such as NSAIDs and laxatives may harm your kidneys  Some cough and cold medicines can raise your blood pressure  Always ask if a medicine is safe before you take it  · Ask about vaccines you may need  CKD can increase your risk for infections such as pneumonia, influenza, and hepatitis  Vaccines lower your risk for infection  Your healthcare provider will tell you which vaccines you need and when to get them  Follow up with your doctor or nephrologist as directed: You will need to return for tests to monitor your kidney and nerve function, and your parathyroid hormone level  Your medicines may be changed, based on certain test results  Write down your questions so you remember to ask them during your visits  © Copyright [employer] 2022 Information is for End User's use only and may not be sold, redistributed or otherwise used for commercial purposes  All illustrations and images included in CareNotes® are the copyrighted property of [employer]  or Lauri Warren  The above information is an  only  It is not intended as medical advice for individual conditions or treatments  Talk to your doctor, nurse or pharmacist before following any medical regimen to see if it is safe and effective for you

## 2022-02-28 NOTE — ASSESSMENT & PLAN NOTE
Lab Results   Component Value Date    HGBA1C 8 9 (H) 02/21/2022   Quite well control    Will continue to monitor

## 2022-04-16 ENCOUNTER — OFFICE VISIT (OUTPATIENT)
Dept: INTERNAL MEDICINE CLINIC | Facility: CLINIC | Age: 82
End: 2022-04-16
Payer: MEDICARE

## 2022-04-16 VITALS
BODY MASS INDEX: 25.6 KG/M2 | TEMPERATURE: 98.2 F | OXYGEN SATURATION: 98 % | HEART RATE: 61 BPM | WEIGHT: 189 LBS | DIASTOLIC BLOOD PRESSURE: 78 MMHG | RESPIRATION RATE: 18 BRPM | HEIGHT: 72 IN | SYSTOLIC BLOOD PRESSURE: 128 MMHG

## 2022-04-16 DIAGNOSIS — I15.2 HYPERTENSION ASSOCIATED WITH DIABETES (HCC): Primary | ICD-10-CM

## 2022-04-16 DIAGNOSIS — I48.20 CHRONIC ATRIAL FIBRILLATION (HCC): ICD-10-CM

## 2022-04-16 DIAGNOSIS — E11.9 TYPE 2 DIABETES MELLITUS WITHOUT COMPLICATION, WITHOUT LONG-TERM CURRENT USE OF INSULIN (HCC): ICD-10-CM

## 2022-04-16 DIAGNOSIS — E11.59 HYPERTENSION ASSOCIATED WITH DIABETES (HCC): Primary | ICD-10-CM

## 2022-04-16 PROCEDURE — G0438 PPPS, INITIAL VISIT: HCPCS | Performed by: INTERNAL MEDICINE

## 2022-04-16 PROCEDURE — 99213 OFFICE O/P EST LOW 20 MIN: CPT | Performed by: INTERNAL MEDICINE

## 2022-04-16 RX ORDER — METFORMIN HYDROCHLORIDE 500 MG/1
1000 TABLET, EXTENDED RELEASE ORAL 2 TIMES DAILY WITH MEALS
Qty: 200 TABLET | Refills: 1
Start: 2022-04-16

## 2022-04-16 NOTE — PROGRESS NOTES
Assessment/Plan:       Diagnoses and all orders for this visit:    Hypertension associated with diabetes ([address] )    Type 2 diabetes mellitus without complication, without long-term current use of insulin (HCC)  -     metFORMIN (GLUCOPHAGE-XR) 500 mg 24 hr tablet; Take 2 tablets (1,000 mg total) by mouth 2 (two) times a day with meals 2 tabs 2 times daily    Chronic atrial fibrillation (HCC)                Subjective:      Patient ID: Marbella Correia is a [de-identified] y o  male  An 82-year-old man  Diabetic, not well controlled at all  He follows with [facility] endocrinology  His last hemoglobin A1c done  was 8 9%  I had 1 in to add glimepiride 2 mg daily but he did not do so because he insists he was told that glimepiride is bad  Kidneys     He has chronic kidney disease stage 2 and follows with nephrology       No cigarettes, no alcohol, no illicit drugs    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[employer]   and lives with his wife  History of sick sinus syndrome  He has a pacemaker  This was placed in 2016  A complication of care was 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  He fibrillating so is on anticoagulation anyway  Right now on Coumadin because he cannot afford Eliquis  Globally feels well  Mom  from stroke, father  from heart related illness  Both a relatively young ages  colonoscopy in 2020 with wto small adenomatous appearing polyps  The patient had bilateral cataract surgery  He had kidney stones  He has diverticulosis  A recent E[facility] done at [facility] shows 100% pacing  The following portions of the patient's history were reviewed and updated as appropriate:   He has a past medical history of Chronic kidney disease, Colon polyp, 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 ([facility] [address] ), Diverticulosis, History of cardiac cath, Kidney stone, and SSS (sick sinus syndrome) ([facility] [address] )  ,  does not have any pertinent problems on file  ,   has a past surgical history that includes Replacement total knee; Cataract extraction; Colonoscopy; Cystostomy; EGD AND COLONOSCOPY; Anal manometry (10/06/2015); Cardiac pacemaker placement; and Tooth extraction (04/2022)  ,  family history includes Heart disease in his father and mother  ,   reports that he has quit smoking  He has never used smokeless tobacco  He reports that he does not drink alcohol and does not use drugs  ,  has No Known Allergies     Current Outpatient Medications   Medication Sig Dispense Refill    amLODIPine (NORVASC) 5 mg tablet       Cholecalciferol 25 MCG (1000 UT) tablet Take 1,000 Units by mouth daily      Multiple Vitamins-Minerals (MULTIVITAMIN ADULT PO) Take 1 tablet by mouth daily      oxybutynin (DITROPAN-XL) 10 MG 24 hr tablet Take 10 mg by mouth daily at bedtime      simvastatin (ZOCOR) 20 mg tablet Take 1 tablet (20 mg total) by mouth daily 90 tablet 3    warfarin (COUMADIN) 7 5 mg tablet Take 7 5 mg by mouth daily        metFORMIN (GLUCOPHAGE-XR) 500 mg 24 hr tablet Take 2 tablets (1,000 mg total) by mouth 2 (two) times a day with meals 2 tabs 2 times daily 200 tablet 1    predniSONE 5 mg tablet 4 tablets daily for 3 days, 3 tablets daily for 3 days, 2 tablets daily for 3 days, 1 tablet daily for 3 days 30 tablet 1     No current facility-administered medications for this visit  Review of Systems   Musculoskeletal: Positive for arthralgias  All other systems reviewed and are negative  Objective:  Vitals:    04/16/22 0916   BP: 128/78   Pulse: 61   Resp: 18   Temp: 98 2 °F (36 8 °C)   SpO2: 98%      Physical Exam  Constitutional:       Comments: A male patient who appears younger than stated age   Eyes:      General: Scleral icterus present  Cardiovascular:      Rate and Rhythm: Normal rate and regular rhythm  Pulmonary:      Effort: Pulmonary effort is normal       Breath sounds: Normal breath sounds  No wheezing or rales  Chest:      Chest wall: No tenderness  Musculoskeletal:         General: Normal range of motion  Neurological:      General: No focal deficit present  Mental Status: He is alert  Psychiatric:         Mood and Affect: Mood normal          Judgment: Judgment normal            Patient Instructions   The above problems who feels well  Hemoglobin A1c was 8 9; the patient states this coincided with being given a course of corticosteroids for COVID    Of wrap my recommendation is that he check his blood sugar twice a day 3 days a week, checking it fasting in the morning before breakfast and then 2 hours after the major meal     He continues to follow with endocrinology    See me back here again for his next birthday

## 2022-04-16 NOTE — PROGRESS NOTES
Assessment and Plan:     Problem List Items Addressed This Visit     None        BMI Counseling: Body mass index is 25 63 kg/m²  The BMI is above normal  Nutrition recommendations include decreasing portion sizes and moderation in carbohydrate intake  Rationale for BMI follow-up plan is due to patient being overweight or obese  BMI Counseling: Body mass index is 25 63 kg/m²  Follow-up plan was not completed due to elderly patient (74 years old) where weight reduction/weight gain would complicate underlying health condition such as: illness or physical disability  Depression Screening and Follow-up Plan: Patient was screened for depression during today's encounter  They screened negative with a PHQ-2 score of 1  Preventive health issues were discussed with patient, and age appropriate screening tests were ordered as noted in patient's After Visit Summary  Personalized health advice and appropriate referrals for health education or preventive services given if needed, as noted in patient's After Visit Summary       History of Present Illness:     Patient presents for Medicare Annual Wellness visit    Patient Care Team:  Cricket Crandall MD as PCP - General (Internal Medicine)  Ever Edwards MD as Consulting Physician (Nephrology)  Josey Mancia MD (Orthopedic Surgery)  Cricket Crandall MD (Internal Medicine)  Chavo Ortiz MD (Gastroenterology)     Problem List:     Patient Active Problem List   Diagnosis    Hypertension associated with diabetes ([address] )    Stage 2 chronic kidney disease    Type 2 diabetes mellitus without complication ([address] )    SSS (sick sinus syndrome) ([address] )    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 (HCC)    Pre-operative clearance    Chronic atrial fibrillation ([address] )    Chronic kidney disease-mineral and bone disorder    Hematochezia    Stage 3a chronic kidney disease ([address] )    COVID      Past Medical and Surgical History:     Past Medical History:   Diagnosis Date    Chronic kidney disease     Colon polyp     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)     Diabetes mellitus ([address] )     Diverticulosis     History of cardiac cath     Kidney stone     [facility]SS [facility]ick [facility]) [facility])      Past Surgical History:   Procedure Laterality Date    ANAL MANOMETRY  10/06/2015    CARDIAC PACEMAKER PLACEMENT      CATARACT EXTRACTION      COLONOSCOPY      CYSTOSTOMY      EGD AND COLONOSCOPY      REPLACEMENT TOTAL KNEE      TOOTH EXTRACTION  04/2022      Family History:     Family History   Problem Relation Age of Onset    Heart disease Mother     Heart disease Father       Social History:     Social History     Socioeconomic History    Marital status: /Civil Union     Spouse name: None    Number of children: None    Years of education: None    Highest education level: None   Occupational History    None   Tobacco Use    Smoking status: Former Smoker    Smokeless tobacco: Never Used   Vaping Use    Vaping Use: Never used   Substance and Sexual Activity    Alcohol use: No    Drug use: No    Sexual activity: Yes     Partners: Female   Other Topics Concern    None   Social History Narrative    None     Social Determinants of Health     Financial Resource Strain: Not on file   Food Insecurity: Not on file   Transportation Needs: Not on file   Physical Activity: Sufficiently Active    Days of Exercise per Week: 4 days   Contract [employer] of Exercise per Session: 40 min   Stress: No Stress Concern Present    Feeling of Stress : Not at all   Social Connections: Not on file   Intimate Partner Violence: Not on file   Housing Stability: Not on file      Medications and Allergies:     Current Outpatient Medications   Medication Sig Dispense Refill    amLODIPine (NORVASC) 5 mg tablet       Cholecalciferol 25 MCG (1000 UT) tablet Take 1,000 Units by mouth daily      Multiple Vitamins-Minerals (MULTIVITAMIN ADULT PO) Take 1 tablet by mouth daily      oxybutynin (DITROPAN-XL) 10 MG 24 hr tablet Take 10 mg by mouth daily at bedtime      simvastatin (ZOCOR) 20 mg tablet Take 1 tablet (20 mg total) by mouth daily 90 tablet 3    warfarin (COUMADIN) 7 5 mg tablet Take 7 5 mg by mouth daily        predniSONE 5 mg tablet 4 tablets daily for 3 days, 3 tablets daily for 3 days, 2 tablets daily for 3 days, 1 tablet daily for 3 days 30 tablet 1     No current facility-administered medications for this visit  No Known Allergies   Immunizations:     Immunization History   Administered Date(s) Administered    COVID-19 MODERNA VACC 0 5 ML IM 01/22/2021, 03/01/2021, 11/03/2021    INFLUENZA 09/01/2020    Influenza, high dose seasonal 0 7 mL 10/02/2020, 10/16/2021    Influenza, seasonal, injectable 10/13/2010    Pneumococcal Conjugate 13-Valent 09/27/2016    Pneumococcal Polysaccharide PPV23 01/01/2006    influenza, trivalent, adjuvanted 09/30/2019, 10/02/2020      Health Maintenance:         Topic Date Due    Hepatitis C Screening  Completed         Topic Date Due    DTaP,Tdap,and Td Vaccines (1 - Tdap) Never done    Pneumococcal Vaccine: 65+ Years (2 of 2 - PPSV23) 09/27/2021      Medicare Health Risk Assessment:     There were no vitals taken for this visit  Christal Walker is here for his Subsequent Wellness visit  Last Medicare Wellness visit information reviewed, patient interviewed and updates made to the record today  Health Risk Assessment:   Patient rates overall health as good  Patient feels that their physical health rating is same  Patient is satisfied with their life  Eyesight was rated as same  Hearing was rated as same  Patient feels that their emotional and mental health rating is same  Patients states they are sometimes angry  Patient states they are sometimes unusually tired/fatigued  Pain experienced in the last 7 days has been none  Patient states that he has experienced no weight loss or gain in last 6 months       Depression Screening:   PHQ-2 Score: 1      Fall Risk Screening: In the past year, patient has experienced: no history of falling in past year      Home Safety:  Patient does not have trouble with stairs inside or outside of their home  Patient has working smoke alarms and has working carbon monoxide detector  Home safety hazards include: none  Nutrition:   Current diet is Diabetic  Medications:   Patient is currently taking over-the-counter supplements  OTC medications include: see medication list  Patient is able to manage medications  Activities of Daily Living (ADLs)/Instrumental Activities of Daily Living (IADLs):   Walk and transfer into and out of bed and chair?: Yes  Dress and groom yourself?: Yes    Bathe or shower yourself?: Yes    Feed yourself? Yes  Do your laundry/housekeeping?: No  Manage your money, pay your bills and track your expenses?: No  Make your own meals?: No    Do your own shopping?: No    ADL comments: Patients wife helps with household chores    Previous Hospitalizations:   Any hospitalizations or ED visits within the last 12 months?: No      Advance Care Planning:   Living will: Yes    Advanced directive: Yes      Cognitive Screening:   Provider or family/friend/caregiver concerned regarding cognition?: No    PREVENTIVE SCREENINGS      Cardiovascular Screening:    General: Screening Current      Diabetes Screening:     General: Screening Not Indicated and History Diabetes      Prostate Cancer Screening:    General: Screening Not Indicated      Abdominal Aortic Aneurysm (AAA) Screening:    Risk factors include: tobacco use        General: Screening Not Indicated      Lung Cancer Screening:     General: Screening Not Indicated      Hepatitis C Screening:    General: Screening Current    Screening, Brief Intervention, and Referral to Treatment (SBIRT)    Screening  Typical number of drinks in a day: 0  Typical number of drinks in a week: 2  Interpretation: Low risk drinking behavior      AUDIT-C Screenin) How often did you have a drink containing alcohol in the past year? monthly or less  2) How many drinks did you have on a typical day when you were drinking in the past year?  1 to 2  3) How often did you have 6 or more drinks on one occasion in the past year? never    AUDIT-C Score: 1  Interpretation: Score 0-3 (male): Negative screen for alcohol misuse    Single Item Drug Screening:  How often have you used an illegal drug (including marijuana) or a prescription medication for non-medical reasons in the past year? never    Single Item Drug Screen Score: 0  Interpretation: Negative screen for possible drug use disorder      Temo Anderson MD

## 2022-04-16 NOTE — PATIENT INSTRUCTIONS
The above problems who feels well  Hemoglobin A1c was 8 9; the patient states this coincided with being given a course of corticosteroids for COVID    Of wrap my recommendation is that he check his blood sugar twice a day 3 days a week, checking it fasting in the morning before breakfast and then 2 hours after the major meal     He continues to follow with endocrinology    See me back here again for his next birthday

## 2022-04-16 NOTE — PROGRESS NOTES
Diabetic Foot Exam    Patient's shoes and socks removed  Right Foot/Ankle   Right Foot Inspection  Skin Exam: skin intact  Sensory   Monofilament testing: intact    Vascular  The right DP pulse is 1+  The right PT pulse is 0  Left Foot/Ankle  Left Foot Inspection  Skin Exam: skin intact  Sensory   Monofilament testing: intact    Vascular  The left DP pulse is 1+  The left PT pulse is 0       Assign Risk Category  No deformity present  No loss of protective sensation  Weak pulses  Risk: 0

## 2022-09-20 ENCOUNTER — CLINICAL SUPPORT (OUTPATIENT)
Dept: INTERNAL MEDICINE CLINIC | Facility: CLINIC | Age: 82
End: 2022-09-20
Payer: MEDICARE

## 2022-09-20 DIAGNOSIS — Z23 ENCOUNTER FOR IMMUNIZATION: Primary | ICD-10-CM

## 2022-09-20 PROCEDURE — G0008 ADMIN INFLUENZA VIRUS VAC: HCPCS

## 2022-09-20 PROCEDURE — 90662 IIV NO PRSV INCREASED AG IM: CPT

## 2022-11-17 ENCOUNTER — HOSPITAL ENCOUNTER (INPATIENT)
Facility: HOSPITAL | Age: 82
LOS: 1 days | Discharge: HOME | End: 2022-11-19
Attending: EMERGENCY MEDICINE | Admitting: INTERNAL MEDICINE

## 2022-11-17 ENCOUNTER — APPOINTMENT (EMERGENCY)
Dept: CT IMAGING | Facility: HOSPITAL | Age: 82
End: 2022-11-17

## 2022-11-17 ENCOUNTER — NURSE TRIAGE (OUTPATIENT)
Dept: OTHER | Facility: OTHER | Age: 82
End: 2022-11-17

## 2022-11-17 DIAGNOSIS — K92.1 HEMATOCHEZIA: Primary | ICD-10-CM

## 2022-11-17 DIAGNOSIS — K92.2 GI BLEED: Primary | ICD-10-CM

## 2022-11-17 DIAGNOSIS — K92.1 HEMATOCHEZIA: ICD-10-CM

## 2022-11-17 DIAGNOSIS — K62.89 NODULE OF ANUS: ICD-10-CM

## 2022-11-17 LAB
ABO GROUP BLD: NORMAL
ALBUMIN SERPL BCP-MCNC: 3.6 G/DL (ref 3.5–5)
ALP SERPL-CCNC: 64 U/L (ref 46–116)
ALT SERPL W P-5'-P-CCNC: 18 U/L (ref 12–78)
ANION GAP SERPL CALCULATED.3IONS-SCNC: 8 MMOL/L (ref 4–13)
APTT PPP: 43 SECONDS (ref 23–37)
AST SERPL W P-5'-P-CCNC: 13 U/L (ref 5–45)
BASOPHILS # BLD AUTO: 0.03 THOUSANDS/ÂΜL (ref 0–0.1)
BASOPHILS NFR BLD AUTO: 1 % (ref 0–1)
BILIRUB SERPL-MCNC: 0.26 MG/DL (ref 0.2–1)
BLD GP AB SCN SERPL QL: NEGATIVE
BUN SERPL-MCNC: 12 MG/DL (ref 5–25)
CALCIUM SERPL-MCNC: 8.9 MG/DL (ref 8.3–10.1)
CHLORIDE SERPL-SCNC: 106 MMOL/L (ref 96–108)
CO2 SERPL-SCNC: 25 MMOL/L (ref 21–32)
CREAT SERPL-MCNC: 1.31 MG/DL (ref 0.6–1.3)
EOSINOPHIL # BLD AUTO: 0.09 THOUSAND/ÂΜL (ref 0–0.61)
EOSINOPHIL NFR BLD AUTO: 2 % (ref 0–6)
ERYTHROCYTE [DISTWIDTH] IN BLOOD BY AUTOMATED COUNT: 14.3 % (ref 11.6–15.1)
GFR SERPL CREATININE-BSD FRML MDRD: 51 ML/MIN/1.73SQ M
GLUCOSE SERPL-MCNC: 157 MG/DL (ref 65–140)
GLUCOSE SERPL-MCNC: 208 MG/DL (ref 65–140)
HCT VFR BLD AUTO: 40.5 % (ref 36.5–49.3)
HGB BLD-MCNC: 12.6 G/DL (ref 12–17)
IMM GRANULOCYTES # BLD AUTO: 0 THOUSAND/UL (ref 0–0.2)
IMM GRANULOCYTES NFR BLD AUTO: 0 % (ref 0–2)
INR PPP: 3.05 (ref 0.84–1.19)
LACTATE SERPL-SCNC: 1.6 MMOL/L (ref 0.5–2)
LYMPHOCYTES # BLD AUTO: 1.86 THOUSANDS/ÂΜL (ref 0.6–4.47)
LYMPHOCYTES NFR BLD AUTO: 30 % (ref 14–44)
MCH RBC QN AUTO: 26.8 PG (ref 26.8–34.3)
MCHC RBC AUTO-ENTMCNC: 31.1 G/DL (ref 31.4–37.4)
MCV RBC AUTO: 86 FL (ref 82–98)
MONOCYTES # BLD AUTO: 0.37 THOUSAND/ÂΜL (ref 0.17–1.22)
MONOCYTES NFR BLD AUTO: 6 % (ref 4–12)
NEUTROPHILS # BLD AUTO: 3.79 THOUSANDS/ÂΜL (ref 1.85–7.62)
NEUTS SEG NFR BLD AUTO: 61 % (ref 43–75)
NRBC BLD AUTO-RTO: 0 /100 WBCS
PLATELET # BLD AUTO: 345 THOUSANDS/UL (ref 149–390)
PMV BLD AUTO: 9.4 FL (ref 8.9–12.7)
POTASSIUM SERPL-SCNC: 4.1 MMOL/L (ref 3.5–5.3)
PROT SERPL-MCNC: 7.3 G/DL (ref 6.4–8.4)
PROTHROMBIN TIME: 30.8 SECONDS (ref 11.6–14.5)
RBC # BLD AUTO: 4.71 MILLION/UL (ref 3.88–5.62)
RH BLD: POSITIVE
SODIUM SERPL-SCNC: 139 MMOL/L (ref 135–147)
SPECIMEN EXPIRATION DATE: NORMAL
WBC # BLD AUTO: 6.14 THOUSAND/UL (ref 4.31–10.16)

## 2022-11-17 RX ORDER — OXYBUTYNIN CHLORIDE 5 MG/1
10 TABLET, EXTENDED RELEASE ORAL
Status: DISCONTINUED | OUTPATIENT
Start: 2022-11-17 | End: 2022-11-19 | Stop reason: HOSPADM

## 2022-11-17 RX ORDER — ACETAMINOPHEN 325 MG/1
650 TABLET ORAL EVERY 6 HOURS PRN
Status: DISCONTINUED | OUTPATIENT
Start: 2022-11-17 | End: 2022-11-19 | Stop reason: HOSPADM

## 2022-11-17 RX ORDER — REPAGLINIDE 1 MG/1
1 TABLET ORAL
COMMUNITY
Start: 2022-07-23 | End: 2023-07-23

## 2022-11-17 RX ORDER — AMLODIPINE BESYLATE 5 MG/1
5 TABLET ORAL DAILY
Status: DISCONTINUED | OUTPATIENT
Start: 2022-11-18 | End: 2022-11-19 | Stop reason: HOSPADM

## 2022-11-17 RX ORDER — PRAVASTATIN SODIUM 40 MG
40 TABLET ORAL
Status: DISCONTINUED | OUTPATIENT
Start: 2022-11-17 | End: 2022-11-19 | Stop reason: HOSPADM

## 2022-11-17 RX ORDER — INSULIN LISPRO 100 [IU]/ML
1-5 INJECTION, SOLUTION INTRAVENOUS; SUBCUTANEOUS
Status: DISCONTINUED | OUTPATIENT
Start: 2022-11-18 | End: 2022-11-19 | Stop reason: HOSPADM

## 2022-11-17 RX ORDER — INSULIN LISPRO 100 [IU]/ML
1-5 INJECTION, SOLUTION INTRAVENOUS; SUBCUTANEOUS
Status: DISCONTINUED | OUTPATIENT
Start: 2022-11-17 | End: 2022-11-19 | Stop reason: HOSPADM

## 2022-11-17 RX ADMIN — OXYBUTYNIN 10 MG: 5 TABLET, FILM COATED, EXTENDED RELEASE ORAL at 22:41

## 2022-11-17 RX ADMIN — INSULIN LISPRO 1 UNITS: 100 INJECTION, SOLUTION INTRAVENOUS; SUBCUTANEOUS at 22:43

## 2022-11-17 RX ADMIN — PRAVASTATIN SODIUM 40 MG: 40 TABLET ORAL at 22:41

## 2022-11-17 RX ADMIN — IOHEXOL 100 ML: 350 INJECTION, SOLUTION INTRAVENOUS at 15:35

## 2022-11-17 NOTE — TELEPHONE ENCOUNTER
Reason for Disposition  • Taking Coumadin (warfarin) or other strong blood thinner, or known bleeding disorder (e g , thrombocytopenia)    Answer Assessment - Initial Assessment Questions  1  APPEARANCE of BLOOD: "What color is it?" "Is it passed separately, on the surface of the stool, or mixed in with the stool?"       Started out as pink water; then it is bright red on toilet paper  2  AMOUNT: "How much blood was passed?"       When he wipes   3  FREQUENCY: "How many times has blood been passed with the stools?"       Every time he does   4  ONSET: "When was the blood first seen in the stools?" (Days or weeks)       2 weeks ago     8  BLOOD THINNERS: "Do you take any blood thinners?" (e g , Coumadin/warfarin, Pradaxa/dabigatran, aspirin)      Coumadin   9   OTHER SYMPTOMS: "Do you have any other symptoms?"  (e g , abdominal pain, vomiting, dizziness, fever)     Severe constant rectal pain; using tylenol, preparation H and special pillow to sit    Protocols used: RECTAL BLEEDING-ADULT-OH

## 2022-11-17 NOTE — TELEPHONE ENCOUNTER
Spoke with patients wife  Patient last OV 2020  Patient c/o rectal bleeding, severe rectal pain to his hip for 2 weeks  She does not know which side his hip is hurting  She will bring patient to ED for evaluation

## 2022-11-17 NOTE — ASSESSMENT & PLAN NOTE
Two week history of hematochezia, worsening this morning  Denies clots  CT abdomen pelvis showing linear radiopaque structure in posterior anorectal wall measuring 1 3 cm  Colonic diverticulosis without diverticulitis   Last colonoscopy 03/2020 revealed diverticula, 2 sessile polyps removed  Small internal hemorrhoids with no bleeding  Differential includes diverticulosis versus hemorrhoids versus less likely AVM/UGIB  · Hemoglobin and vital stable  · Hold warfarin, INR 3 05   Repeat INR in a m     · GI consulted  · NPO at midnight  · Monitor hemoglobin with am CBC

## 2022-11-17 NOTE — H&P
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 ([facility] )  Assessment & Plan  On warfarin  INR 3 05, hold in setting of worsening hematochezia  SSS (sick sinus syndrome) ([place] [address] )  Assessment & Plan  Status post permanent pacemaker    Type 2 diabetes mellitus without complication [facility])  Assessment & Plan    Lab Results   Component Value Date    HGBA1C 7 4 (H) 10/14/2022   Hold oral hypoglycemic agent  Continue with SSI    Stage 2 chronic kidney disease  Assessment & Plan  Lab Results   Component Value Date    EGFR 51 11/17/2022    EGFR 60 02/21/2022    EGFR 54 06/03/2021    CREATININE 1 31 (H) 11/17/2022    CREATININE 1 14 02/21/2022    CREATININE 1 42 (H) 06/03/2021   Baseline creatinine appears to be 1 2 to 1 4  Creatinine 1 31  · Avoid further nephrotoxic agent  · Monitor creatinine with a m  BMP    Hypertension associated with diabetes ([facility] )  Assessment & Plan  Blood pressure stable  · Continue home amlodipine 5 mg daily  · Monitor vitals closely    * Hematochezia  Assessment & Plan  Two week history of hematochezia, worsening this morning  Denies clots  CT abdomen pelvis showing linear radiopaque structure in posterior anorectal wall measuring 1 3 cm  Colonic diverticulosis without diverticulitis   Last colonoscopy 03/2020 revealed diverticula, 2 sessile polyps removed  Small internal hemorrhoids with no bleeding  Differential includes diverticulosis versus hemorrhoids versus less likely AVM/UGIB     · Hemoglobin and vital stable  · Hold warfarin, INR 3 05   Repeat INR in a m  · GI consulted  · NPO at midnight  · Monitor hemoglobin with am CBC      VTE Pharmacologic Prophylaxis: VTE Score: 6 High Risk (Score >/= 5) - Pharmacological DVT Prophylaxis Contraindicated  Sequential Compression Devices Ordered  Code Status: Level 1 - Full Code   Discussion with family: Updated  (wife) via phone  Anticipated Length of Stay: Patient will be admitted on an observation basis with an anticipated length of stay of less than 2 midnights secondary to Worsening hematochezia  Total Time for Visit, including Counseling / Coordination of Care: 45 minutes Greater than 50% of this total time spent on direct patient counseling and coordination of care  Chief Complaint:  2 week history of blood in stool, worsening this morning    History of Present Illness:  Dewey Leyden is a [de-identified] y o  male  with past medical history of type 2 diabetes, CKD stage 3, sick sinus syndrome s/p PPM, hypertension, DVT of the axillary vein, chronic AFib on warfarin, history of hemorrhoids who presents to the ED with ongoing rectal bleeding for the past 2 weeks that increased this morning  Notes to have some right-sided flank pain as well  Denies any fevers or chills  Denies any recent illnesses or sicknesses  Denies hematuria  Confirm level 1 full code status  Patient admitted for further management and workup of hematochezia and GI evaluation  Review of Systems:  Review of Systems   Constitutional: Negative for activity change, appetite change, fatigue and fever  HENT: Negative for congestion, postnasal drip, rhinorrhea and sinus pain  Eyes: Negative for photophobia, pain and discharge  Respiratory: Negative for apnea, cough, chest tightness, shortness of breath and wheezing  Cardiovascular: Negative for chest pain and leg swelling  Gastrointestinal: Positive for blood in stool   Negative for abdominal distention, abdominal pain, constipation, diarrhea, nausea and vomiting  Endocrine: Negative for polyuria  Genitourinary: Negative for decreased urine volume, difficulty urinating, dysuria, hematuria and urgency  Musculoskeletal: Negative for back pain, myalgias and neck pain  Skin: Negative for pallor, rash and wound  Neurological: Negative for dizziness, tremors, weakness, light-headedness and headaches  Hematological: Does not bruise/bleed easily  Psychiatric/Behavioral: Negative for agitation, behavioral problems and suicidal ideas  The patient is not nervous/anxious and is not hyperactive  Past Medical and Surgical History:   Past Medical History:   Diagnosis Date   • Chronic kidney disease    • Colon polyp    • 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)    • Diverticulosis    • History of cardiac cath    • Kidney stone    • SSS (sick sinus syndrome) ([facility] )        Past Surgical History:   Procedure Laterality Date   • ANAL MANOMETRY  10/06/2015   • CARDIAC PACEMAKER PLACEMENT     • CATARACT EXTRACTION     • COLONOSCOPY     • CYSTOSTOMY     • EGD AND COLONOSCOPY     • REPLACEMENT TOTAL KNEE     • TOOTH EXTRACTION  04/2022       Meds/Allergies:  Prior to Admission medications    Medication Sig Start Date End Date Taking?  Authorizing Provider   amLODIPine (NORVASC) 5 mg tablet  3/27/18   Historical Provider, MD   Cholecalciferol 25 MCG (1000 UT) tablet Take 1,000 Units by mouth daily 3/24/21 4/16/22  Historical Provider, MD   metFORMIN (GLUCOPHAGE-XR) 500 mg 24 hr tablet Take 2 tablets (1,000 mg total) by mouth 2 (two) times a day with meals 2 tabs 2 times daily 4/16/22   Ben Montano MD   Multiple Vitamins-Minerals (MULTIVITAMIN ADULT PO) Take 1 tablet by mouth daily    Historical Provider, MD   oxybutynin (DITROPAN-XL) 10 MG 24 hr tablet Take 10 mg by mouth daily at bedtime    Historical Provider, MD   predniSONE 5 mg tablet 4 tablets daily for 3 days, 3 tablets daily for 3 days, 2 tablets daily for 3 days, 1 tablet daily for 3 days 1/20/22 Alejandra Hermosillo MD   simvastatin (ZOCOR) 20 mg tablet Take 1 tablet (20 mg total) by mouth daily 10/28/20   Alejandra Hermosillo MD   warfarin (COUMADIN) 7 5 mg tablet Take 7 5 mg by mouth daily      Historical Provider, MD     I have reviewed home medications with patient personally  Allergies: No Known Allergies    Social History:  Marital Status: /Civil Union   Substance Use History:   Social History     Substance and Sexual Activity   Alcohol Use No     Social History     Tobacco Use   Smoking Status Former   Smokeless Tobacco Never     Social History     Substance and Sexual Activity   Drug Use No       Family History:  Family History   Problem Relation Age of Onset   • Heart disease Mother    • Heart disease Father        Physical Exam:     Vitals:   Blood Pressure: 161/75 (11/17/22 1844)  Pulse: 62 (11/17/22 1844)  Temperature: 97 7 °F (36 5 °C) (11/17/22 1844)  Temp Source: Oral (11/17/22 1844)  Respirations: 16 (11/17/22 1844)  Height: 6' 2" (188 cm) (11/17/22 1844)  Weight - Scale: 83 kg (183 lb) (11/17/22 1844)  SpO2: 97 % (11/17/22 1844)    Physical Exam  Vitals reviewed  Constitutional:       General: He is not in acute distress  Appearance: He is well-developed  He is not diaphoretic  HENT:      Head: Normocephalic and atraumatic  Mouth/Throat:      Pharynx: No oropharyngeal exudate  Eyes:      General: No scleral icterus  Extraocular Movements: Extraocular movements intact  Conjunctiva/sclera: Conjunctivae normal    Neck:      Vascular: No JVD  Trachea: No tracheal deviation  Cardiovascular:      Rate and Rhythm: Normal rate and regular rhythm  Heart sounds: No murmur heard  No friction rub  No gallop  Pulmonary:      Effort: Pulmonary effort is normal  No respiratory distress  Breath sounds: No stridor  No wheezing  Abdominal:      General: There is no distension  Palpations: Abdomen is soft  There is no mass  Tenderness:  There is no abdominal tenderness  There is no right CVA tenderness or left CVA tenderness  Musculoskeletal:         General: No tenderness  Normal range of motion  Right lower leg: No edema  Left lower leg: No edema  Skin:     General: Skin is warm and dry  Coloration: Skin is not pale  Findings: No erythema  Neurological:      Mental Status: He is alert and oriented to person, place, and time  Psychiatric:         Behavior: Behavior normal          Thought Content: Thought content normal           Additional Data:     Lab Results:  Results from last 7 days   Lab Units 11/17/22  1438   WBC Thousand/uL 6 14   HEMOGLOBIN g/dL 12 6   HEMATOCRIT % 40 5   PLATELETS Thousands/uL 345   NEUTROS PCT % 61   LYMPHS PCT % 30   MONOS PCT % 6   EOS PCT % 2     Results from last 7 days   Lab Units 11/17/22  1438   SODIUM mmol/L 139   POTASSIUM mmol/L 4 1   CHLORIDE mmol/L 106   CO2 mmol/L 25   BUN mg/dL 12   CREATININE mg/dL 1 31*   ANION GAP mmol/L 8   CALCIUM mg/dL 8 9   ALBUMIN g/dL 3 6   TOTAL BILIRUBIN mg/dL 0 26   ALK PHOS U/L 64   ALT U/L 18   AST U/L 13   GLUCOSE RANDOM mg/dL 157*     Results from last 7 days   Lab Units 11/17/22  1438   INR  3 05*             Results from last 7 days   Lab Units 11/17/22  1438   LACTIC ACID mmol/L 1 6       Lines/Drains:  Invasive Devices     Peripheral Intravenous Line  Duration           Peripheral IV 06/03/21 Right Antecubital 532 days    Peripheral IV 11/17/22 Distal;Left;Upper;Ventral (anterior) Arm <1 day                    Imaging: Reviewed radiology reports from this admission including: chest CT scan  CT abdomen pelvis with contrast   Final Result by Elsa Starks MD (11/17 1601)      Linear radiopaque structure in the posterior anorectal wall measuring approximately 1 3 cm image 2/78  This may represent a foreign body or a postsurgical finding  Correlate with any previous procedure and/or rectal exam for tenderness        Colonic diverticulosis without diverticulitis  Small hiatal hernia  Gallstones without surrounding inflammation  Workstation performed: [id number]             EKG and Other Studies Reviewed on Admission:   · EKG:EKG pending    ** Please Note: This note has been constructed using a voice recognition system   **

## 2022-11-17 NOTE — ASSESSMENT & PLAN NOTE
Lab Results   Component Value Date    HGBA1C 7 4 (H) 10/14/2022   Hold oral hypoglycemic agent  Continue with SSI

## 2022-11-17 NOTE — ASSESSMENT & PLAN NOTE
Currently rate controlled  · On warfarin, holding in setting of hematochezia and INR 3 05  · Continue close outpatient follow-up with Cardiology

## 2022-11-17 NOTE — ASSESSMENT & PLAN NOTE
Lab Results   Component Value Date    EGFR 51 11/17/2022    EGFR 60 02/21/2022    EGFR 54 06/03/2021    CREATININE 1 31 (H) 11/17/2022    CREATININE 1 14 02/21/2022    CREATININE 1 42 (H) 06/03/2021   Baseline creatinine appears to be 1 2 to 1 4  Creatinine 1 31  · Avoid further nephrotoxic agent  · Monitor creatinine with a m   BMP

## 2022-11-17 NOTE — ED PROVIDER NOTES
History  Chief Complaint   Patient presents with   • Flank Pain     Pt arrives with c/o right sided pain and rectum pain that has been going on for a few weeks  Pt states he was having bright red blood per rectum, after having a bowel movement but that has since resolved  63-year-old male patient here for rectal bleeding  Onset about 2 weeks ago and getting progressively worse today  He has history of hemorrhoids but no bleeding in the past such as this  He is on Coumadin for prior VTE  He is having right hip pain/right flank pain  No direct trauma that he recalls  He denies fevers chills, nausea vomiting, lightheadedness or dizziness  No shortness of breath or chest pain  He follows with GI here and has history of polyps and diverticulosis  History provided by:  Patient   used: No    Rectal Bleeding - Minor  Quality:  Bright red  Amount: Moderate  Duration:  2 weeks  Timing:  Constant  Chronicity:  New  Similar prior episodes: no    Relieved by:  Nothing  Worsened by:  Nothing  Ineffective treatments:  None tried  Associated symptoms: no abdominal pain, no dizziness, no epistaxis, no fever, no hematemesis, no light-headedness, no loss of consciousness, no recent illness and no vomiting    Risk factors: anticoagulant use        Prior to Admission Medications   Prescriptions Last Dose Informant Patient Reported? Taking?    Cholecalciferol 25 MCG (1000 UT) tablet   Yes No   Sig: Take 1,000 Units by mouth daily   Multiple Vitamins-Minerals (MULTIVITAMIN ADULT PO)   Yes No   Sig: Take 1 tablet by mouth daily   amLODIPine (NORVASC) 5 mg tablet   Yes No   metFORMIN (GLUCOPHAGE-XR) 500 mg 24 hr tablet   No No   Sig: Take 2 tablets (1,000 mg total) by mouth 2 (two) times a day with meals 2 tabs 2 times daily   oxybutynin (DITROPAN-XL) 10 MG 24 hr tablet   Yes No   Sig: Take 10 mg by mouth daily at bedtime   predniSONE 5 mg tablet   No No   Si tablets daily for 3 days, 3 tablets daily for 3 days, 2 tablets daily for 3 days, 1 tablet daily for 3 days   simvastatin (ZOCOR) 20 mg tablet   No No   Sig: Take 1 tablet (20 mg total) by mouth daily   warfarin (COUMADIN) 7 5 mg tablet   Yes No   Sig: Take 7 5 mg by mouth daily        Facility-Administered Medications: None       Past Medical History:   Diagnosis Date   • Chronic kidney disease    • Colon polyp    • 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)    • Diverticulosis    • History of cardiac cath    • Kidney stone    • SSS (sick sinus syndrome) ([facility] )        Past Surgical History:   Procedure Laterality Date   • ANAL MANOMETRY  10/06/2015   • CARDIAC PACEMAKER PLACEMENT     • CATARACT EXTRACTION     • COLONOSCOPY     • CYSTOSTOMY     • EGD AND COLONOSCOPY     • REPLACEMENT TOTAL KNEE     • TOOTH EXTRACTION  04/2022       Family History   Problem Relation Age of Onset   • Heart disease Mother    • Heart disease Father      I have reviewed and agree with the history as documented  E-Cigarette/Vaping   • E-Cigarette Use Never User      E-Cigarette/Vaping Substances   • Nicotine No    • THC No    • CBD No    • Flavoring No    • Other No    • Unknown No      Social History     Tobacco Use   • Smoking status: Former   • Smokeless tobacco: Never   Vaping Use   • Vaping Use: Never used   Substance Use Topics   • Alcohol use: No   • Drug use: No       Review of Systems   Constitutional: Negative for chills and fever  HENT: Negative for ear pain, nosebleeds and sore throat  Eyes: Negative for pain and visual disturbance  Respiratory: Negative for cough and shortness of breath  Cardiovascular: Negative for chest pain and palpitations  Gastrointestinal: Positive for anal bleeding, blood in stool and hematochezia  Negative for abdominal pain, hematemesis and vomiting  Genitourinary: Negative for dysuria and hematuria  Musculoskeletal: Negative for arthralgias and back pain  Skin: Negative for color change and rash  Neurological: Negative for dizziness, seizures, loss of consciousness, syncope and light-headedness  All other systems reviewed and are negative  Physical Exam  Physical Exam  Vitals and nursing note reviewed  Constitutional:       General: He is not in acute distress  Appearance: He is well-developed  HENT:      Head: Normocephalic and atraumatic  Eyes:      Conjunctiva/sclera: Conjunctivae normal    Cardiovascular:      Rate and Rhythm: Normal rate and regular rhythm  Heart sounds: No murmur heard  Pulmonary:      Effort: Pulmonary effort is normal  No respiratory distress  Breath sounds: Normal breath sounds  Abdominal:      Palpations: Abdomen is soft  Tenderness: There is no abdominal tenderness  Genitourinary:     Rectum: Guaiac result positive  External hemorrhoid and internal hemorrhoid present  Musculoskeletal:         General: No swelling  Cervical back: Neck supple  Skin:     General: Skin is warm and dry  Capillary Refill: Capillary refill takes less than 2 seconds  Neurological:      Mental Status: He is alert     Psychiatric:         Mood and Affect: Mood normal          Vital Signs  ED Triage Vitals   Temperature Pulse Respirations Blood Pressure SpO2   11/17/22 1336 11/17/22 1336 11/17/22 1336 11/17/22 1336 11/17/22 1336   97 6 °F (36 4 °C) 78 20 113/67 99 %      Temp Source Heart Rate Source Patient Position - Orthostatic VS BP Location FiO2 (%)   11/17/22 1336 11/17/22 1336 11/17/22 1727 11/17/22 1336 --   Oral Monitor Lying Left arm       Pain Score       11/17/22 1727       No Pain           Vitals:    11/17/22 1336 11/17/22 1727   BP: 113/67 163/80   Pulse: 78 65   Patient Position - Orthostatic VS:  Lying         Visual Acuity      ED Medications  Medications   iohexol (OMNIPAQUE) 350 MG/ML injection (SINGLE-DOSE) 100 mL (100 mL Intravenous Given 11/17/22 1535)       Diagnostic Studies  Results Reviewed     Procedure Component Value Units Date/Time    Lactic acid [[id number]]  (Normal) Collected: 11/17/22 1438    Lab Status: Final result Specimen: Blood from Arm, Right Updated: 11/17/22 1517     LACTIC ACID 1 6 mmol/L     Narrative:      Result may be elevated if tourniquet was used during collection      Protime-INR [[id number]]  (Abnormal) Collected: 11/17/22 1438    Lab Status: Final result Specimen: Blood from Arm, Right Updated: 11/17/22 1514     Protime 30 8 seconds      INR 3 05    APTT [[id number]]  (Abnormal) Collected: 11/17/22 1438    Lab Status: Final result Specimen: Blood from Arm, Right Updated: 11/17/22 1514     PTT 43 seconds     Comprehensive metabolic panel [[id number]]  (Abnormal) Collected: 11/17/22 1438    Lab Status: Final result Specimen: Blood from Arm, Right Updated: 11/17/22 1514     Sodium 139 mmol/L      Potassium 4 1 mmol/L      Chloride 106 mmol/L      CO2 25 mmol/L      ANION GAP 8 mmol/L      BUN 12 mg/dL      Creatinine 1 31 mg/dL      Glucose 157 mg/dL      Calcium 8 9 mg/dL      AST 13 U/L      ALT 18 U/L      Alkaline Phosphatase 64 U/L      Total Protein 7 3 g/dL      Albumin 3 6 g/dL      Total Bilirubin 0 26 mg/dL      eGFR 51 ml/min/1 73sq m     Narrative:      [place] guidelines for Chronic Kidney Disease (CKD):   •  Stage 1 with normal or high GFR (GFR > 90 mL/min/1 73 square meters)  •  Stage 2 Mild CKD (GFR = 60-89 mL/min/1 73 square meters)  •  Stage 3A Moderate CKD (GFR = 45-59 mL/min/1 73 square meters)  •  Stage 3B Moderate CKD (GFR = 30-44 mL/min/1 73 square meters)  •  Stage 4 Severe CKD (GFR = 15-29 mL/min/1 73 square meters)  •  Stage 5 End Stage CKD (GFR <15 mL/min/1 73 square meters)  Note: GFR calculation is accurate only with a steady state creatinine    CBC and differential [[id number]]  (Abnormal) Collected: 11/17/22 1438    Lab Status: Final result Specimen: Blood from Arm, Right Updated: 11/17/22 1446     WBC 6 14 Thousand/uL      RBC 4 71 Million/uL      Hemoglobin 12 6 g/dL      Hematocrit 40 5 %      MCV 86 fL      MCH 26 8 pg      MCHC 31 1 g/dL      RDW 14 3 %      MPV 9 4 fL      Platelets 364 Thousands/uL      nRBC 0 /100 WBCs      Neutrophils Relative 61 %      Immat GRANS % 0 %      Lymphocytes Relative 30 %      Monocytes Relative 6 %      Eosinophils Relative 2 %      Basophils Relative 1 %      Neutrophils Absolute 3 79 Thousands/µL      Immature Grans Absolute 0 00 Thousand/uL      Lymphocytes Absolute 1 86 Thousands/µL      Monocytes Absolute 0 37 Thousand/µL      Eosinophils Absolute 0 09 Thousand/µL      Basophils Absolute 0 03 Thousands/µL                  CT abdomen pelvis with contrast   Final Result by Jeannine Richard MD (11/17 7573)      Linear radiopaque structure in the posterior anorectal wall measuring approximately 1 3 cm image 2/78  This may represent a foreign body or a postsurgical finding  Correlate with any previous procedure and/or rectal exam for tenderness  Colonic diverticulosis without diverticulitis  Small hiatal hernia  Gallstones without surrounding inflammation  Workstation performed: [id number]                    Procedures  Procedures         ED Course                                             MDM  Number of Diagnoses or Management Options  GI bleed: new and requires workup  Diagnosis management comments: Differential diagnosis including but not limited to: upper GI bleed, esophageal varices, gastritis, PUD, diverticulosis, AVM, hemorrhoids, anemia, coagulopathy, liver disease, tumor, anal fissure  Plan:  CT abdomen pelvis  Labs  Dispo pending  Amount and/or Complexity of Data Reviewed  Clinical lab tests: ordered and reviewed  Tests in the radiology section of CPT®: ordered and reviewed    Risk of Complications, Morbidity, and/or Mortality  Presenting problems: moderate  Management options: low  General comments: [de-identified]year-old with rectal bleeding  Hemoglobin stable  Normal vital signs    He is on Coumadin, INR is 3 0  CT scan reveals diverticulosis without evidence of diverticulitis  Radiopaque area of the rectum, unclear etiology, not palpable on exam   Given rectal bleeding on Coumadin will admit for observation  Patient agrees with this plan  Stable at the time of admission  Patient Progress  Patient progress: stable      Disposition  Final diagnoses:   GI bleed     Time reflects when diagnosis was documented in both MDM as applicable and the Disposition within this note     Time User Action Codes Description Comment    11/17/2022  5:26 PM Kali Barahona Add [K92 2] GI bleed       ED Disposition     ED Disposition   Admit    Condition   Stable    Date/Time   Thu Nov 17, 2022  5:26 PM    Comment   Case was discussed with Dr Stephanie Haq and the patient's admission status was agreed to be Admission Status: observation status to the service of Dr Stephanie Haq   Follow-up Information    None         Patient's Medications   Discharge Prescriptions    No medications on file       No discharge procedures on file      PDMP Review     None          ED Provider  Electronically Signed by           Juliana Gauthier PA-C  11/17/22 1351

## 2022-11-17 NOTE — PLAN OF CARE
Problem: PAIN - ADULT  Goal: Verbalizes/displays adequate comfort level or baseline comfort level  Description: Interventions:  - Encourage patient to monitor pain and request assistance  - Assess pain using appropriate pain scale  - Administer analgesics based on type and severity of pain and evaluate response  - Implement non-pharmacological measures as appropriate and evaluate response  - Consider cultural and social influences on pain and pain management  - Notify physician/advanced practitioner if interventions unsuccessful or patient reports new pain  Outcome: Progressing     Problem: INFECTION - ADULT  Goal: Absence or prevention of progression during hospitalization  Description: INTERVENTIONS:  - Assess and monitor for signs and symptoms of infection  - Monitor lab/diagnostic results  - Monitor all insertion sites, i e  indwelling lines, tubes, and drains  - Monitor endotracheal if appropriate and nasal secretions for changes in amount and color  - [place] appropriate cooling/warming therapies per order  - Administer medications as ordered  - Instruct and encourage patient and family to use good hand hygiene technique  - Identify and instruct in appropriate isolation precautions for identified infection/condition  Outcome: Progressing

## 2022-11-18 PROBLEM — R93.89 ABNORMAL CT SCAN: Status: ACTIVE | Noted: 2022-11-18

## 2022-11-18 LAB
ANION GAP SERPL CALCULATED.3IONS-SCNC: 6 MMOL/L (ref 4–13)
BUN SERPL-MCNC: 12 MG/DL (ref 5–25)
CALCIUM SERPL-MCNC: 8.7 MG/DL (ref 8.3–10.1)
CHLORIDE SERPL-SCNC: 108 MMOL/L (ref 96–108)
CO2 SERPL-SCNC: 27 MMOL/L (ref 21–32)
CREAT SERPL-MCNC: 1.16 MG/DL (ref 0.6–1.3)
ERYTHROCYTE [DISTWIDTH] IN BLOOD BY AUTOMATED COUNT: 14.3 % (ref 11.6–15.1)
GFR SERPL CREATININE-BSD FRML MDRD: 59 ML/MIN/1.73SQ M
GLUCOSE P FAST SERPL-MCNC: 120 MG/DL (ref 65–99)
GLUCOSE SERPL-MCNC: 103 MG/DL (ref 65–140)
GLUCOSE SERPL-MCNC: 116 MG/DL (ref 65–140)
GLUCOSE SERPL-MCNC: 120 MG/DL (ref 65–140)
GLUCOSE SERPL-MCNC: 186 MG/DL (ref 65–140)
GLUCOSE SERPL-MCNC: 80 MG/DL (ref 65–140)
HCT VFR BLD AUTO: 37.6 % (ref 36.5–49.3)
HGB BLD-MCNC: 12.3 G/DL (ref 12–17)
INR PPP: 2.72 (ref 0.84–1.19)
MCH RBC QN AUTO: 27.2 PG (ref 26.8–34.3)
MCHC RBC AUTO-ENTMCNC: 32.7 G/DL (ref 31.4–37.4)
MCV RBC AUTO: 83 FL (ref 82–98)
PLATELET # BLD AUTO: 306 THOUSANDS/UL (ref 149–390)
PMV BLD AUTO: 9.3 FL (ref 8.9–12.7)
POTASSIUM SERPL-SCNC: 4 MMOL/L (ref 3.5–5.3)
PROTHROMBIN TIME: 28.3 SECONDS (ref 11.6–14.5)
RBC # BLD AUTO: 4.53 MILLION/UL (ref 3.88–5.62)
SODIUM SERPL-SCNC: 141 MMOL/L (ref 135–147)
WBC # BLD AUTO: 4.36 THOUSAND/UL (ref 4.31–10.16)

## 2022-11-18 RX ORDER — SIMVASTATIN 20 MG
20 TABLET ORAL
COMMUNITY

## 2022-11-18 RX ADMIN — POLYETHYLENE GLYCOL 3350, SODIUM SULFATE ANHYDROUS, SODIUM BICARBONATE, SODIUM CHLORIDE, POTASSIUM CHLORIDE 4000 ML: 236; 22.74; 6.74; 5.86; 2.97 POWDER, FOR SOLUTION ORAL at 16:27

## 2022-11-18 RX ADMIN — AMLODIPINE BESYLATE 5 MG: 5 TABLET ORAL at 08:32

## 2022-11-18 RX ADMIN — PRAVASTATIN SODIUM 40 MG: 40 TABLET ORAL at 16:27

## 2022-11-18 RX ADMIN — OXYBUTYNIN 10 MG: 5 TABLET, FILM COATED, EXTENDED RELEASE ORAL at 22:29

## 2022-11-18 RX ADMIN — INSULIN LISPRO 1 UNITS: 100 INJECTION, SOLUTION INTRAVENOUS; SUBCUTANEOUS at 16:28

## 2022-11-18 NOTE — ASSESSMENT & PLAN NOTE
Currently rate controlled  · On warfarin, holding in setting of hematochezia and INR 3 05  · Monitor INR  · Continue close outpatient follow-up with Cardiology

## 2022-11-18 NOTE — ASSESSMENT & PLAN NOTE
· Abdominal CT shows - "Linear radiopaque structure in the posterior anorectal wall measuring approximately 1 3 cm image 2/78   This may represent a foreign body or a postsurgical finding    "  · Patient denies any surgical procedures previously  · Patient vehemently deny inserting anything in the rectal vault  He denies eating any solid objects also  · The colonoscopy should help clarify this hopefully

## 2022-11-18 NOTE — PROGRESS NOTES
0000 [facility]  Progress Note - Anabel Dunne 1940, [de-identified] y o  male MRN: [id number]  Unit/Bed#: -01 Encounter: [id number]  Primary Care Provider: Vanna Ruiz MD   Date and time admitted to hospital: 11/17/2022  2:00 PM    * Hematochezia  Assessment & Plan  Two week history of hematochezia, worsening this morning  Denies clots  CT abdomen pelvis showing linear radiopaque structure in posterior anorectal wall measuring 1 3 cm  Colonic diverticulosis without diverticulitis   Last colonoscopy 03/2020 revealed diverticula, 2 sessile polyps removed  Small internal hemorrhoids with no bleeding  Differential includes diverticulosis versus hemorrhoids versus less likely AVM/UGIB  · Hemoglobin and vital stable  · Hold warfarin, INR 3 05   Repeat INR in a m  · GI consulted  · Planned for colonoscopy on 11/19  · NPO at midnight tonight  · Clear liquid diet, colnic prep ordered by GI  · Monitor hemoglobin with am CBC      Abnormal CT scan  Assessment & Plan  · Abdominal CT shows - "Linear radiopaque structure in the posterior anorectal wall measuring approximately 1 3 cm image 2/78   This may represent a foreign body or a postsurgical finding    "  · Patient denies any surgical procedures previously  · Patient vehemently deny inserting anything in the rectal vault  He denies eating any solid objects also  · The colonoscopy should help clarify this hopefully  Chronic atrial fibrillation (HCC)  Assessment & Plan  Currently rate controlled  · On warfarin, holding in setting of hematochezia and INR 3 05  · Monitor INR  · Continue close outpatient follow-up with Cardiology    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 ([facility])  Assessment & Plan  · On warfarin prior to admission  INR 3 05, hold in setting of worsening hematochezia      SSS (sick sinus syndrome) ([facility])  Assessment & Plan  · Status post permanent pacemaker    Type 2 diabetes mellitus without complication [facility])  Assessment & Plan      · Hold oral hypoglycemic agent  · Continue with SSI  · Monitor blood glucose    Stage 2 chronic kidney disease  Assessment & Plan  Lab Results   Component Value Date    EGFR 59 2022    EGFR 51 2022    EGFR 60 2022    CREATININE 1 16 2022    CREATININE 1 31 (H) 2022    CREATININE 1 14 2022   Baseline creatinine appears to be 1 2 to 1 4  Creatinine 1 31  · Avoid further nephrotoxic agent  · Monitor creatinine with a m  BMP    Hypertension associated with diabetes ([address] )  Assessment & Plan    · Continue home amlodipine 5 mg daily  · Monitor blood pressure    VTE Pharmacologic Prophylaxis:   Pharmacologic: Pharmacologic VTE Prophylaxis contraindicated due to Bleeding  Mechanical VTE Prophylaxis in Place: Yes    Patient Centered Rounds: I have performed bedside rounds with nursing staff today  Discussions with Specialists or Other Care Team Provider:  Discussed with care management team    Education and Discussions with Family / Patient:  Discussed with wife at the bedside    Time Spent for Care: 45 minutes  More than 50% of total time spent on counseling and coordination of care as described above  Current Length of Stay: 0 day(s)    Current Patient Status: Inpatient  Certification Statement: The patient will continue to require additional inpatient hospital stay due to need for GI workup    Discharge Plan: 24h-48h    Code Status: Level 1 - Full Code      Subjective:     Patient evaluated this morning  Wife at the bedside  He denies any further bleeding since hospitalization although he does feel some discomfort in the area of the rectum  Hemoglobin has been stable so far          Objective:     Vitals:   Temp (24hrs), Av 6 °F (36 4 °C), Min:97 4 °F (36 3 °C), Max:97 7 °F (36 5 °C)    Temp:  [97 4 °F (36 3 °C)-97 7 °F (36 5 °C)] 97 4 °F (36 3 °C)  HR:  [60-78] 60  Resp:  [16-20] 16  BP: (113-163)/(67-80) 150/73  SpO2:  [96 %-99 %] 96 %  Body mass index is 23 5 kg/m²  Input and Output Summary (last 24 hours): Intake/Output Summary (Last 24 hours) at 11/18/2022 1240  Last data filed at 11/18/2022 0700  Gross per 24 hour   Intake 0 ml   Output --   Net 0 ml       Physical Exam:     Physical Exam  Vitals and nursing note reviewed  Constitutional:       Appearance: Normal appearance  Comments: Male patient in bed, awake   HENT:      Head: Normocephalic and atraumatic  Right Ear: External ear normal       Left Ear: External ear normal       Nose: Nose normal  No congestion or rhinorrhea  Mouth/Throat:      Mouth: Mucous membranes are dry  Pharynx: Oropharynx is clear  No oropharyngeal exudate or posterior oropharyngeal erythema  Eyes:      General: No scleral icterus  Right eye: No discharge  Left eye: No discharge  Pupils: Pupils are equal, round, and reactive to light  Neck:      Vascular: No carotid bruit  Cardiovascular:      Rate and Rhythm: Normal rate and regular rhythm  Pulses: Normal pulses  Heart sounds: No murmur heard  No friction rub  No gallop  Pulmonary:      Effort: Pulmonary effort is normal  No respiratory distress  Breath sounds: Normal breath sounds  No stridor  No wheezing, rhonchi or rales  Abdominal:      General: Abdomen is flat  Bowel sounds are normal  There is no distension  Palpations: Abdomen is soft  There is no mass  Tenderness: There is no abdominal tenderness  There is no guarding or rebound  Hernia: No hernia is present  Musculoskeletal:         General: No swelling, tenderness, deformity or signs of injury  Normal range of motion  Cervical back: Normal range of motion  No rigidity  No muscular tenderness  Lymphadenopathy:      Cervical: No cervical adenopathy  Skin:     General: Skin is warm and dry  Capillary Refill: Capillary refill takes less than 2 seconds  Coloration: Skin is not jaundiced or pale  Findings: No bruising or erythema  Neurological:      General: No focal deficit present  Mental Status: He is alert and oriented to person, place, and time  Mental status is at baseline  Cranial Nerves: No cranial nerve deficit  Sensory: No sensory deficit  Motor: No weakness  Coordination: Coordination normal       Deep Tendon Reflexes: Reflexes normal    Psychiatric:         Mood and Affect: Mood normal          Behavior: Behavior normal          Thought Content: Thought content normal          Judgment: Judgment normal            Additional Data:     Labs:    Results from last 7 days   Lab Units 11/18/22  0505 11/17/22  1438   WBC Thousand/uL 4 36 6 14   HEMOGLOBIN g/dL 12 3 12 6   HEMATOCRIT % 37 6 40 5   PLATELETS Thousands/uL 306 345   NEUTROS PCT %  --  61   LYMPHS PCT %  --  30   MONOS PCT %  --  6   EOS PCT %  --  2     Results from last 7 days   Lab Units 11/18/22  0505 11/17/22  1438   SODIUM mmol/L 141 139   POTASSIUM mmol/L 4 0 4 1   CHLORIDE mmol/L 108 106   CO2 mmol/L 27 25   BUN mg/dL 12 12   CREATININE mg/dL 1 16 1 31*   ANION GAP mmol/L 6 8   CALCIUM mg/dL 8 7 8 9   ALBUMIN g/dL  --  3 6   TOTAL BILIRUBIN mg/dL  --  0 26   ALK PHOS U/L  --  64   ALT U/L  --  18   AST U/L  --  13   GLUCOSE RANDOM mg/dL 120 157*     Results from last 7 days   Lab Units 11/18/22  0505   INR  2 72*     Results from last 7 days   Lab Units 11/18/22  1045 11/18/22  0735 11/17/22  2100   POC GLUCOSE mg/dl 116 103 208*         Results from last 7 days   Lab Units 11/17/22  1438   LACTIC ACID mmol/L 1 6           * I Have Reviewed All Lab Data Listed Above  * Additional Pertinent Lab Tests Reviewed:  [facility]      Recent Cultures (last 7 days):           Last 24 Hours Medication List:   Current Facility-Administered Medications   Medication Dose Route Frequency Provider Last Rate   • acetaminophen  650 mg Oral Q6H PRN Casie Gordon DO     • amLODIPine 5 mg Oral Daily Casie Socorro Saraiya, DO     • insulin lispro  1-5 Units Subcutaneous TID AC Casie Socorro Saraiya, DO     • insulin lispro  1-5 Units Subcutaneous HS Casie Socorro Saraiya, DO     • oxybutynin  10 mg Oral HS Casie Socorro Saraiya, DO     • polyethylene glycol  4,000 mL Oral Once Chung Pandey PA-C     • pravastatin  40 mg Oral Daily With [address], DO          Today, Patient Was Seen By: Juanito Ramos MD    ** Please Note: Dictation voice to text software may have been used in the creation of this document   **

## 2022-11-18 NOTE — ASSESSMENT & PLAN NOTE
Lab Results   Component Value Date    EGFR 59 11/18/2022    EGFR 51 11/17/2022    EGFR 60 02/21/2022    CREATININE 1 16 11/18/2022    CREATININE 1 31 (H) 11/17/2022    CREATININE 1 14 02/21/2022   Baseline creatinine appears to be 1 2 to 1 4  Creatinine 1 31  · Avoid further nephrotoxic agent  · Monitor creatinine with a m   BMP

## 2022-11-18 NOTE — ASSESSMENT & PLAN NOTE
Two week history of hematochezia, worsening this morning  Denies clots  CT abdomen pelvis showing linear radiopaque structure in posterior anorectal wall measuring 1 3 cm  Colonic diverticulosis without diverticulitis   Last colonoscopy 03/2020 revealed diverticula, 2 sessile polyps removed  Small internal hemorrhoids with no bleeding  Differential includes diverticulosis versus hemorrhoids versus less likely AVM/UGIB  · Hemoglobin and vital stable  · Hold warfarin, INR 3 05   Repeat INR in a m     · GI consulted  · Planned for colonoscopy on 11/19  · NPO at midnight tonight  · Clear liquid diet, colnic prep ordered by GI  · Monitor hemoglobin with am CBC

## 2022-11-18 NOTE — CONSULTS
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, sick sinus syndrome status post pacer, and hypertension presents the [address] [place] [address] Emergency Department yesterday with a chief complaint of hematochezia  Patient reports that for the past 2 weeks he has been experiencing bright red blood per rectum  He reports he has also been suffering with hemorrhoids  Patient's last colonoscopy performed by Dr Scarlett Spear was in 2020  Colonoscopy at that time did show evidence of internal hemorrhoids, pan diverticular disease, 2 polyps removed by cold snare  Patient denies any abdominal pain  Patient denies any nausea or vomiting  Patient reports occasional episodes of diarrhea  Patient does report that he has 1 brother with colon cancer  CT abdomen and pelvis from admission yesterday shows a linear radiopaque structure in the posterior and rectal wall measuring approximately 1 3 cm  There was evidence of colonic diverticular disease without diverticulitis a small hiatal hernia as well as gallstones  Patient denies any use of enema therapy  Patient denies any ingestion of foreign body  REVIEW OF SYSTEMS:     CONSTITUTIONAL: Denies any fever, chills, or rigors  Good appetite, and no recent weight loss  HEENT: No earache or tinnitus  Denies hearing loss or visual disturbances  CARDIOVASCULAR: No chest pain or palpitations  RESPIRATORY: Denies any cough, hemoptysis, shortness of breath or dyspnea on exertion    GASTROINTESTINAL: As noted in the History of Present Illness  GENITOURINARY: No problems with urination  Denies any hematuria or dysuria  NEUROLOGIC: No dizziness or vertigo, denies headaches  MUSCULOSKELETAL: Denies any muscle or joint pain  SKIN: Denies skin rashes or itching  ENDOCRINE: Denies excessive thirst  Denies intolerance to heat or cold  PSYCHOSOCIAL: Denies depression or anxiety  Denies any recent memory loss       Historical Information   Past Medical History:   Diagnosis Date   • Chronic kidney disease    • Colon polyp    • 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 ([facility])    • Diverticulosis    • History of cardiac cath    • Kidney stone    • SSS (sick sinus syndrome) ([facility])      Past Surgical History:   Procedure Laterality Date   • ANAL MANOMETRY  10/06/2015   • CARDIAC PACEMAKER PLACEMENT     • CATARACT EXTRACTION     • COLONOSCOPY     • CYSTOSTOMY     • EGD AND COLONOSCOPY     • REPLACEMENT TOTAL KNEE     • TOOTH EXTRACTION  04/2022     Social History   Social History     Substance and Sexual Activity   Alcohol Use No     Social History     Substance and Sexual Activity   Drug Use No     Social History     Tobacco Use   Smoking Status Former   Smokeless Tobacco Never     Family History   Problem Relation Age of Onset   • Heart disease Mother    • Heart disease Father        Meds/Allergies     Medications Prior to Admission   Medication   • amLODIPine (NORVASC) 5 mg tablet   • Cholecalciferol 25 MCG (1000 UT) tablet   • metFORMIN (GLUCOPHAGE-XR) 500 mg 24 hr tablet   • Multiple Vitamins-Minerals (MULTIVITAMIN ADULT PO)   • oxybutynin (DITROPAN-XL) 10 MG 24 hr tablet   • repaglinide (PRANDIN) 1 mg tablet   • simvastatin (ZOCOR) 20 mg tablet   • warfarin (COUMADIN) 7 5 mg tablet   • predniSONE 5 mg tablet     Current Facility-Administered Medications   Medication Dose Route Frequency   • acetaminophen (TYLENOL) tablet 650 mg  650 mg Oral Q6H PRN   • amLODIPine (NORVASC) tablet 5 mg  5 mg Oral Daily   • insulin lispro (HumaLOG) 100 units/mL subcutaneous injection 1-5 Units  1-5 Units Subcutaneous TID AC   • insulin lispro (HumaLOG) 100 units/mL subcutaneous injection 1-5 Units  1-5 Units Subcutaneous HS   • oxybutynin (DITROPAN-XL) 24 hr tablet 10 mg  10 mg Oral HS   • polyethylene glycol (GOLYTELY) bowel prep 4,000 mL  4,000 mL Oral Once   • pravastatin (PRAVACHOL) tablet 40 mg  40 mg Oral Daily With Dinner       No Known Allergies    Objective   Blood pressure 150/73, pulse 60, temperature (!) 97 4 °F (36 3 °C), resp  rate 16, height 6' 2" (1 88 m), weight 83 kg (183 lb), SpO2 96 %  Intake/Output Summary (Last 24 hours) at 11/18/2022 1208  Last data filed at 11/18/2022 0700  Gross per 24 hour   Intake 0 ml   Output --   Net 0 ml         PHYSICAL EXAM:      General Appearance:   Alert, cooperative, no distress, appears stated age    HEENT:   Normocephalic, atraumatic, anicteric      Neck:  Supple, symmetrical, trachea midline, no adenopathy;    thyroid: no enlargement/tenderness/nodules; no carotid  bruit or JVD    Lungs:   Clear to auscultation bilaterally; no rales, rhonchi or wheezing; respirations unlabored    Heart[de-identified]   S1 and S2 normal; regular rate and rhythm; no murmur, rub, or gallop     Abdomen:   Soft, non-tender, non-distended; normal bowel sounds; no masses, no organomegaly    Genitalia:   Deferred    Rectal:   Deferred    Extremities:  No cyanosis, clubbing or edema    Pulses:  2+ and symmetric all extremities    Skin:  Skin color, texture, turgor normal, no rashes or lesions    Lymph nodes:  No palpable cervical, axillary or inguinal lymphadenopathy        Lab Results:   Admission on 11/17/2022   Component Date Value   • WBC 11/17/2022 6 14    • RBC 11/17/2022 4 71    • Hemoglobin 11/17/2022 12 6    • Hematocrit 11/17/2022 40 5    • MCV 11/17/2022 86    • MCH 11/17/2022 26 8    • MCHC 11/17/2022 31 1 (L)    • RDW 11/17/2022 14 3    • MPV 11/17/2022 9 4    • Platelets 86/54/5266 345    • nRBC 11/17/2022 0    • Neutrophils Relative 11/17/2022 61    • Immat GRANS % 11/17/2022 0    • Lymphocytes Relative 11/17/2022 30    • Monocytes Relative 11/17/2022 6    • Eosinophils Relative 11/17/2022 2    • Basophils Relative 11/17/2022 1    • Neutrophils Absolute 11/17/2022 3 79    • Immature Grans Absolute 11/17/2022 0 00    • Lymphocytes Absolute 11/17/2022 1 86    • Monocytes Absolute 11/17/2022 0 37    • Eosinophils Absolute 11/17/2022 0 09    • Basophils Absolute 11/17/2022 0 03    • Protime 11/17/2022 30 8 (H)    • INR 11/17/2022 3 05 (H)    • PTT 11/17/2022 43 (H)    • Sodium 11/17/2022 139    • Potassium 11/17/2022 4 1    • Chloride 11/17/2022 106    • CO2 11/17/2022 25    • ANION GAP 11/17/2022 8    • BUN 11/17/2022 12    • Creatinine 11/17/2022 1 31 (H)    • Glucose 11/17/2022 157 (H)    • Calcium 11/17/2022 8 9    • AST 11/17/2022 13    • ALT 11/17/2022 18    • Alkaline Phosphatase 11/17/2022 64    • Total Protein 11/17/2022 7 3    • Albumin 11/17/2022 3 6    • Total Bilirubin 11/17/2022 0 26    • eGFR 11/17/2022 51    • ABO Grouping 11/17/2022 A    • Rh Factor 11/17/2022 Positive    • Antibody Screen 11/17/2022 Negative    • Specimen Expiration Date 11/17/2022 [id number]    • LACTIC ACID 11/17/2022 1 6    • POC Glucose 11/17/2022 208 (H)    • Sodium 11/18/2022 141    • Potassium 11/18/2022 4 0    • Chloride 11/18/2022 108    • CO2 11/18/2022 27    • ANION GAP 11/18/2022 6    • BUN 11/18/2022 12    • Creatinine 11/18/2022 1 16    • Glucose 11/18/2022 120    • Glucose, Fasting 11/18/2022 120 (H)    • Calcium 11/18/2022 8 7    • eGFR 11/18/2022 59    • WBC 11/18/2022 4 36    • RBC 11/18/2022 4 53    • Hemoglobin 11/18/2022 12 3    • Hematocrit 11/18/2022 37 6    • MCV 11/18/2022 83    • MCH 11/18/2022 27 2    • MCHC 11/18/2022 32 7    • RDW 11/18/2022 14 3    • Platelets 83/62/5707 306    • MPV 11/18/2022 9 3    • Protime 11/18/2022 28 3 (H)    • INR 11/18/2022 2 72 (H)    • POC Glucose 11/18/2022 103    • POC Glucose 11/18/2022 116        Imaging Studies: I have personally reviewed pertinent imaging studies  ASSESSMENT & PLAN:  Hemorrhoids  Rectal bleeding  Abnormal CT scan suggesting a foreign body in the anal rectal wall   -[facility] with a chief complaint of rectal bleeding  -CT AP from admission shows linear radiopaque structure in the posterior rectal wall measuring approximate 1 3 cm, diverticular disease, small hiatal hernia, gallstones  -Hemoglobin level is stable at 12 3   -Patient's Coumadin is on hold  Patient's INR this morning is 2 72   -Will plan colonoscopy tomorrow   -Bowel prep this afternoon   -Clear liquid diet  -NPO midnight   -Continue trend CBC and INR  Further recommendations made after patient's colonoscopy is complete tomorrow    Patient will be seen examined by Dr Marbella Valle PA-C  11/18/2022,12:08 PM

## 2022-11-18 NOTE — PLAN OF CARE
Pt awaiting gi evaluation and maintains npo status at present    Reports hemorrhoids are a little painful this am

## 2022-11-18 NOTE — CASE MANAGEMENT
Case Management Assessment & Discharge Planning Note    Patient name Tara Storm  Location [facility] [id number] MRN [id number]  : 1940 Date 2022       Current Admission Date: 2022  Current Admission Diagnosis:Hematochezia   Patient Active Problem List    Diagnosis Date Noted   • Abnormal CT scan 2022   • COVID 2022   • Stage 3a chronic kidney disease ([facility] ) 2021   • Hematochezia 2020   • Chronic kidney disease-mineral and bone disorder 2020   • Chronic atrial fibrillation ([facility] ) 2019   • 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 [facility] ) 2018   • Pre-operative clearance 2018   • Hypertension associated with diabetes ([facility] ) 2017   • Stage 2 chronic kidney disease 2017   • Type 2 diabetes mellitus without complication ([facility] )    • SSS (sick sinus syndrome) ([facility] ) 2017      LOS (days): 0  Geometric Mean LOS (GMLOS) (days): 3 00  Days to GMLOS:2 8     OBJECTIVE:    Risk of Unplanned Readmission Score: 8 9         Current admission status: Inpatient       Preferred Pharmacy:   [address] [place] [address], [address] Arturo Dobbs  Phone: [phone] Fax: [phone]    [facility] ([address] [place] [address]  [address] [place] [address]  Phone: [phone] Fax: [phone]    Primary Care Provider: Elvie Sun MD    Primary Insurance: MEDICARE  Secondary Insurance: AARP    ASSESSMENT:  Erwin Manriquez Proxies    There are no active Health Care Proxies on file         Advance Directives  Does patient have Advance Directives?: Yes  Primary Contact: Jayshree Silvestre (Spouse)   [phone] (Mobile)         Readmission Root Cause  30 Day Readmission: No    Patient Information  Admitted from[de-identified] Home  Mental Status: Alert, Other (Comment) (oriented)  During Assessment patient was accompanied by: Not accompanied during assessment  Assessment information provided by[de-identified] Patient  Primary Caregiver: Self  Support Systems: Spouse/significant other, Family members  [place] of Residence: [place] [address] do you live in?: [place]  Home entry access options   Select all that apply : Stairs  Number of steps to enter home : 3  Do the steps have railings?: Yes  Type of Current Residence: 2 story home  Upon entering residence, is there a bedroom on the main floor (no further steps)?: No  A bedroom is located on the following floor levels of residence (select all that apply):: 2nd Floor  Upon entering residence, is there a bathroom on the main floor (no further steps)?: Yes (1/2 bath;  full bath on 2nd floor)  Number of steps to 2nd floor from main floor: One Flight  In the last 12 months, was there a time when you were not able to pay the mortgage or rent on time?: No  In the last 12 months, how many places have you lived?: 1  In the last 12 months, was there a time when you did not have a steady place to sleep or slept in a shelter (including now)?: No  Homeless/housing insecurity resource given?: N/A  Living Arrangements: Lives w/ Spouse/significant other  Is patient a ?: No    Activities of Daily Living Prior to Admission  Functional Status: Independent  Completes ADLs independently?: Yes  Ambulates independently?: Yes  Does patient use assisted devices?: No  Does patient currently own DME?: Yes  What DME does the patient currently own?: Straight [place] Speaker, Walker, Shower Chair, Other (Comment) (glucometer)  Does patient have a history of Outpatient Therapy (PT/OT)?: Yes  Does the patient have a history of Short-Term Rehab?: No  Does patient have a history of HHC?: Yes  Does patient currently have [address]?: No         Patient Information Continued  Income Source: Pension/prison  Does patient have prescription coverage?: Yes  Within the past 12 months, you worried that your food would run out before you got the money to buy more : Never true  Within the past 12 months, the food you bought just didn't last and you didn't have money to get more : Never true  Food insecurity resource given?: N/A  Does patient receive dialysis treatments?: No  Does patient have a history of substance abuse?: No  Does patient have a history of Mental Health Diagnosis?: No         Means of Transportation  Means of Transport to Appts[de-identified] Drives Self  In the past 12 months, has lack of transportation kept you from medical appointments or from getting medications?: No  In the past 12 months, has lack of transportation kept you from meetings, work, or from getting things needed for daily living?: No  Was application for public transport provided?: N/A        DISCHARGE DETAILS:    Discharge planning discussed with[de-identified] met w pt at bedside  Freedom of Choice: Yes  Comments - Freedom of Choice: Introduced self and explained role of CM  Discussed HH, DME, STR, outpt tx  Pt feels he will not need any services  He is a retired [employer] and his spouse is a retired RN  Both drive  They are active in their [employer], the [facility] and have valued friendships in the community  Pt describes independence w ADLs, iADLs  Pt for colonoscopy tomorrow r/t c/o rectal bleeding x several weeks    CM contacted family/caregiver?: No- see comments (pt alert and oriented)  Were Treatment Team discharge recommendations reviewed with patient/caregiver?: Yes  Did patient/caregiver verbalize understanding of patient care needs?: Yes  Were patient/caregiver advised of the risks associated with not following Treatment Team discharge recommendations?: Yes    Contacts  Patient Contacts: Thierry Wells (Spouse)   [phone] (Mobile)  Relationship to Patient[de-identified] [address]         Is the patient interested in [place] [address] at discharge?: No    DME Referral Provided  Referral made for DME?: No    Other Referral/Resources/Interventions Provided:  Referral Comments: No referrals made at this time   Pt for colonscopy tomorrow r/t c/o rectal bleeding x several weeks

## 2022-11-19 ENCOUNTER — TREATMENT (OUTPATIENT)
Dept: GASTROENTEROLOGY | Facility: CLINIC | Age: 82
End: 2022-11-19

## 2022-11-19 ENCOUNTER — ANESTHESIA (INPATIENT)
Dept: GASTROENTEROLOGY | Facility: HOSPITAL | Age: 82
End: 2022-11-19

## 2022-11-19 ENCOUNTER — ANESTHESIA EVENT (INPATIENT)
Dept: GASTROENTEROLOGY | Facility: HOSPITAL | Age: 82
End: 2022-11-19

## 2022-11-19 ENCOUNTER — APPOINTMENT (INPATIENT)
Dept: GASTROENTEROLOGY | Facility: HOSPITAL | Age: 82
End: 2022-11-19

## 2022-11-19 VITALS
HEART RATE: 60 BPM | WEIGHT: 183 LBS | DIASTOLIC BLOOD PRESSURE: 72 MMHG | SYSTOLIC BLOOD PRESSURE: 150 MMHG | BODY MASS INDEX: 23.49 KG/M2 | TEMPERATURE: 99.5 F | HEIGHT: 74 IN | RESPIRATION RATE: 18 BRPM | OXYGEN SATURATION: 99 %

## 2022-11-19 DIAGNOSIS — D49.0 ANAL NEOPLASM: Primary | ICD-10-CM

## 2022-11-19 LAB
ANION GAP SERPL CALCULATED.3IONS-SCNC: 10 MMOL/L (ref 4–13)
BASOPHILS # BLD AUTO: 0.04 THOUSANDS/ÂΜL (ref 0–0.1)
BASOPHILS NFR BLD AUTO: 1 % (ref 0–1)
BUN SERPL-MCNC: 8 MG/DL (ref 5–25)
CALCIUM SERPL-MCNC: 9.2 MG/DL (ref 8.3–10.1)
CHLORIDE SERPL-SCNC: 104 MMOL/L (ref 96–108)
CO2 SERPL-SCNC: 27 MMOL/L (ref 21–32)
CREAT SERPL-MCNC: 1.13 MG/DL (ref 0.6–1.3)
EOSINOPHIL # BLD AUTO: 0.08 THOUSAND/ÂΜL (ref 0–0.61)
EOSINOPHIL NFR BLD AUTO: 1 % (ref 0–6)
ERYTHROCYTE [DISTWIDTH] IN BLOOD BY AUTOMATED COUNT: 14.1 % (ref 11.6–15.1)
GFR SERPL CREATININE-BSD FRML MDRD: 61 ML/MIN/1.73SQ M
GLUCOSE SERPL-MCNC: 102 MG/DL (ref 65–140)
GLUCOSE SERPL-MCNC: 122 MG/DL (ref 65–140)
HCT VFR BLD AUTO: 42.3 % (ref 36.5–49.3)
HGB BLD-MCNC: 13.7 G/DL (ref 12–17)
IMM GRANULOCYTES # BLD AUTO: 0 THOUSAND/UL (ref 0–0.2)
IMM GRANULOCYTES NFR BLD AUTO: 0 % (ref 0–2)
INR PPP: 1.97 (ref 0.84–1.19)
LYMPHOCYTES # BLD AUTO: 1.75 THOUSANDS/ÂΜL (ref 0.6–4.47)
LYMPHOCYTES NFR BLD AUTO: 30 % (ref 14–44)
MCH RBC QN AUTO: 27 PG (ref 26.8–34.3)
MCHC RBC AUTO-ENTMCNC: 32.4 G/DL (ref 31.4–37.4)
MCV RBC AUTO: 83 FL (ref 82–98)
MONOCYTES # BLD AUTO: 0.34 THOUSAND/ÂΜL (ref 0.17–1.22)
MONOCYTES NFR BLD AUTO: 6 % (ref 4–12)
NEUTROPHILS # BLD AUTO: 3.59 THOUSANDS/ÂΜL (ref 1.85–7.62)
NEUTS SEG NFR BLD AUTO: 62 % (ref 43–75)
NRBC BLD AUTO-RTO: 0 /100 WBCS
PLATELET # BLD AUTO: 355 THOUSANDS/UL (ref 149–390)
PMV BLD AUTO: 9.5 FL (ref 8.9–12.7)
POTASSIUM SERPL-SCNC: 4.1 MMOL/L (ref 3.5–5.3)
PROTHROMBIN TIME: 22 SECONDS (ref 11.6–14.5)
RBC # BLD AUTO: 5.08 MILLION/UL (ref 3.88–5.62)
SODIUM SERPL-SCNC: 141 MMOL/L (ref 135–147)
WBC # BLD AUTO: 5.8 THOUSAND/UL (ref 4.31–10.16)

## 2022-11-19 PROCEDURE — 0DBK8ZX EXCISION OF ASCENDING COLON, VIA NATURAL OR ARTIFICIAL OPENING ENDOSCOPIC, DIAGNOSTIC: ICD-10-PCS | Performed by: INTERNAL MEDICINE

## 2022-11-19 PROCEDURE — 0DBN8ZX EXCISION OF SIGMOID COLON, VIA NATURAL OR ARTIFICIAL OPENING ENDOSCOPIC, DIAGNOSTIC: ICD-10-PCS | Performed by: INTERNAL MEDICINE

## 2022-11-19 RX ORDER — LIDOCAINE HYDROCHLORIDE 20 MG/ML
INJECTION, SOLUTION EPIDURAL; INFILTRATION; INTRACAUDAL; PERINEURAL AS NEEDED
Status: DISCONTINUED | OUTPATIENT
Start: 2022-11-19 | End: 2022-11-19

## 2022-11-19 RX ORDER — PROPOFOL 10 MG/ML
INJECTION, EMULSION INTRAVENOUS AS NEEDED
Status: DISCONTINUED | OUTPATIENT
Start: 2022-11-19 | End: 2022-11-19

## 2022-11-19 RX ORDER — SODIUM CHLORIDE, SODIUM LACTATE, POTASSIUM CHLORIDE, CALCIUM CHLORIDE 600; 310; 30; 20 MG/100ML; MG/100ML; MG/100ML; MG/100ML
INJECTION, SOLUTION INTRAVENOUS CONTINUOUS PRN
Status: DISCONTINUED | OUTPATIENT
Start: 2022-11-19 | End: 2022-11-19

## 2022-11-19 RX ADMIN — AMLODIPINE BESYLATE 5 MG: 5 TABLET ORAL at 09:25

## 2022-11-19 RX ADMIN — LIDOCAINE HYDROCHLORIDE 75 MG: 20 INJECTION, SOLUTION EPIDURAL; INFILTRATION; INTRACAUDAL; PERINEURAL at 11:38

## 2022-11-19 RX ADMIN — SODIUM CHLORIDE, SODIUM LACTATE, POTASSIUM CHLORIDE, AND CALCIUM CHLORIDE: .6; .31; .03; .02 INJECTION, SOLUTION INTRAVENOUS at 10:14

## 2022-11-19 RX ADMIN — PROPOFOL 55 MG: 10 INJECTION, EMULSION INTRAVENOUS at 11:39

## 2022-11-19 RX ADMIN — PROPOFOL 25 MG: 10 INJECTION, EMULSION INTRAVENOUS at 11:46

## 2022-11-19 RX ADMIN — PROPOFOL 25 MG: 10 INJECTION, EMULSION INTRAVENOUS at 11:43

## 2022-11-19 RX ADMIN — PROPOFOL 55 MG: 10 INJECTION, EMULSION INTRAVENOUS at 11:40

## 2022-11-19 RX ADMIN — PROPOFOL 25 MG: 10 INJECTION, EMULSION INTRAVENOUS at 11:56

## 2022-11-19 RX ADMIN — PROPOFOL 25 MG: 10 INJECTION, EMULSION INTRAVENOUS at 11:52

## 2022-11-19 RX ADMIN — PROPOFOL 25 MG: 10 INJECTION, EMULSION INTRAVENOUS at 11:49

## 2022-11-19 NOTE — ASSESSMENT & PLAN NOTE
Lab Results   Component Value Date    EGFR 61 11/19/2022    EGFR 59 11/18/2022    EGFR 51 11/17/2022    CREATININE 1 13 11/19/2022    CREATININE 1 16 11/18/2022    CREATININE 1 31 (H) 11/17/2022   Baseline creatinine appears to be 1 2 to 1 4  Creatinine 1 31  · Avoid further nephrotoxic agent  · Monitor creatinine with a m   BMP

## 2022-11-19 NOTE — DISCHARGE INSTR - AVS FIRST PAGE
Colonoscopy 11/19    IMPRESSION:  1  Anal nodules, friable, rule out squamous cell neoplasia  2  Pancolonic diverticulosis coli, severe  3  Polyp of the ascending colon status post cold snare polypectomy and retrieval  4   Polyp of the sigmoid colon status post cold snare polypectomy retrieval      RECOMMENDATION:    Repeat colonoscopy in 5 years        Placed a consult for Colorectal surgery to evaluate the anal nodules    Will call the patient in 1-2 weeks with results the polyps    High-fiber diet to include fruits, vegetables, and whole grain foods, daily

## 2022-11-19 NOTE — ANESTHESIA POSTPROCEDURE EVALUATION
Post-Op Assessment Note    CV Status:  Stable  Pain Score: 0    Pain management: adequate     Mental Status:  Arousable and sleepy   Hydration Status:  Euvolemic   PONV Controlled:  Controlled   Airway Patency:  Patent      Post Op Vitals Reviewed: Yes            No notable events documented      BP     Temp      Pulse     Resp      SpO2

## 2022-11-19 NOTE — DISCHARGE SUMMARY
[address] [facility]  Discharge- Tara Storm 1940, [de-identified] y o  male MRN: [id number]  Unit/Bed#: -01 Encounter: [id number]  Primary Care Provider: William Nunez MD   Date and time admitted to hospital: 11/17/2022  2:00 PM    * Hematochezia  Assessment & Plan  Two week history of hematochezia, worsening this morning  Denies clots  CT abdomen pelvis showing linear radiopaque structure in posterior anorectal wall measuring 1 3 cm  Colonic diverticulosis without diverticulitis   Last colonoscopy 03/2020 revealed diverticula, 2 sessile polyps removed  Small internal hemorrhoids with no bleeding  Differential includes diverticulosis versus hemorrhoids versus less likely AVM/UGIB  · Hemoglobin and vital stable  · GI consulted and patient underwent colonoscopy today which showed anal nodule concerning for neoplasm therefore referral to Colorectal surgery ordered  · Hemoglobin remains stable  No further episodes of bleeding noticed    Abnormal CT scan  Assessment & Plan  · Abdominal CT shows - "Linear radiopaque structure in the posterior anorectal wall measuring approximately 1 3 cm image 2/78   This may represent a foreign body or a postsurgical finding    "  · Patient denies any surgical procedures previously  · Patient vehemently deny inserting anything in the rectal vault  He denies eating any solid objects also  · The colonoscopy should help clarify this hopefully      Chronic atrial fibrillation (HCC)  Assessment & Plan  Currently rate controlled  · Continue warfarin    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 (HCC)  Assessment & Plan  · Continue warfarin    SSS (sick sinus syndrome) (HCC)  Assessment & Plan  · Status post permanent pacemaker    Type 2 diabetes mellitus without complication ([email or web address]  Assessment & Plan  · Continue home meds    Stage 2 chronic kidney disease  Assessment & Plan  Lab Results   Component Value Date    EGFR 61 11/19/2022    EGFR 59 11/18/2022    EGFR 51 11/17/2022    CREATININE 1 13 11/19/2022    CREATININE 1 16 11/18/2022    CREATININE 1 31 (H) 11/17/2022   Baseline creatinine appears to be 1 2 to 1 4  Creatinine 1 31  · Avoid further nephrotoxic agent  · Monitor creatinine with a m  BMP    Hypertension associated with diabetes ([facility] )  Assessment & Plan    · Continue home amlodipine 5 mg daily  · Monitor blood pressure      Discharging Physician / Practitioner: Lobito Martinez MD  PCP: Junie Dowell MD  Admission Date:   Admission Orders (From admission, onward)     Ordered        11/18/22 1242  Inpatient Admission  Once            11/17/22 1727  Place in Observation  Once                      Discharge Date: 11/19/22    Medical Problems     Resolved Problems  Date Reviewed: 11/19/2022   None         Consultations During Hospital Stay:  · Gastroenterology    Procedures Performed:   · Colonoscopy    Significant Findings / Test Results:   Colonoscopy    Result Date: 11/19/2022  Impression: 1  Anal nodules, friable, rule out squamous cell neoplasia 2  Pancolonic diverticulosis coli, severe 3  Polyp of the ascending colon status post cold snare polypectomy and retrieval 4  Polyp of the sigmoid colon status post cold snare polypectomy retrieval RECOMMENDATION:  Repeat colonoscopy in 5 years  Will place a consult for Colorectal surgery to evaluate the anal nodules Will call the patient in 1-2 weeks with results the polyps High-fiber diet to include fruits, vegetables, and whole grain foods, daily  DO Fay Ruelas [place], [place]    CT abdomen pelvis with contrast    Result Date: 11/17/2022  Impression: Linear radiopaque structure in the posterior anorectal wall measuring approximately 1 3 cm image 2/78  This may represent a foreign body or a postsurgical finding  Correlate with any previous procedure and/or rectal exam for tenderness  Colonic diverticulosis without diverticulitis  Small hiatal hernia  Gallstones without surrounding inflammation   Workstation performed: [id number]       No Chest XR results available for this patient  Complications:  None    Reason for Admission:  Hematochezia    Hospital Course:     Cristina Polanco is a [de-identified] y o  male patient who originally presented to the hospital on 11/17/2022 due to hematochezia  Patient was seen by Gastroenterology and underwent colonoscopy which showed an a nodule concerning for neoplasia therefore patient referred to Colorectal surgery to be seen in the office for further recommendations  Hemoglobin remains stable and no further episodes of bleeding noticed and remained hemodynamically stable  Patient otherwise medically stable for discharge  Findings discussed with patient and family at bedside and they expressed understanding  Please see above list of diagnoses and related plan for additional information  Condition at Discharge: stable     Discharge Day Visit / Exam:     Subjective:  Patient denies any complaints  Vitals: Blood Pressure: 150/72 (11/19/22 1333)  Pulse: 60 (11/19/22 1225)  Temperature: 99 5 °F (37 5 °C) (11/19/22 1205)  Temp Source: Temporal (11/19/22 1205)  Respirations: 18 (11/19/22 1225)  Height: 6' 2" (188 cm) (11/17/22 1844)  Weight - Scale: 83 kg (183 lb) (11/17/22 1844)  SpO2: 99 % (11/19/22 1225)  Exam:   Physical Exam  Vitals and nursing note reviewed  Constitutional:       General: He is not in acute distress  Appearance: He is well-developed  He is not ill-appearing or diaphoretic  HENT:      Head: Normocephalic and atraumatic  Mouth/Throat:      Mouth: Mucous membranes are moist       Pharynx: Oropharynx is clear  Eyes:      General: No scleral icterus  Conjunctiva/sclera: Conjunctivae normal    Cardiovascular:      Rate and Rhythm: Normal rate and regular rhythm  Heart sounds: No murmur heard  Pulmonary:      Effort: Pulmonary effort is normal  No respiratory distress  Breath sounds: Normal breath sounds     Abdominal:      Palpations: Abdomen is soft  Tenderness: There is no abdominal tenderness  Musculoskeletal:         General: No swelling  Cervical back: Neck supple  Right lower leg: No edema  Left lower leg: No edema  Skin:     General: Skin is warm and dry  Capillary Refill: Capillary refill takes less than 2 seconds  Neurological:      General: No focal deficit present  Mental Status: He is alert and oriented to person, place, and time  Psychiatric:         Mood and Affect: Mood normal          Behavior: Behavior normal          Discussion with Family:  Discussed with patient's wife at bedside and all questions answered    Discharge instructions/Information to patient and family:   See after visit summary for information provided to patient and family  Provisions for Follow-Up Care:  See after visit summary for information related to follow-up care and any pertinent home health orders  Disposition:     Home      Planned Readmission:  No     Discharge Statement:  I spent 45 minutes discharging the patient  This time was spent on the day of discharge  I had direct contact with the patient on the day of discharge  Greater than 50% of the total time was spent examining patient, answering all patient questions, arranging and discussing plan of care with patient as well as directly providing post-discharge instructions  Additional time then spent on discharge activities  Discharge Medications:  See after visit summary for reconciled discharge medications provided to patient and family        ** Please Note: This note has been constructed using a voice recognition system **

## 2022-11-19 NOTE — PLAN OF CARE
Problem: Knowledge Deficit  Goal: Patient/family/caregiver demonstrates understanding of disease process, treatment plan, medications, and discharge instructions  Description: Complete learning assessment and assess knowledge base    Interventions:  - Provide teaching at level of understanding  - Provide teaching via preferred learning methods  Outcome: Progressing     Problem: INFECTION - ADULT  Goal: Absence or prevention of progression during hospitalization  Description: INTERVENTIONS:  - Assess and monitor for signs and symptoms of infection  - Monitor lab/diagnostic results  - Monitor all insertion sites, i e  indwelling lines, tubes, and drains  - Monitor endotracheal if appropriate and nasal secretions for changes in amount and color  - [place] appropriate cooling/warming therapies per order  - Administer medications as ordered  - Instruct and encourage patient and family to use good hand hygiene technique  - Identify and instruct in appropriate isolation precautions for identified infection/condition  Outcome: Progressing

## 2022-11-19 NOTE — ANESTHESIA PREPROCEDURE EVALUATION
Procedure:  COLONOSCOPY    Relevant Problems   CARDIO   (+) Chronic atrial fibrillation (HCC)   (+) 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 (HCC)   (+) Hypertension associated with diabetes (HCC)   (+) SSS (sick sinus syndrome) (HCC)      ENDO   (+) Type 2 diabetes mellitus without complication (HCC)      /RENAL   (+) Chronic kidney disease-mineral and bone disorder   (+) Stage 2 chronic kidney disease   (+) Stage 3a chronic kidney disease (HCC)      Chronic kidney disease    SSS (sick sinus syndrome) (HCC)    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)    Kidney stone    Diverticulosis    History of cardiac cath    Colon polyp        Physical Exam    Airway    Mallampati score: II  TM Distance: >3 FB  Neck ROM: full     Dental       Cardiovascular  Cardiovascular exam normal    Pulmonary  Pulmonary exam normal     Other Findings        Anesthesia Plan  ASA Score- 2     Anesthesia Type- IV sedation with anesthesia with ASA Monitors  Additional Monitors:   Airway Plan:           Plan Factors-Exercise tolerance (METS): >4 METS  Chart reviewed  EKG reviewed  Imaging results reviewed  Existing labs reviewed  Patient summary reviewed  Induction- intravenous  Postoperative Plan-     Informed Consent- Anesthetic plan and risks discussed with patient  I personally reviewed this patient with the CRNA  Discussed and agreed on the Anesthesia Plan with the CRNA  Nahum Mccann

## 2022-11-19 NOTE — PLAN OF CARE
Problem: PAIN - ADULT  Goal: Verbalizes/displays adequate comfort level or baseline comfort level  Description: Interventions:  - Encourage patient to monitor pain and request assistance  - Assess pain using appropriate pain scale  - Administer analgesics based on type and severity of pain and evaluate response  - Implement non-pharmacological measures as appropriate and evaluate response  - Consider cultural and social influences on pain and pain management  - Notify physician/advanced practitioner if interventions unsuccessful or patient reports new pain  Outcome: Progressing     Problem: INFECTION - ADULT  Goal: Absence or prevention of progression during hospitalization  Description: INTERVENTIONS:  - Assess and monitor for signs and symptoms of infection  - Monitor lab/diagnostic results  - Monitor all insertion sites, i e  indwelling lines, tubes, and drains  - Monitor endotracheal if appropriate and nasal secretions for changes in amount and color  - [place] appropriate cooling/warming therapies per order  - Administer medications as ordered  - Instruct and encourage patient and family to use good hand hygiene technique  - Identify and instruct in appropriate isolation precautions for identified infection/condition  Outcome: Progressing     Problem: SAFETY ADULT  Goal: Patient will remain free of falls  Description: INTERVENTIONS:  - Educate patient/family on patient safety including physical limitations  - Instruct patient to call for assistance with activity   - Consult OT/PT to assist with strengthening/mobility   - Keep Call bell within reach  - Keep bed low and locked with side rails adjusted as appropriate  - Keep care items and personal belongings within reach  - Initiate and maintain comfort rounds  - Make Fall Risk Sign visible to staff  - Offer Toileting every  Hours, in advance of need  - Initiate/Maintain alarm  - Obtain necessary fall risk management equipment:   - Apply yellow socks and bracelet for high fall risk patients  - Consider moving patient to room near nurses station  Outcome: Progressing  Goal: Maintain or return to baseline ADL function  Description: INTERVENTIONS:  -  Assess patient's ability to carry out ADLs; assess patient's baseline for ADL function and identify physical deficits which impact ability to perform ADLs (bathing, care of mouth/teeth, toileting, grooming, dressing, etc )  - Assess/evaluate cause of self-care deficits   - Assess range of motion  - Assess patient's mobility; develop plan if impaired  - Assess patient's need for assistive devices and provide as appropriate  - Encourage maximum independence but intervene and supervise when necessary  - Involve family in performance of ADLs  - Assess for home care needs following discharge   - Consider OT consult to assist with ADL evaluation and planning for discharge  - Provide patient education as appropriate  Outcome: Progressing  Goal: Maintains/Returns to pre admission functional level  Description: INTERVENTIONS:  - Perform BMAT or MOVE assessment daily    - Set and communicate daily mobility goal to care team and patient/family/caregiver  - Collaborate with rehabilitation services on mobility goals if consulted  - Perform Range of Motion  times a day  - Reposition patient every  hours    - Dangle patient  times a day  - Stand patient  times a day  - Ambulate patient  times a day  - Out of bed to chair  times a day   - Out of bed for meals  times a day  - Out of bed for toileting  - Record patient progress and toleration of activity level   Outcome: Progressing     Problem: DISCHARGE PLANNING  Goal: Discharge to home or other facility with appropriate resources  Description: INTERVENTIONS:  - Identify barriers to discharge w/patient and caregiver  - Arrange for needed discharge resources and transportation as appropriate  - Identify discharge learning needs (meds, wound care, etc )  - Arrange for interpretive services to assist at discharge as needed  - Refer to Case Management Department for coordinating discharge planning if the patient needs post-hospital services based on physician/advanced practitioner order or complex needs related to functional status, cognitive ability, or social support system  Outcome: Progressing     Problem: Knowledge Deficit  Goal: Patient/family/caregiver demonstrates understanding of disease process, treatment plan, medications, and discharge instructions  Description: Complete learning assessment and assess knowledge base    Interventions:  - Provide teaching at level of understanding  - Provide teaching via preferred learning methods  Outcome: Progressing     Problem: Potential for Falls  Goal: Patient will remain free of falls  Description: INTERVENTIONS:  - Educate patient/family on patient safety including physical limitations  - Instruct patient to call for assistance with activity   - Consult OT/PT to assist with strengthening/mobility   - Keep Call bell within reach  - Keep bed low and locked with side rails adjusted as appropriate  - Keep care items and personal belongings within reach  - Initiate and maintain comfort rounds  - Make Fall Risk Sign visible to staff  - Offer Toileting every  Hours, in advance of need  - Initiate/Maintain alarm  - Obtain necessary fall risk management equipment  - Apply yellow socks and bracelet for high fall risk patients  - Consider moving patient to room near nurses station  Outcome: Progressing     Problem: Prexisting or High Potential for Compromised Skin Integrity  Goal: Skin integrity is maintained or improved  Description: INTERVENTIONS:  - Identify patients at risk for skin breakdown  - Assess and monitor skin integrity  - Assess and monitor nutrition and hydration status  - Monitor labs   - Assess for incontinence   - Turn and reposition patient  - Assist with mobility/ambulation  - Relieve pressure over bony prominences  - Avoid friction and shearing  - Provide appropriate hygiene as needed including keeping skin clean and dry  - Evaluate need for skin moisturizer/barrier cream  - Collaborate with interdisciplinary team   - Patient/family teaching  - Consider wound care consult   Outcome: Progressing

## 2022-11-19 NOTE — PLAN OF CARE
Problem: PAIN - ADULT  Goal: Verbalizes/displays adequate comfort level or baseline comfort level  Description: Interventions:  - Encourage patient to monitor pain and request assistance  - Assess pain using appropriate pain scale  - Administer analgesics based on type and severity of pain and evaluate response  - Implement non-pharmacological measures as appropriate and evaluate response  - Consider cultural and social influences on pain and pain management  - Notify physician/advanced practitioner if interventions unsuccessful or patient reports new pain  11/19/2022 1439 by Italia Trujillo RN  Outcome: Adequate for Discharge  11/19/2022 1110 by Italia Trujillo RN  Outcome: Progressing     Problem: INFECTION - ADULT  Goal: Absence or prevention of progression during hospitalization  Description: INTERVENTIONS:  - Assess and monitor for signs and symptoms of infection  - Monitor lab/diagnostic results  - Monitor all insertion sites, i e  indwelling lines, tubes, and drains  - Monitor endotracheal if appropriate and nasal secretions for changes in amount and color  - Dewey appropriate cooling/warming therapies per order  - Administer medications as ordered  - Instruct and encourage patient and family to use good hand hygiene technique  - Identify and instruct in appropriate isolation precautions for identified infection/condition  11/19/2022 1439 by Italia Trujillo RN  Outcome: Adequate for Discharge  11/19/2022 1110 by Italia Trujillo RN  Outcome: Progressing     Problem: SAFETY ADULT  Goal: Patient will remain free of falls  Description: INTERVENTIONS:  - Educate patient/family on patient safety including physical limitations  - Instruct patient to call for assistance with activity   - Consult OT/PT to assist with strengthening/mobility   - Keep Call bell within reach  - Keep bed low and locked with side rails adjusted as appropriate  - Keep care items and personal belongings within reach  - Initiate and maintain comfort rounds  - Make Fall Risk Sign visible to staff  - Offer Toileting every  Hours, in advance of need  - Initiate/Maintain alarm  - Obtain necessary fall risk management equipment:   - Apply yellow socks and bracelet for high fall risk patients  - Consider moving patient to room near nurses station  11/19/2022 1439 by Gia Malave RN  Outcome: Adequate for Discharge  11/19/2022 1110 by Gia Malave RN  Outcome: Progressing  Goal: Maintain or return to baseline ADL function  Description: INTERVENTIONS:  -  Assess patient's ability to carry out ADLs; assess patient's baseline for ADL function and identify physical deficits which impact ability to perform ADLs (bathing, care of mouth/teeth, toileting, grooming, dressing, etc )  - Assess/evaluate cause of self-care deficits   - Assess range of motion  - Assess patient's mobility; develop plan if impaired  - Assess patient's need for assistive devices and provide as appropriate  - Encourage maximum independence but intervene and supervise when necessary  - Involve family in performance of ADLs  - Assess for home care needs following discharge   - Consider OT consult to assist with ADL evaluation and planning for discharge  - Provide patient education as appropriate  11/19/2022 1439 by Gia Malave RN  Outcome: Adequate for Discharge  11/19/2022 1110 by Gia Malave RN  Outcome: Progressing  Goal: Maintains/Returns to pre admission functional level  Description: INTERVENTIONS:  - Perform BMAT or MOVE assessment daily    - Set and communicate daily mobility goal to care team and patient/family/caregiver  - Collaborate with rehabilitation services on mobility goals if consulted  - Perform Range of Motion  times a day  - Reposition patient every  hours    - Dangle patient  times a day  - Stand patient  times a day  - Ambulate patient  times a day  - Out of bed to chair  times a day   - Out of bed for meals  times a day  - Out of bed for toileting  - Record patient progress and toleration of activity level   11/19/2022 1439 by Juan F Taylor RN  Outcome: Adequate for Discharge  11/19/2022 1110 by Juan F Taylor RN  Outcome: Progressing     Problem: DISCHARGE PLANNING  Goal: Discharge to home or other facility with appropriate resources  Description: INTERVENTIONS:  - Identify barriers to discharge w/patient and caregiver  - Arrange for needed discharge resources and transportation as appropriate  - Identify discharge learning needs (meds, wound care, etc )  - Arrange for interpretive services to assist at discharge as needed  - Refer to Case Management Department for coordinating discharge planning if the patient needs post-hospital services based on physician/advanced practitioner order or complex needs related to functional status, cognitive ability, or social support system  11/19/2022 1439 by Juan F Taylor RN  Outcome: Adequate for Discharge  11/19/2022 1110 by Juan F Taylor RN  Outcome: Progressing     Problem: Knowledge Deficit  Goal: Patient/family/caregiver demonstrates understanding of disease process, treatment plan, medications, and discharge instructions  Description: Complete learning assessment and assess knowledge base    Interventions:  - Provide teaching at level of understanding  - Provide teaching via preferred learning methods  11/19/2022 1439 by Juan F Taylor RN  Outcome: Adequate for Discharge  11/19/2022 1110 by Juan F Taylor RN  Outcome: Progressing     Problem: Potential for Falls  Goal: Patient will remain free of falls  Description: INTERVENTIONS:  - Educate patient/family on patient safety including physical limitations  - Instruct patient to call for assistance with activity   - Consult OT/PT to assist with strengthening/mobility   - Keep Call bell within reach  - Keep bed low and locked with side rails adjusted as appropriate  - Keep care items and personal belongings within reach  - Initiate and maintain comfort rounds  - Make Fall Risk Sign visible to staff  - Offer Toileting every  Hours, in advance of need  - Initiate/Maintain alarm  - Obtain necessary fall risk management equipment  - Apply yellow socks and bracelet for high fall risk patients  - Consider moving patient to room near nurses station  11/19/2022 1439 by Ashvin Crawley RN  Outcome: Adequate for Discharge  11/19/2022 1110 by Ashvin Crawley RN  Outcome: Progressing     Problem: Prexisting or High Potential for Compromised Skin Integrity  Goal: Skin integrity is maintained or improved  Description: INTERVENTIONS:  - Identify patients at risk for skin breakdown  - Assess and monitor skin integrity  - Assess and monitor nutrition and hydration status  - Monitor labs   - Assess for incontinence   - Turn and reposition patient  - Assist with mobility/ambulation  - Relieve pressure over bony prominences  - Avoid friction and shearing  - Provide appropriate hygiene as needed including keeping skin clean and dry  - Evaluate need for skin moisturizer/barrier cream  - Collaborate with interdisciplinary team   - Patient/family teaching  - Consider wound care consult   11/19/2022 1439 by Ashvin Crawley RN  Outcome: Adequate for Discharge  11/19/2022 1110 by Ashvin Crawley RN  Outcome: Progressing

## 2022-11-19 NOTE — ASSESSMENT & PLAN NOTE
Two week history of hematochezia, worsening this morning  Denies clots  CT abdomen pelvis showing linear radiopaque structure in posterior anorectal wall measuring 1 3 cm  Colonic diverticulosis without diverticulitis   Last colonoscopy 03/2020 revealed diverticula, 2 sessile polyps removed  Small internal hemorrhoids with no bleeding  Differential includes diverticulosis versus hemorrhoids versus less likely AVM/UGIB  · Hemoglobin and vital stable  · GI consulted and patient underwent colonoscopy today which showed anal nodule concerning for neoplasm therefore referral to Colorectal surgery ordered  · Hemoglobin remains stable    No further episodes of bleeding noticed

## 2022-11-21 ENCOUNTER — TELEPHONE (OUTPATIENT)
Dept: INTERNAL MEDICINE CLINIC | Facility: CLINIC | Age: 82
End: 2022-11-21

## 2022-11-21 ENCOUNTER — TRANSITIONAL CARE MANAGEMENT (OUTPATIENT)
Dept: INTERNAL MEDICINE CLINIC | Facility: CLINIC | Age: 82
End: 2022-11-21

## 2022-11-21 NOTE — TELEPHONE ENCOUNTER
PT  WAS    IN  [facility]  11/17   HAD     RECTAL BLEEDING WANTED   DR STEPHEN   TO  KNOW  THIS      REFERRED  HIM  TO   COLON RECTAL   DR Brenden Pool

## 2022-11-22 ENCOUNTER — RA CDI HCC (OUTPATIENT)
Dept: OTHER | Facility: HOSPITAL | Age: 82
End: 2022-11-22

## 2022-11-22 NOTE — PROGRESS NOTES
[facility]  coding opportunities        E11 22, E11 69, E11 42, E11 [id number] and I49 5  Chart Reviewed number of suggestions sent to Provider: 5     Patients Insurance     Medicare Insurance: Medicare

## 2022-11-29 ENCOUNTER — OFFICE VISIT (OUTPATIENT)
Dept: INTERNAL MEDICINE CLINIC | Facility: CLINIC | Age: 82
End: 2022-11-29

## 2022-11-29 VITALS
HEART RATE: 62 BPM | DIASTOLIC BLOOD PRESSURE: 72 MMHG | HEIGHT: 74 IN | WEIGHT: 186 LBS | OXYGEN SATURATION: 99 % | BODY MASS INDEX: 23.87 KG/M2 | SYSTOLIC BLOOD PRESSURE: 118 MMHG

## 2022-11-29 DIAGNOSIS — E11.59 HYPERTENSION ASSOCIATED WITH DIABETES (HCC): ICD-10-CM

## 2022-11-29 DIAGNOSIS — I15.2 HYPERTENSION ASSOCIATED WITH DIABETES (HCC): ICD-10-CM

## 2022-11-29 DIAGNOSIS — Z23 ENCOUNTER FOR IMMUNIZATION: ICD-10-CM

## 2022-11-29 NOTE — PATIENT INSTRUCTIONS
Patient with the above issues  Continue to treat back pain with Tylenol 1000 mg 3 times a day on a regular basis  You cannot use Aleve, or Advil, or similar nonsteroidal anti-inflammatory drugs due to the bleeding     The CT scan of the abdomen took a look at the spine and no abnormalities were noted  Arthritic changes of the hips were noted  Further recommendations for pictures -"medical imaging "- of the spine will depend to a certain extent on the pathology of the anal nodules

## 2022-11-29 NOTE — PROGRESS NOTES
Diabetic Foot Exam    Patient's shoes and socks removed  Right Foot/Ankle   Right Foot Inspection  Skin Exam: skin intact  Toe Exam: right toe deformity  Sensory   Monofilament testing: intact    Vascular  The right DP pulse is 1+  The right PT pulse is 0  Left Foot/Ankle  Left Foot Inspection  Skin Exam: skin intact  Toe Exam: left toe deformity  Sensory   Monofilament testing: intact    Vascular  The left DP pulse is 1+  The left PT pulse is 0  Assign Risk Category  Deformity present  No loss of protective sensation  Weak pulses  Risk: 1  Assessment & Plan     1  Hypertension associated with diabetes ([address] )    2  Encounter for immunization         Subjective     Transitional Care Management Review:   Anita Boyd is a [de-identified] y o  male here for TCM follow up  During the TCM phone call patient stated:  TCM Call     Date and time call was made  11/21/2022  2:10 PM    Hospital care reviewed  Records reviewed    Patient was hospitialized at  [facility]    Date of Admission  11/18/22    Date of discharge  11/19/22    Diagnosis  Hematochezia,sss,chronic afib    Disposition  Home    Were the patients medications reviewed and updated  Yes    Current Symptoms  None      TCM Call     Post hospital issues  None    Scheduled for follow up?   Yes    Did you obtain your prescribed medications  No    Why were you unable to obtain your medications  medication same    Do you need help managing your prescriptions or medications  No    Is transportation to your appointment needed  No    I have advised the patient to call PCP with any new or worsening symptoms  CynthiaGregorLPN    Living Arrangements  Spouse or Significiant other    Support System  Spouse    Do you have social support  Yes, as much as I need    Are you recieving any outpatient services  No    Are you recieving home care services  No    Are you using any community resources  No    Current waiver services  No    Have you fallen in the last 12 months  No    Interperter language line needed  No    Counseling  Family    Counseling topics  patient and family education        Hospitalized for GI bleed  He had red blood per rectum  Did not need a blood transfusion   Hemoglobin hematocrit really did not drop at all  Colonoscopy done identify 3 anal nodules and 1 sigmoid polyp  Pathology is still pending   Extensive sigmoid diverticulosis was noted but there was no bleeding from those lesions  The colonoscopy is to, Dr Luis Antonio Pineda, describes the anal lesions as bleeding with "minimal "touch  Continues to have some slight hematochezia  Also, complaint of back pain felt lumbar region exacerbated by twisting motions  It is quite severe  Due to bleeding he cannot take NSAIDs  CT scan of the abdomen pelvis was done as a part of the workup with GI bleeding and mentions hip osteoarthritic changes but nothing about the spine  Diabetic, not well controlled at all  He follows with [facility] endocrinology  His last hemoglobin A1c done  was 8 9%  I had 1 in to add glimepiride 2 mg daily but he did not do so because he insists he was told that glimepiride is bad  Kidneys     He has chronic kidney disease stage 2 and follows with nephrology       No cigarettes, no alcohol, no illicit drugs    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   and lives with his wife  History of sick sinus syndrome  He has a pacemaker  This was placed in 2016  A complication of care was 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  He fibrillating so is on anticoagulation anyway  Right now on Coumadin because he cannot afford Eliquis  Globally feels well  Mom  from stroke, father  from heart related illness  Both a relatively young ages  colonoscopy in 2020 with wto small adenomatous appearing polyps  The patient had bilateral cataract surgery  He had kidney stones  He has diverticulosis    A recent EKG done at [facility] shows 100% pacing  Review of Systems   Gastrointestinal: Positive for anal bleeding and blood in stool  Musculoskeletal: Positive for back pain  All other systems reviewed and are negative  Objective     /72 (BP Location: Left arm)   Pulse 62   Ht 6' 2" (1 88 m)   Wt 84 4 kg (186 lb)   SpO2 99%   BMI 23 88 kg/m²      Physical Exam  Cardiovascular:      Rate and Rhythm: Normal rate  Pulses: Pulses are weak  Dorsalis pedis pulses are 1+ on the right side and 1+ on the left side  Posterior tibial pulses are 0 on the right side and 0 on the left side  Neurological:      General: No focal deficit present  Mental Status: He is alert     Psychiatric:         Mood and Affect: Mood normal        Essence Easley MD

## 2022-12-07 ENCOUNTER — TELEPHONE (OUTPATIENT)
Dept: GASTROENTEROLOGY | Facility: CLINIC | Age: 82
End: 2022-12-07

## 2022-12-07 NOTE — TELEPHONE ENCOUNTER
Called pt but spoke to wife, heard pt in background and wife said pt is not available  Gave results  Wife voiced understanding and will relay to pt

## 2022-12-07 NOTE — TELEPHONE ENCOUNTER
----- Message from Sandee Ross DO sent at 12/7/2022  7:15 AM EST -----  Please call the patient with the polyp results  1 polyp was a precancerous polyp and completely removed  The other polyp was a benign polyp    This patient is not due for any additional colonoscopies because of his age

## 2022-12-08 ENCOUNTER — TELEPHONE (OUTPATIENT)
Dept: NEPHROLOGY | Facility: CLINIC | Age: 82
End: 2022-12-08

## 2022-12-08 NOTE — TELEPHONE ENCOUNTER
Called spoke with patient's spouse Tatiana Saini, advised I was calling to schedule f/u for patient from Feb recall list  Patient's spouse Nubia Will refuses to schedule at this time, patient is getting a biopsy of the colon done on 12/21 and does not know at the time what will happen,she just wants to make Dr Ruiz aware and will call office to schedule f/u appointment after patient's surgery

## 2022-12-15 RX ORDER — ACETAMINOPHEN 500 MG
500 TABLET ORAL 3 TIMES DAILY
COMMUNITY

## 2022-12-15 NOTE — PRE-PROCEDURE INSTRUCTIONS
Pre-Surgery Instructions:   Medication Instructions   • acetaminophen (TYLENOL) 500 mg tablet Take day of surgery  • amLODIPine (NORVASC) 5 mg tablet Take day of surgery  • Cholecalciferol 25 MCG (1000 UT) tablet Stop taking 7 days prior to surgery  • Multiple Vitamins-Minerals (MULTIVITAMIN ADULT PO) Stop taking 7 days prior to surgery  • oxybutynin (DITROPAN-XL) 10 MG 24 hr tablet Take night before surgery   • repaglinide (PRANDIN) 1 mg tablet Take night before surgery   • simvastatin (ZOCOR) 20 mg tablet Take night before surgery   • warfarin (COUMADIN) 7 5 mg tablet Instructions provided by MD    Pt denies fever, sob, sore throat and cough  Pt verbalized understanding of shower and med instructions  Pt instructed to stop nsaids and supplements prior to surgery  Pt did receive coumadin hold instructions from cardiologist   Last dose is 12/15/22

## 2022-12-16 ENCOUNTER — ANESTHESIA EVENT (OUTPATIENT)
Dept: PERIOP | Facility: HOSPITAL | Age: 82
End: 2022-12-16

## 2022-12-21 ENCOUNTER — ANESTHESIA (OUTPATIENT)
Dept: PERIOP | Facility: HOSPITAL | Age: 82
End: 2022-12-21

## 2022-12-21 ENCOUNTER — HOSPITAL ENCOUNTER (OUTPATIENT)
Facility: HOSPITAL | Age: 82
Setting detail: OUTPATIENT SURGERY
Discharge: HOME | End: 2022-12-21
Attending: COLON & RECTAL SURGERY | Admitting: COLON & RECTAL SURGERY

## 2022-12-21 VITALS
DIASTOLIC BLOOD PRESSURE: 88 MMHG | SYSTOLIC BLOOD PRESSURE: 158 MMHG | OXYGEN SATURATION: 98 % | RESPIRATION RATE: 16 BRPM | TEMPERATURE: 96.3 F | BODY MASS INDEX: 23.49 KG/M2 | WEIGHT: 183 LBS | HEART RATE: 60 BPM | HEIGHT: 74 IN

## 2022-12-21 DIAGNOSIS — K62.89 NODULE OF ANUS: Primary | ICD-10-CM

## 2022-12-21 PROBLEM — I82.A19: Status: ACTIVE | Noted: 2018-07-05

## 2022-12-21 LAB
GLUCOSE SERPL-MCNC: 128 MG/DL (ref 65–140)
GLUCOSE SERPL-MCNC: 134 MG/DL (ref 65–140)

## 2022-12-21 RX ORDER — LABETALOL HYDROCHLORIDE 5 MG/ML
10 INJECTION, SOLUTION INTRAVENOUS
Status: DISCONTINUED | OUTPATIENT
Start: 2022-12-21 | End: 2022-12-21 | Stop reason: HOSPADM

## 2022-12-21 RX ORDER — OXYCODONE HYDROCHLORIDE 5 MG/1
5 TABLET ORAL EVERY 4 HOURS PRN
Qty: 20 TABLET | Refills: 0 | Status: SHIPPED | OUTPATIENT
Start: 2022-12-21 | End: 2022-12-31

## 2022-12-21 RX ORDER — PROPOFOL 10 MG/ML
INJECTION, EMULSION INTRAVENOUS AS NEEDED
Status: DISCONTINUED | OUTPATIENT
Start: 2022-12-21 | End: 2022-12-21

## 2022-12-21 RX ORDER — MAGNESIUM HYDROXIDE 1200 MG/15ML
LIQUID ORAL AS NEEDED
Status: DISCONTINUED | OUTPATIENT
Start: 2022-12-21 | End: 2022-12-21 | Stop reason: HOSPADM

## 2022-12-21 RX ORDER — SODIUM CHLORIDE, SODIUM LACTATE, POTASSIUM CHLORIDE, CALCIUM CHLORIDE 600; 310; 30; 20 MG/100ML; MG/100ML; MG/100ML; MG/100ML
125 INJECTION, SOLUTION INTRAVENOUS CONTINUOUS
Status: DISCONTINUED | OUTPATIENT
Start: 2022-12-21 | End: 2022-12-21 | Stop reason: HOSPADM

## 2022-12-21 RX ORDER — METOCLOPRAMIDE HYDROCHLORIDE 5 MG/ML
5 INJECTION INTRAMUSCULAR; INTRAVENOUS ONCE AS NEEDED
Status: DISCONTINUED | OUTPATIENT
Start: 2022-12-21 | End: 2022-12-21 | Stop reason: HOSPADM

## 2022-12-21 RX ORDER — ONDANSETRON 2 MG/ML
INJECTION INTRAMUSCULAR; INTRAVENOUS AS NEEDED
Status: DISCONTINUED | OUTPATIENT
Start: 2022-12-21 | End: 2022-12-21

## 2022-12-21 RX ORDER — FENTANYL CITRATE 50 UG/ML
INJECTION, SOLUTION INTRAMUSCULAR; INTRAVENOUS AS NEEDED
Status: DISCONTINUED | OUTPATIENT
Start: 2022-12-21 | End: 2022-12-21

## 2022-12-21 RX ORDER — HYDROMORPHONE HCL/PF 1 MG/ML
0.5 SYRINGE (ML) INJECTION
Status: DISCONTINUED | OUTPATIENT
Start: 2022-12-21 | End: 2022-12-21 | Stop reason: HOSPADM

## 2022-12-21 RX ORDER — FENTANYL CITRATE/PF 50 MCG/ML
25 SYRINGE (ML) INJECTION
Status: DISCONTINUED | OUTPATIENT
Start: 2022-12-21 | End: 2022-12-21 | Stop reason: HOSPADM

## 2022-12-21 RX ORDER — BUPIVACAINE HYDROCHLORIDE AND EPINEPHRINE 2.5; 5 MG/ML; UG/ML
INJECTION, SOLUTION EPIDURAL; INFILTRATION; INTRACAUDAL; PERINEURAL AS NEEDED
Status: DISCONTINUED | OUTPATIENT
Start: 2022-12-21 | End: 2022-12-21 | Stop reason: HOSPADM

## 2022-12-21 RX ORDER — LIDOCAINE HYDROCHLORIDE 20 MG/ML
INJECTION, SOLUTION EPIDURAL; INFILTRATION; INTRACAUDAL; PERINEURAL AS NEEDED
Status: DISCONTINUED | OUTPATIENT
Start: 2022-12-21 | End: 2022-12-21

## 2022-12-21 RX ORDER — OXYCODONE HYDROCHLORIDE 5 MG/1
5 TABLET ORAL EVERY 4 HOURS PRN
Status: DISCONTINUED | OUTPATIENT
Start: 2022-12-21 | End: 2022-12-21 | Stop reason: HOSPADM

## 2022-12-21 RX ORDER — LIDOCAINE HYDROCHLORIDE 10 MG/ML
0.5 INJECTION, SOLUTION EPIDURAL; INFILTRATION; INTRACAUDAL; PERINEURAL ONCE AS NEEDED
Status: DISCONTINUED | OUTPATIENT
Start: 2022-12-21 | End: 2022-12-21 | Stop reason: HOSPADM

## 2022-12-21 RX ORDER — DEXAMETHASONE SODIUM PHOSPHATE 10 MG/ML
INJECTION, SOLUTION INTRAMUSCULAR; INTRAVENOUS AS NEEDED
Status: DISCONTINUED | OUTPATIENT
Start: 2022-12-21 | End: 2022-12-21

## 2022-12-21 RX ORDER — LABETALOL HYDROCHLORIDE 5 MG/ML
INJECTION, SOLUTION INTRAVENOUS AS NEEDED
Status: DISCONTINUED | OUTPATIENT
Start: 2022-12-21 | End: 2022-12-21

## 2022-12-21 RX ORDER — FENTANYL CITRATE/PF 50 MCG/ML
50 SYRINGE (ML) INJECTION
Status: DISCONTINUED | OUTPATIENT
Start: 2022-12-21 | End: 2022-12-21 | Stop reason: HOSPADM

## 2022-12-21 RX ORDER — ONDANSETRON 2 MG/ML
4 INJECTION INTRAMUSCULAR; INTRAVENOUS ONCE AS NEEDED
Status: DISCONTINUED | OUTPATIENT
Start: 2022-12-21 | End: 2022-12-21 | Stop reason: HOSPADM

## 2022-12-21 RX ORDER — SUCCINYLCHOLINE/SOD CL,ISO/PF 100 MG/5ML
SYRINGE (ML) INTRAVENOUS AS NEEDED
Status: DISCONTINUED | OUTPATIENT
Start: 2022-12-21 | End: 2022-12-21

## 2022-12-21 RX ADMIN — ONDANSETRON 4 MG: 2 INJECTION INTRAMUSCULAR; INTRAVENOUS at 09:37

## 2022-12-21 RX ADMIN — FENTANYL CITRATE 25 MCG: 50 INJECTION INTRAMUSCULAR; INTRAVENOUS at 09:44

## 2022-12-21 RX ADMIN — LABETALOL HYDROCHLORIDE 5 MG: 5 INJECTION, SOLUTION INTRAVENOUS at 09:47

## 2022-12-21 RX ADMIN — FENTANYL CITRATE 50 MCG: 50 INJECTION INTRAMUSCULAR; INTRAVENOUS at 09:47

## 2022-12-21 RX ADMIN — Medication 100 MG: at 09:37

## 2022-12-21 RX ADMIN — SODIUM CHLORIDE, SODIUM LACTATE, POTASSIUM CHLORIDE, AND CALCIUM CHLORIDE: .6; .31; .03; .02 INJECTION, SOLUTION INTRAVENOUS at 09:31

## 2022-12-21 RX ADMIN — FENTANYL CITRATE 25 MCG: 50 INJECTION INTRAMUSCULAR; INTRAVENOUS at 09:37

## 2022-12-21 RX ADMIN — DEXAMETHASONE SODIUM PHOSPHATE 10 MG: 10 INJECTION, SOLUTION INTRAMUSCULAR; INTRAVENOUS at 09:37

## 2022-12-21 RX ADMIN — LIDOCAINE HYDROCHLORIDE 100 MG: 20 INJECTION, SOLUTION EPIDURAL; INFILTRATION; INTRACAUDAL; PERINEURAL at 09:37

## 2022-12-21 RX ADMIN — PROPOFOL 200 MG: 10 INJECTION, EMULSION INTRAVENOUS at 09:37

## 2022-12-21 NOTE — ANESTHESIA POSTPROCEDURE EVALUATION
Post-Op Assessment Note    CV Status:  Stable  Pain Score: 0    Pain management: adequate     Mental Status:  Alert and awake   Hydration Status:  Stable   PONV Controlled:  None   Airway Patency:  Patent      Post Op Vitals Reviewed: Yes      Staff: CRNA   Comments: AAOx3, SV Nonobstructed, VSS        No notable events documented      BP  165/80   Temp  97 1   Pulse 65   Resp 20   SpO2 100

## 2022-12-21 NOTE — ANESTHESIA PREPROCEDURE EVALUATION
Procedure:  EXAM UNDER ANESTHESIA (EUA) biopsy anal lesion (Anus)    Relevant Problems   ANESTHESIA (within normal limits)      CARDIO   (+) Chronic atrial fibrillation (HCC)   (+) 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)   (+) Hyperlipidemia   (+) Hypertension associated with diabetes (HCC)   (+) Pacemaker   (+) SSS (sick sinus syndrome) (HCC)      ENDO   (+) Type 2 diabetes mellitus without complication (HCC)      /RENAL   (+) Chronic kidney disease-mineral and bone disorder   (+) Stage 2 chronic kidney disease   (+) Stage 3a chronic kidney disease (HCC)      PULMONARY (within normal limits)        Physical Exam    Airway    Mallampati score: I  TM Distance: >3 FB  Neck ROM: full     Dental       Cardiovascular      Pulmonary      Other Findings        Anesthesia Plan  ASA Score- 3     Anesthesia Type- general with ASA Monitors  Additional Monitors:   Airway Plan: ETT  Plan Factors-Exercise tolerance (METS): >4 METS  Chart reviewed  EKG reviewed  Existing labs reviewed  Patient summary reviewed  Patient is not a current smoker  Induction- intravenous  Postoperative Plan-     Informed Consent- Anesthetic plan and risks discussed with patient  I personally reviewed this patient with the CRNA  Discussed and agreed on the Anesthesia Plan with the CRNA  Mary Beth Raymond

## 2022-12-21 NOTE — DISCHARGE INSTR - AVS FIRST PAGE
May shower or tub bathe beginning today  Ensure you are not getting constipated using Metamucil 1 tablespoon 1-2 times daily with plenty of hydration  Pain medication as prescribed for pain may be combined with Tylenol  May use ibuprofen as well     Call for worsening pain, heavy bleeding, inability to urinate or fever greater than 101 5  4 weeks follow-up Dr Pugh Remedies [phone]   Okay to resume Coumadin tomorrow

## 2022-12-21 NOTE — H&P
History and Physical   Colon and Rectal Surgery   Maximus Mckeon [de-identified] y o  male MRN: [id number]  Unit/Bed#: OR [place] Encounter: [id number]  12/21/22   @NOW    No chief complaint on file  History of Present Illness   HPI:  Maximus Mckeon is a [de-identified] y o  male who presents for evaluation and treatment of anal lesion      Historical Information   Past Medical History:   Diagnosis Date   • Chronic kidney disease    • Colon polyp    • 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)    • Diabetes mellitus ([facility] )    • Diverticulosis    • History of cardiac cath    • Hyperlipidemia    • Hypertension    • Kidney stone    • SSS (sick sinus syndrome) ([facility] )      Past Surgical History:   Procedure Laterality Date   • ANAL MANOMETRY  10/06/2015   • CARDIAC PACEMAKER PLACEMENT     • CATARACT EXTRACTION     • COLONOSCOPY     • CYSTOSTOMY     • EGD AND COLONOSCOPY     • JOINT REPLACEMENT     • KIDNEY STONE SURGERY     • KNEE ARTHROSCOPY     • REPLACEMENT TOTAL KNEE     • TOOTH EXTRACTION  04/2022       Meds/Allergies     Medications Prior to Admission   Medication   • acetaminophen (TYLENOL) 500 mg tablet   • amLODIPine (NORVASC) 5 mg tablet   • Cholecalciferol 25 MCG (1000 UT) tablet   • Multiple Vitamins-Minerals (MULTIVITAMIN ADULT PO)   • oxybutynin (DITROPAN-XL) 10 MG 24 hr tablet   • repaglinide (PRANDIN) 1 mg tablet   • simvastatin (ZOCOR) 20 mg tablet   • warfarin (COUMADIN) 7 5 mg tablet   • metFORMIN (GLUCOPHAGE-XR) 500 mg 24 hr tablet       No current facility-administered medications for this encounter      No Known Allergies      Social History   Social History     Substance and Sexual Activity   Alcohol Use Not Currently     Social History     Substance and Sexual Activity   Drug Use Never     Social History     Tobacco Use   Smoking Status Former   Smokeless Tobacco Never         Family History:   Family History   Problem Relation Age of Onset   • Heart disease Mother    • Heart disease Father Objective     Current Vitals:      No intake or output data in the 24 hours ending 12/21/22 0752    Physical Exam:  General:  Resting comfortably in bed   Eyes:Sclera anicteric  ENT: Trachea midline  Pulm:  Symmetric chest raise  No respiratory Distress  CV:  Regular on monitor  Abdomen:  Soft NT ND  Extremities:  No clubbing/ cyanosis/ edema    Lab Results: I have personally reviewed pertinent lab results  Imaging: I have personally reviewed pertinent reports  ASSESSMENT:  Alexa Dee is a [de-identified] y o  male who presents for outpatient evaluation and treatment of anal lesion  Plan is for exam under anesthesia, biopsy of anal lesion  Risks of anal surgery, including but not limited to pain, bleeding, failure to heal properly, fecal incontinence, persistent or recurrent anal disease, infection, fistula, abscess were reviewed  Questions were answered

## 2022-12-21 NOTE — OP NOTE
OPERATIVE REPORT  PATIENT NAME: Maximus Mckeon    :  1940  MRN: [id number]  Pt Location: [facility] OR ROOM 07    SURGERY DATE: 2022    Surgeon(s) and Role:     * Nuria Mccracken MD - Primary    Preop Diagnosis:  Nodule of anus [K62 89]    Post-Op Diagnosis Codes:     * Nodule of anus [K62 89]    Procedure(s) (LRB):  EXAM UNDER ANESTHESIA (EUA) biopsy anal lesion, removal of anal foreign body, subcutaneous fistulotomy (N/A)    Specimen(s):  ID Type Source Tests Collected by Time Destination   1 : ANAL LESION Tissue Lesion TISSUE EXAM Nuria Mccracken MD 2022 9420        Estimated Blood Loss:   Minimal    Drains:  * No LDAs found *    Anesthesia Type:   General    Operative Indications:  Nodule of anus [K62 89]      Operative Findings:  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  Complications:   None    Procedure and Technique:  After preoperative identification in the preoperative holding area, the patient was taken the operating room placed in the supine position  Following introduction of general anesthesia the patient was in place in the prone jackknife position with buttocks taped apart  Patient was prepped and draped in usual sterile fashion  Timeout was undertaken and procedure begun  Examination was performed  In the postanal space, there were multiple small 2 mm skin appendages  In the midst of this proximally at the anal verge there is a small punctum that drained bloody material   Anoscopy was performed  Obvious postanal fistula was noted  Shepherds hook was placed within this this appeared to be very superficial   This was excised  Within this tract there was a short approximate 2 cm foreign body  This appeared to be made of wood, potentially a toothpick  This was removed and sent off the field  Skin appendages were then excised and sent off the field as anal lesion  These appear to be reactive probably secondary to the fistula and foreign body    Tract was cauterized as it was quite inflamed and granulating  No deeper component was noted  Anoscopy did not reveal any other lesions  Local field block was obtained using Exparel and bupivacaine  Patient was awakened from anesthesia and transferred to recovery room in stable condition       I was present for the entire procedure    Patient Disposition:  PACU         SIGNATURE: Pedro Hdez MD  DATE: December 21, 2022  TIME: 9:57 AM

## 2022-12-22 ENCOUNTER — TELEPHONE (OUTPATIENT)
Dept: INTERNAL MEDICINE CLINIC | Facility: CLINIC | Age: 82
End: 2022-12-22

## 2023-01-17 LAB
CREAT ?TM UR-SCNC: 100 UMOL/L
EXT MICROALBUMIN URINE RANDOM: 80
MICROALBUMIN/CREAT UR: NORMAL MG/G{CREAT}

## 2023-01-30 NOTE — PROGRESS NOTES
Diagnoses and all orders for this visit:    Hematochezia       Hematochezia  Patient presents for follow-up  He is status post exam under anesthesia  At exam he was noted to have a small rectal foreign body with resultant postanal fistula  He underwent fistulotomy and removal of foreign body  He notes he is doing well without complaints  Examination shows a small area of granulation at the post anal space but otherwise is completely healed  We discussed that we will likely never know where this foreign body came from  This could have been ingested  He does not remember any specific anorectal trauma  Either way, it appears that is healing very nicely no further care is required  If he has any concerning symptoms he will call for evaluation  HPI   Jack Baptiste is here today for post op evaluation  He is feeling well today  Patient denies any anal pain or bleeding  Patient is status post exam under anesthesia biopsy anal lesion, removal of anal foreign body, subcutaneous fistulotomy on 12/21/2022  Surgical pathology reported:  A  Lesion, ANAL LESION, excision:  - Anal tags, inflamed/irritated  - Negative for squamous intraepithelial lesion and malignancy         Past Medical History:   Diagnosis Date   • Chronic kidney disease    • Colon polyp    • 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)    • Diabetes mellitus ([facility] )    • Diverticulosis    • History of cardiac cath    • Hyperlipidemia    • Hypertension    • Kidney stone    • [facility]      Past Surgical History:   Procedure Laterality Date   • ANAL MANOMETRY  10/06/2015   • CARDIAC PACEMAKER PLACEMENT     • CATARACT EXTRACTION     • COLONOSCOPY     • CYSTOSTOMY     • EGD AND COLONOSCOPY     • JOINT REPLACEMENT     • KIDNEY STONE SURGERY     • KNEE ARTHROSCOPY     • [place] ANRCT XM SURG REQ ANES GENERAL SPI/EDRL DX N/A 12/21/2022    Procedure: EXAM UNDER ANESTHESIA (EUA) biopsy anal lesion, removal of anal foreign body, subcutaneous fistulotomy;  Surgeon: Lubna Russ MD;  [facility];  Service: Colorectal   • REPLACEMENT TOTAL KNEE     • TOOTH EXTRACTION  04/2022       Current Outpatient Medications:   •  acetaminophen (TYLENOL) 500 mg tablet, Take 500 mg by mouth 3 (three) times a day, Disp: , Rfl:   •  amLODIPine (NORVASC) 5 mg tablet, daily, Disp: , Rfl:   •  Cholecalciferol 25 MCG (1000 UT) tablet, Take 1,000 Units by mouth daily, Disp: , Rfl:   •  lisinopril (ZESTRIL) 5 mg tablet, Take 5 mg by mouth daily, Disp: , Rfl:   •  metFORMIN (GLUCOPHAGE-XR) 500 mg 24 hr tablet, Take 2 tablets (1,000 mg total) by mouth 2 (two) times a day with meals 2 tabs 2 times daily (Patient taking differently: Take 1,000 mg by mouth 2 (two) times a day with meals), Disp: 200 tablet, Rfl: 1  •  Multiple Vitamins-Minerals (MULTIVITAMIN ADULT PO), Take 1 tablet by mouth daily, Disp: , Rfl:   •  oxybutynin (DITROPAN-XL) 10 MG 24 hr tablet, Take 10 mg by mouth daily at bedtime, Disp: , Rfl:   •  repaglinide (PRANDIN) 1 mg tablet, Take 1 tablet by mouth daily with dinner, Disp: , Rfl:   •  simvastatin (ZOCOR) 20 mg tablet, Take 20 mg by mouth daily at bedtime, Disp: , Rfl:   •  warfarin (COUMADIN) 7 5 mg tablet, Take 7 5 mg by mouth daily  , Disp: , Rfl:   Allergies as of 02/01/2023   • (No Known Allergies)     Review of Systems   All other systems reviewed and are negative  There were no vitals filed for this visit  Physical Exam  Constitutional:       Appearance: Normal appearance  HENT:      Head: Normocephalic and atraumatic  Eyes:      Extraocular Movements: Extraocular movements intact  Pupils: Pupils are equal, round, and reactive to light  Genitourinary:     Comments: Small post anal granulation otherwise wound is almost completely healed  Musculoskeletal:         General: Normal range of motion  Skin:     General: Skin is warm and dry  Neurological:      General: No focal deficit present  Mental Status: He is alert and oriented to person, place, and time  Psychiatric:         Mood and Affect: Mood normal          Behavior: Behavior normal          Thought Content:  Thought content normal          Judgment: Judgment normal

## 2023-02-01 ENCOUNTER — OFFICE VISIT (OUTPATIENT)
Age: 83
End: 2023-02-01

## 2023-02-01 VITALS — BODY MASS INDEX: 24 KG/M2 | WEIGHT: 187 LBS | HEIGHT: 74 IN

## 2023-02-01 DIAGNOSIS — K92.1 HEMATOCHEZIA: Primary | ICD-10-CM

## 2023-02-01 RX ORDER — LISINOPRIL 5 MG/1
5 TABLET ORAL DAILY
COMMUNITY
Start: 2023-01-20

## 2023-02-01 NOTE — ASSESSMENT & PLAN NOTE
Patient presents for follow-up  He is status post exam under anesthesia  At exam he was noted to have a small rectal foreign body with resultant postanal fistula  He underwent fistulotomy and removal of foreign body  He notes he is doing well without complaints  Examination shows a small area of granulation at the post anal space but otherwise is completely healed  We discussed that we will likely never know where this foreign body came from  This could have been ingested  He does not remember any specific anorectal trauma  Either way, it appears that is healing very nicely no further care is required  If he has any concerning symptoms he will call for evaluation

## 2023-02-06 ENCOUNTER — TELEPHONE (OUTPATIENT)
Dept: NEPHROLOGY | Facility: CLINIC | Age: 83
End: 2023-02-06

## 2023-02-14 ENCOUNTER — OFFICE VISIT (OUTPATIENT)
Dept: NEPHROLOGY | Facility: CLINIC | Age: 83
End: 2023-02-14

## 2023-02-14 VITALS
DIASTOLIC BLOOD PRESSURE: 70 MMHG | HEIGHT: 74 IN | WEIGHT: 185 LBS | BODY MASS INDEX: 23.74 KG/M2 | OXYGEN SATURATION: 99 % | RESPIRATION RATE: 16 BRPM | TEMPERATURE: 97.9 F | SYSTOLIC BLOOD PRESSURE: 110 MMHG | HEART RATE: 75 BPM

## 2023-02-14 DIAGNOSIS — E11.9 TYPE 2 DIABETES MELLITUS WITHOUT COMPLICATION, WITHOUT LONG-TERM CURRENT USE OF INSULIN (HCC): ICD-10-CM

## 2023-02-14 DIAGNOSIS — E11.59 HYPERTENSION ASSOCIATED WITH DIABETES (HCC): ICD-10-CM

## 2023-02-14 DIAGNOSIS — I48.20 CHRONIC ATRIAL FIBRILLATION (HCC): ICD-10-CM

## 2023-02-14 DIAGNOSIS — E83.9 CHRONIC KIDNEY DISEASE-MINERAL AND BONE DISORDER: ICD-10-CM

## 2023-02-14 DIAGNOSIS — N18.31 STAGE 3A CHRONIC KIDNEY DISEASE (HCC): ICD-10-CM

## 2023-02-14 DIAGNOSIS — N18.2 STAGE 2 CHRONIC KIDNEY DISEASE: Primary | ICD-10-CM

## 2023-02-14 DIAGNOSIS — M89.9 CHRONIC KIDNEY DISEASE-MINERAL AND BONE DISORDER: ICD-10-CM

## 2023-02-14 DIAGNOSIS — I15.2 HYPERTENSION ASSOCIATED WITH DIABETES (HCC): ICD-10-CM

## 2023-02-14 DIAGNOSIS — N18.9 CHRONIC KIDNEY DISEASE-MINERAL AND BONE DISORDER: ICD-10-CM

## 2023-02-14 NOTE — ASSESSMENT & PLAN NOTE
Lab Results   Component Value Date    HGBA1C 7 0 (H) 01/24/2023   Very well controlled with present medication which include lisinopril

## 2023-02-14 NOTE — ASSESSMENT & PLAN NOTE
Lab Results   Component Value Date    EGFR 61 11/19/2022    EGFR 59 11/18/2022    EGFR 51 11/17/2022    CREATININE 1 13 11/19/2022    CREATININE 1 16 11/18/2022    CREATININE 1 31 (H) 11/17/2022   Overall stable with some fluctuation    Advise hydration and avoiding nephrotoxic medication

## 2023-02-14 NOTE — PROGRESS NOTES
NEPHROLOGY OFFICE FOLLOW UP  Eric Jay 82 y o  male MRN: [id number]    Encounter: [id number] 2/14/2023    REASON FOR VISIT: Eric Jay is a 82 y o  male who is here on 2/14/2023 for Chronic Kidney Disease and Follow-up    HPI:    Danielito Bravo came in today for follow-up of CKD stage II   61-year-old gentleman who is doing well overall  This is his yearly visit    In last year he has some abdominal discomfort and rectal bleed "  Emergency room where CT scan revealed possible some foreign body in the rectal area  Had a colonoscopy  Was seen by GI as well as colorectal surgeon  Still we do not have idea about this foreign body  Apparently there is no cancer    Patient is doing well no other acute complaint      REVIEW OF SYSTEMS:    Review of Systems   Constitutional: Negative for activity change and fatigue  HENT: Negative for congestion and ear discharge  Eyes: Negative for photophobia and pain  Respiratory: Negative for apnea and choking  Cardiovascular: Negative for chest pain and palpitations  Gastrointestinal: Negative for abdominal distention and blood in stool  Endocrine: Negative for heat intolerance and polyphagia  Genitourinary: Negative for flank pain and urgency  Musculoskeletal: Negative for neck pain and neck stiffness  Skin: Negative for color change and wound  Allergic/Immunologic: Negative for food allergies and immunocompromised state  Neurological: Negative for seizures and facial asymmetry  Hematological: Negative for adenopathy  Does not bruise/bleed easily  Psychiatric/Behavioral: Negative for self-injury and suicidal ideas           PAST MEDICAL HISTORY:  Past Medical History:   Diagnosis Date   • Chronic kidney disease    • Colon polyp 11/2022   • 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)    • Diabetes mellitus ([facility] )    • Diverticulosis    • History of cardiac cath    • Hyperlipidemia    • Hypertension    • Kidney stone    • SSS (s[facility]) [facility]        PAST SURGICAL HISTORY:  Past Surgical History:   Procedure Laterality Date   • ANAL MANOMETRY  10/06/2015   • CARDIAC PACEMAKER PLACEMENT     • CATARACT EXTRACTION     • COLONOSCOPY     • CYSTOSTOMY     • EGD AND COLONOSCOPY     • JOINT REPLACEMENT     • KIDNEY STONE SURGERY     • KNEE ARTHROSCOPY     • [place] ANRCT XM SURG REQ ANES GENERAL SPI/EDRL DX N/A 12/21/2022    Procedure: EXAM UNDER ANESTHESIA (EUA) biopsy anal lesion, removal of anal foreign body, subcutaneous fistulotomy;  Surgeon: Paloma Warren MD;  [facility]: [facility];  Service: Colorectal   • REPLACEMENT TOTAL KNEE     • TOOTH EXTRACTION  04/2022       SOCIAL HISTORY:  Social History     Substance and Sexual Activity   Alcohol Use Not Currently     Social History     Substance and Sexual Activity   Drug Use Never     Social History     Tobacco Use   Smoking Status Former   Smokeless Tobacco Never       FAMILY HISTORY:  Family History   Problem Relation Age of Onset   • Heart disease Mother    • Heart disease Father        MEDICATIONS:    Current Outpatient Medications:   •  acetaminophen (TYLENOL) 500 mg tablet, Take 500 mg by mouth 3 (three) times a day, Disp: , Rfl:   •  amLODIPine (NORVASC) 5 mg tablet, daily, Disp: , Rfl:   •  Cholecalciferol 25 MCG (1000 UT) tablet, Take 1,000 Units by mouth daily, Disp: , Rfl:   •  lisinopril (ZESTRIL) 5 mg tablet, Take 5 mg by mouth daily, Disp: , Rfl:   •  metFORMIN (GLUCOPHAGE-XR) 500 mg 24 hr tablet, Take 2 tablets (1,000 mg total) by mouth 2 (two) times a day with meals 2 tabs 2 times daily (Patient taking differently: Take 1,000 mg by mouth 2 (two) times a day with meals), Disp: 200 tablet, Rfl: 1  •  Multiple Vitamins-Minerals (MULTIVITAMIN ADULT PO), Take 1 tablet by mouth daily, Disp: , Rfl:   •  oxybutynin (DITROPAN-XL) 10 MG 24 hr tablet, Take 10 mg by mouth daily at bedtime, Disp: , Rfl:   •  repaglinide (PRANDIN) 1 mg tablet, Take 1 tablet by mouth daily with dinner, Disp: , Rfl:   •  simvastatin (ZOCOR) 20 mg tablet, Take 20 mg by mouth daily at bedtime, Disp: , Rfl:   •  warfarin (COUMADIN) 7 5 mg tablet, Take 7 5 mg by mouth daily  , Disp: , Rfl:     PHYSICAL EXAM:  Vitals:    02/14/23 1136   BP: 110/70   BP Location: Right arm   Patient Position: Sitting   Pulse: 75   Resp: 16   Temp: 97 9 °F (36 6 °C)   TempSrc: Temporal   SpO2: 99%   Weight: 83 9 kg (185 lb)   Height: 6' 2" (1 88 m)     Body mass index is 23 75 kg/m²  Physical Exam  Constitutional:       General: He is not in acute distress  Appearance: He is well-developed  HENT:      Head: Normocephalic  Eyes:      General: No scleral icterus  Conjunctiva/sclera: Conjunctivae normal    Neck:      Vascular: No JVD  Cardiovascular:      Rate and Rhythm: Normal rate  Heart sounds: Normal heart sounds  Pulmonary:      Effort: Pulmonary effort is normal       Breath sounds: No wheezing  Abdominal:      Palpations: Abdomen is soft  Tenderness: There is no abdominal tenderness  Musculoskeletal:         General: Normal range of motion  Cervical back: Neck supple  Skin:     General: Skin is warm  Findings: No rash  Neurological:      Mental Status: He is alert and oriented to person, place, and time     Psychiatric:         Behavior: Behavior normal          LAB RESULTS:  Results for orders placed or performed during the hospital encounter of 12/21/22   Tissue Exam   Result Value Ref Range    Case Report       Surgical Pathology Report                         Case: [id number]                                   Authorizing Provider:  Diego Pérez MD   Collected:           12/21/2022 2741              Ordering Location:     [address] [place] [address]      Received:            12/21/2022 [address] [place] [address] Room                                                      Pathologist:           Rhea Welch MD Specimen:    Anus, ANAL LESION                                                                          Final Diagnosis       A  Lesion, ANAL LESION, excision:  - Anal tags, inflamed/irritated  - Negative for squamous intraepithelial lesion and malignancy  Additional Information       All reported additional testing was performed with appropriately reactive controls  These tests were developed and their performance characteristics determined by [facility] or appropriate performing facility, though some tests may be performed on tissues which have not been validated for performance characteristics (such as staining performed on alcohol exposed cell blocks and decalcified tissues)  Results should be interpreted with caution and in the context of the patients’ clinical condition  These tests may not be cleared or approved by the [place]  Food and Drug Administration, though the FDA has determined that such clearance or approval is not necessary  These tests are used for clinical purposes and they should not be regarded as investigational or for research  This laboratory has been approved by [facility], designated as a high-complexity laboratory and is qualified to perform these tests  Interpretation performed at [facility], [address] [place] [address]  Gross Description       A  The specimen is received in formalin, labeled with the patient's name and hospital number, and is designated "anal lesion"  The specimen consists of multiple tan-gray irregularly shaped portions of tissue measuring in aggregate 2 2 x 1 4 x 1 3 cm  The presumed margins of resection are inked green and the specimen is serially sectioned revealing tan-brown smooth and glistening cut surfaces  Entirely submitted  Two cassettes  Note: The estimated total formalin fixation time based upon information provided by the submitting clinician and the standard processing schedule is under 72 hours  -Eusebio Francisco      Clinical Information       nodule of anus  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  Skin appendages   Fingerstick Glucose (POCT)   Result Value Ref Range    POC Glucose 128 65 - 140 mg/dl   Fingerstick Glucose (POCT)   Result Value Ref Range    POC Glucose 134 65 - 140 mg/dl       ASSESSMENT and PLAN:      Stage 2 chronic kidney disease  Lab Results   Component Value Date    EGFR 61 11/19/2022    EGFR 59 11/18/2022    EGFR 51 11/17/2022    CREATININE 1 13 11/19/2022    CREATININE 1 16 11/18/2022    CREATININE 1 31 (H) 11/17/2022   Overall stable with some fluctuation  Advise hydration and avoiding nephrotoxic medication    Stage 3a chronic kidney disease [facility])  Lab Results   Component Value Date    EGFR 61 11/19/2022    EGFR 59 11/18/2022    EGFR 51 11/17/2022    CREATININE 1 13 11/19/2022    CREATININE 1 16 11/18/2022    CREATININE 1 31 (H) 11/17/2022   Does have fluctuating kidney function  This is based on volume status  Advise continued hydration    Type 2 diabetes mellitus without complication ([facility] [address] )    Lab Results   Component Value Date    HGBA1C 7 0 (H) 01/24/2023   Very well controlled  Importance of diabetic control effect on kidney disease discussed with him    Hypertension associated with diabetes [facility])    Lab Results   Component Value Date    HGBA1C 7 0 (H) 01/24/2023   Very well controlled with present medication which include lisinopril      Everything discussed with patient at length  I will see him back in 1 year again  We will get blood and urine test before that visit        Portions of the record may have been created with voice recognition software  Occasional wrong word or "sound a like" substitutions may have occurred due to the inherent limitations of voice recognition software  Read the chart carefully and recognize, using context, where substitutions have occurred  If you have any questions, please contact the dictating provider

## 2023-02-14 NOTE — ASSESSMENT & PLAN NOTE
Lab Results   Component Value Date    EGFR 61 11/19/2022    EGFR 59 11/18/2022    EGFR 51 11/17/2022    CREATININE 1 13 11/19/2022    CREATININE 1 16 11/18/2022    CREATININE 1 31 (H) 11/17/2022   Does have fluctuating kidney function  This is based on volume status    Advise continued hydration

## 2023-02-14 NOTE — ASSESSMENT & PLAN NOTE
Lab Results   Component Value Date    HGBA1C 7 0 (H) 01/24/2023   Very well controlled    Importance of diabetic control effect on kidney disease discussed with him

## 2023-02-21 ENCOUNTER — TELEPHONE (OUTPATIENT)
Dept: INTERNAL MEDICINE CLINIC | Facility: CLINIC | Age: 83
End: 2023-02-21

## 2023-02-21 ENCOUNTER — OFFICE VISIT (OUTPATIENT)
Dept: INTERNAL MEDICINE CLINIC | Facility: CLINIC | Age: 83
End: 2023-02-21

## 2023-02-21 VITALS
TEMPERATURE: 98.2 F | BODY MASS INDEX: 23.95 KG/M2 | RESPIRATION RATE: 16 BRPM | HEIGHT: 74 IN | DIASTOLIC BLOOD PRESSURE: 66 MMHG | HEART RATE: 74 BPM | WEIGHT: 186.6 LBS | SYSTOLIC BLOOD PRESSURE: 158 MMHG | OXYGEN SATURATION: 99 %

## 2023-02-21 DIAGNOSIS — J18.9 PNEUMONIA OF LEFT LOWER LOBE DUE TO INFECTIOUS ORGANISM: Primary | ICD-10-CM

## 2023-02-21 PROBLEM — E55.9 VITAMIN D DEFICIENCY: Status: ACTIVE | Noted: 2021-03-23

## 2023-02-21 PROBLEM — E11.65 TYPE 2 DIABETES MELLITUS WITH HYPERGLYCEMIA, WITHOUT LONG-TERM CURRENT USE OF INSULIN (HCC): Status: ACTIVE | Noted: 2017-12-28

## 2023-02-21 PROBLEM — E53.8 B12 DEFICIENCY: Status: ACTIVE | Noted: 2021-07-15

## 2023-02-21 PROBLEM — E11.42 DIABETIC POLYNEUROPATHY ASSOCIATED WITH TYPE 2 DIABETES MELLITUS (HCC): Status: ACTIVE | Noted: 2021-07-15

## 2023-02-21 PROBLEM — R80.9 ALBUMINURIA: Status: ACTIVE | Noted: 2023-01-17

## 2023-02-21 RX ORDER — BENZONATATE 100 MG/1
100 CAPSULE ORAL 3 TIMES DAILY PRN
COMMUNITY
Start: 2023-02-21 | End: 2023-02-26

## 2023-02-21 RX ORDER — DOXYCYCLINE HYCLATE 100 MG
TABLET ORAL
COMMUNITY
Start: 2023-02-21

## 2023-02-21 NOTE — TELEPHONE ENCOUNTER
SEEN  AT  [facility]    TODAY   HAD   A  CHEST  XRAY  DONE    THE  [facility]   WANTS  ZAFAR   OR  ONE  OF  OUR  DR   TO   LOOK   AT  HIS  RESULTS  FROM TODAY   GAVE  HIM  AN  ANTIBIOTIC  TO  START

## 2023-02-21 NOTE — PROGRESS NOTES
INTERNAL MEDICINE FOLLOW-UP OFFICE VISIT  [facility] AT [place] [facility]    NAME: Lupillo Castorena  AGE: 82 y o  SEX: male  : 1940   MRN: [id number]    DATE: 2023  TIME: 4:03 PM    Assessment and Plan     Diagnoses and all orders for this visit:    Pneumonia of left lower lobe due to infectious organism  Was at the [facility] urgent care this morning and had a chest x-ray done which showed a suspicion of left lower lobe pneumonia/atelectasis  He was given doxycycline for 7 days along with Tessalon Perles  He was concerned how he could have got a pneumonia and that was the reason for the visit  I reassured him that it is a mild pneumonia which will get better once he takes his medications regularly  Other orders  -     benzonatate (TESSALON PERLES) 100 mg capsule; Take 100 mg by mouth Three times daily as needed  -     doxycycline hyclate (VIBRA-TABS) 100 mg tablet        - Counseling Documentation: patient was counseled regarding: diagnostic results, instructions for management, risk factor reductions, prognosis, patient and family education, risks and benefits of treatment options and importance of compliance with treatment  - Medication Side Effects: Adverse side effects of medications were reviewed with the patient/guardian today  Return to office in: As needed    Chief Complaint     Chief Complaint   Patient presents with   • Cough       History of Present Illness     HPI  Patient was here for follow-up of pneumonia from the urgent care this morning  Has a cough for the last 2 weeks which came after a cold  Denies any fever or chills    Denies any shortness of breath      The following portions of the patient's history were reviewed and updated as appropriate: allergies, current medications, past family history, past medical history, past social history, past surgical history and problem list     Review of Systems     Review of Systems   Constitutional: Negative for chills, diaphoresis, fatigue and fever  HENT: Negative for congestion, ear discharge, ear pain, hearing loss, postnasal drip, rhinorrhea, sinus pressure, sinus pain, sneezing, sore throat and voice change  Eyes: Negative for pain, discharge, redness and visual disturbance  Respiratory: Positive for cough  Negative for chest tightness, shortness of breath and wheezing  Cardiovascular: Negative for chest pain, palpitations and leg swelling  Gastrointestinal: Negative for abdominal distention, abdominal pain, blood in stool, constipation, diarrhea, nausea and vomiting  Endocrine: Negative for cold intolerance, heat intolerance, polydipsia, polyphagia and polyuria  Genitourinary: Negative for dysuria, flank pain, frequency, hematuria and urgency  Musculoskeletal: Negative for arthralgias, back pain, gait problem, joint swelling, myalgias, neck pain and neck stiffness  Skin: Negative for rash  Neurological: Negative for dizziness, tremors, syncope, facial asymmetry, speech difficulty, weakness, light-headedness, numbness and headaches  Hematological: Does not bruise/bleed easily  Psychiatric/Behavioral: Negative for behavioral problems, confusion and sleep disturbance  The patient is not nervous/anxious          Active Problem List     Patient Active Problem List   Diagnosis   • Hypertension associated with diabetes ([facility] )   • Stage 2 chronic kidney disease   • Type 2 diabetes mellitus with hyperglycemia, without long-term current use of insulin ([facility])   • SSS (sick sinus syndrome) ([facility])   • 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 ([facility])   • Pre-operative clearance   • Chronic atrial fibrillation ([facility])   • Chronic kidney disease-mineral and bone disorder   • Hematochezia   • Stage 3a chronic kidney disease ([facility])   • COVID   • Abnormal CT scan   • Hyperlipidemia   • Pacemaker   • Vitamin D deficiency   • Diabetic polyneuropathy associated with type 2 diabetes mellitus ([facility] )   • B12 deficiency   • Albuminuria   • Knee pain       Objective     /66 (BP Location: Left arm, Patient Position: Sitting, Cuff Size: Standard)   Pulse 74   Temp 98 2 °F (36 8 °C) (Tympanic)   Resp 16   Ht 6' 2" (1 88 m)   Wt 84 6 kg (186 lb 9 6 oz)   SpO2 99%   BMI 23 96 kg/m²     Physical Exam  Constitutional:       General: He is not in acute distress  Appearance: He is well-developed  He is not diaphoretic  HENT:      Head: Normocephalic and atraumatic  Right Ear: External ear normal       Left Ear: External ear normal       Nose: Nose normal    Eyes:      General: No scleral icterus  Right eye: No discharge  Left eye: No discharge  Conjunctiva/sclera: Conjunctivae normal    Neck:      Thyroid: No thyromegaly  Vascular: No JVD  Trachea: No tracheal deviation  Cardiovascular:      Rate and Rhythm: Normal rate and regular rhythm  Heart sounds: Normal heart sounds  No murmur heard  No friction rub  No gallop  Pulmonary:      Effort: Pulmonary effort is normal  No respiratory distress  Breath sounds: Normal breath sounds  No wheezing or rales  Chest:      Chest wall: No tenderness  Abdominal:      General: Bowel sounds are normal  There is no distension  Palpations: Abdomen is soft  Tenderness: There is no abdominal tenderness  There is no guarding or rebound  Musculoskeletal:         General: No tenderness  Normal range of motion  Cervical back: Normal range of motion and neck supple  Lymphadenopathy:      Cervical: No cervical adenopathy  Skin:     General: Skin is warm and dry  Findings: No erythema or rash  Neurological:      Mental Status: He is alert and oriented to person, place, and time  Cranial Nerves: No cranial nerve deficit  Motor: No abnormal muscle tone        Coordination: Coordination normal    Psychiatric:         Judgment: Judgment normal              Current Medications       Current Outpatient Medications: •  acetaminophen (TYLENOL) 500 mg tablet, Take 500 mg by mouth 3 (three) times a day, Disp: , Rfl:   •  amLODIPine (NORVASC) 5 mg tablet, daily, Disp: , Rfl:   •  benzonatate (TESSALON PERLES) 100 mg capsule, Take 100 mg by mouth Three times daily as needed, Disp: , Rfl:   •  doxycycline hyclate (VIBRA-TABS) 100 mg tablet, , Disp: , Rfl:   •  lisinopril (ZESTRIL) 5 mg tablet, Take 5 mg by mouth daily, Disp: , Rfl:   •  metFORMIN (GLUCOPHAGE-XR) 500 mg 24 hr tablet, Take 2 tablets (1,000 mg total) by mouth 2 (two) times a day with meals 2 tabs 2 times daily (Patient taking differently: Take 1,000 mg by mouth 2 (two) times a day with meals), Disp: 200 tablet, Rfl: 1  •  Multiple Vitamins-Minerals (MULTIVITAMIN ADULT PO), Take 1 tablet by mouth daily, Disp: , Rfl:   •  oxybutynin (DITROPAN-XL) 10 MG 24 hr tablet, Take 10 mg by mouth daily at bedtime, Disp: , Rfl:   •  repaglinide (PRANDIN) 1 mg tablet, Take 1 tablet by mouth daily with dinner, Disp: , Rfl:   •  simvastatin (ZOCOR) 20 mg tablet, Take 20 mg by mouth daily at bedtime, Disp: , Rfl:   •  warfarin (COUMADIN) 7 5 mg tablet, Take 7 5 mg by mouth daily  , Disp: , Rfl:   •  Cholecalciferol 25 MCG (1000 UT) tablet, Take 1,000 Units by mouth daily, Disp: , Rfl:     Health Maintenance     Health Maintenance   Topic Date Due   • DM Eye Exam  Never done   • Pneumococcal Vaccine: 65+ Years (3 - PPSV23 if available, else PCV20) 09/27/2017   • COVID-19 Vaccine (5 - Booster for Moderna series) 10/04/2022   • Kidney Health Evaluation: Microalbumin/Creatinine Ratio  02/21/2023   • Fall Risk  04/16/2023   • Medicare Annual Wellness Visit (AWV)  04/16/2023   • HEMOGLOBIN A1C  07/24/2023   • Depression Screening  11/29/2023   • Diabetic Foot Exam  11/29/2023   • Kidney Health Evaluation: GFR  02/07/2024   • BMI: Adult  02/21/2024   • Colorectal Cancer Screening  11/18/2027   • Hepatitis C Screening  Completed   • Influenza Vaccine  Completed   • HIB Vaccine  Aged Out   • IPV Vaccine  Aged Out   • Hepatitis A Vaccine  Aged Out   • Meningococcal ACWY Vaccine  Aged Out   • HPV Vaccine  Aged Out     Immunization History   Administered Date(s) Administered   • COVID-19 MODERNA VACC 0 5 ML IM 01/22/2021, 03/01/2021, 11/03/2021, 08/09/2022, 08/09/2022   • INFLUENZA 09/01/2020, 09/20/2022   • Influenza, high dose seasonal 0 7 mL 10/02/2020, 10/16/2021, 09/20/2022   • Influenza, seasonal, injectable 10/13/2010   • Pneumococcal Conjugate 13-Valent 09/27/2016   • Pneumococcal Polysaccharide PPV23 01/01/2006   • influenza, trivalent, adjuvanted 09/30/2019, 10/02/2020, 10/16/2021         Robert Lawler MD  [id number] [facility] BEHAVIORAL MEDICINE AT [facility]

## 2023-05-02 ENCOUNTER — VBI (OUTPATIENT)
Dept: ADMINISTRATIVE | Facility: OTHER | Age: 83
End: 2023-05-02

## 2023-05-02 NOTE — TELEPHONE ENCOUNTER
Upon review of the In Basket request we were able to locate, review, and update the patient chart as requested for Urine Microalbumin  Any additional questions or concerns should be emailed to the Practice Liaisons via the appropriate education email address, please do not reply via In Basket      Thank you  Trey Torres

## 2023-05-10 ENCOUNTER — OFFICE VISIT (OUTPATIENT)
Age: 83
End: 2023-05-10

## 2023-05-10 VITALS
BODY MASS INDEX: 23.23 KG/M2 | WEIGHT: 181 LBS | HEIGHT: 74 IN | OXYGEN SATURATION: 99 % | HEART RATE: 63 BPM | RESPIRATION RATE: 18 BRPM | SYSTOLIC BLOOD PRESSURE: 124 MMHG | DIASTOLIC BLOOD PRESSURE: 70 MMHG | TEMPERATURE: 96.7 F

## 2023-05-10 DIAGNOSIS — I48.20 CHRONIC ATRIAL FIBRILLATION (HCC): ICD-10-CM

## 2023-05-10 DIAGNOSIS — E11.59 HYPERTENSION ASSOCIATED WITH DIABETES (HCC): ICD-10-CM

## 2023-05-10 DIAGNOSIS — I15.2 HYPERTENSION ASSOCIATED WITH DIABETES (HCC): ICD-10-CM

## 2023-05-10 DIAGNOSIS — Z00.00 MEDICARE ANNUAL WELLNESS VISIT, SUBSEQUENT: Primary | ICD-10-CM

## 2023-05-10 DIAGNOSIS — E11.42 DIABETIC POLYNEUROPATHY ASSOCIATED WITH TYPE 2 DIABETES MELLITUS (HCC): ICD-10-CM

## 2023-05-10 DIAGNOSIS — E11.65 TYPE 2 DIABETES MELLITUS WITH HYPERGLYCEMIA, WITHOUT LONG-TERM CURRENT USE OF INSULIN (HCC): ICD-10-CM

## 2023-05-10 NOTE — PATIENT INSTRUCTIONS
Medicare Preventive Visit Patient Instructions  Thank you for completing your Welcome to Medicare Visit or Medicare Annual Wellness Visit today  Your next wellness visit will be due in one year (5/10/2024)  The screening/preventive services that you may require over the next 5-10 years are detailed below  Some tests may not apply to you based off risk factors and/or age  Screening tests ordered at today's visit but not completed yet may show as past due  Also, please note that scanned in results may not display below  Preventive Screenings:  Service Recommendations Previous Testing/Comments   Colorectal Cancer Screening  · Colonoscopy    · Fecal Occult Blood Test (FOBT)/Fecal Immunochemical Test (FIT)  · Fecal DNA/Cologuard Test  · Flexible Sigmoidoscopy Age: 39-70 years old   Colonoscopy: every 10 years (May be performed more frequently if at higher risk)  OR  FOBT/FIT: every 1 year  OR  Cologuard: every 3 years  OR  Sigmoidoscopy: every 5 years  Screening may be recommended earlier than age 39 if at higher risk for colorectal cancer  Also, an individualized decision between you and your healthcare provider will decide whether screening between the ages of 74-80 would be appropriate   Colonoscopy: 11/19/2022  FOBT/FIT: Not on file  Cologuard: Not on file  Sigmoidoscopy: Not on file    Screening Current     Prostate Cancer Screening Individualized decision between patient and health care provider in men between ages of 53-78   Medicare will cover every 12 months beginning on the day after your 50th birthday PSA: 1 4 ng/mL     Screening Not Indicated     Hepatitis C Screening Once for adults born between 1945 and 1965  More frequently in patients at high risk for Hepatitis C Hep C Antibody: 02/21/2022    Screening Current   Diabetes Screening 1-2 times per year if you're at risk for diabetes or have pre-diabetes Fasting glucose: 120 mg/dL (11/18/2022)  A1C: 7 0 % (1/24/2023)  Screening Not Indicated  History (Prevnar 20)  * Pneumococcal polysaccharide vaccine = PPSV23 (Pneumovax) Adults 2364 years old: 1-3 doses may be recommended based on certain risk factors  Adults 72 years old: 1-2 doses may be recommended based off what pneumonia vaccine you previously received   Hepatitis B Vaccine 3 dose series if at intermediate or high risk (ex: diabetes, end stage renal disease, liver disease)   Tetanus (Td) Vaccine - COST NOT COVERED BY MEDICARE PART B Following completion of primary series, a booster dose should be given every 10 years to maintain immunity against tetanus  Td may also be given as tetanus wound prophylaxis  Tdap Vaccine - COST NOT COVERED BY MEDICARE PART B Recommended at least once for all adults  For pregnant patients, recommended with each pregnancy  Shingles Vaccine (Shingrix) - COST NOT COVERED BY MEDICARE PART B  2 shot series recommended in those aged 48 and above     Health Maintenance Due:      Topic Date Due   • Colorectal Cancer Screening  11/18/2027   • Hepatitis C Screening  Completed     Immunizations Due:      Topic Date Due   • Pneumococcal Vaccine: 65+ Years (3 - PPSV23 if available, else PCV20) 09/27/2017   • COVID-19 Vaccine (6 - Booster for Derrell Manna series) 10/04/2022     Advance Directives   What are advance directives? Advance directives are legal documents that state your wishes and plans for medical care  These plans are made ahead of time in case you lose your ability to make decisions for yourself  Advance directives can apply to any medical decision, such as the treatments you want, and if you want to donate organs  What are the types of advance directives? There are many types of advance directives, and each state has rules about how to use them  You may choose a combination of any of the following:  · Living will: This is a written record of the treatment you want  You can also choose which treatments you do not want, which to limit, and which to stop at a certain time  This includes surgery, medicine, IV fluid, and tube feedings  · Durable power of  for healthcare [facility]): This is a written record that states who you want to make healthcare choices for you when you are unable to make them for yourself  This person, called a proxy, is usually a family member or a friend  You may choose more than 1 proxy  · Do not resuscitate (DNR) order:  A DNR order is used in case your heart stops beating or you stop breathing  It is a request not to have certain forms of treatment, such as CPR  A DNR order may be included in other types of advance directives  · Medical directive: This covers the care that you want if you are in a coma, near death, or unable to make decisions for yourself  You can list the treatments you want for each condition  Treatment may include pain medicine, surgery, blood transfusions, dialysis, IV or tube feedings, and a ventilator (breathing machine)  · Values history: This document has questions about your views, beliefs, and how you feel and think about life  This information can help others choose the care that you would choose  Why are advance directives important? An advance directive helps you control your care  Although spoken wishes may be used, it is better to have your wishes written down  Spoken wishes can be misunderstood, or not followed  Treatments may be given even if you do not want them  An advance directive may make it easier for your family to make difficult choices about your care  © Copyright [employer] 2018 Information is for End User's use only and may not be sold, redistributed or otherwise used for commercial purposes   All illustrations and images included in CareNotes® are the copyrighted property of [employer]  or [facility] [place][note 2] [employer]

## 2023-05-10 NOTE — PROGRESS NOTES
" Assessment and Plan:     Problem List Items Addressed This Visit    None      Depression Screening and Follow-up Plan: Patient was screened for depression during today's encounter  They screened negative with a PHQ-2 score of 0  Preventive health issues were discussed with patient, and age appropriate screening tests were ordered as noted in patient's After Visit Summary  Personalized health advice and appropriate referrals for health education or preventive services given if needed, as noted in patient's After Visit Summary  History of Present Illness:     Patient presents for a Medicare Wellness Visit    In 2022 he was hospitalized  for GI bleed  He had red blood per rectum  Did not need a blood transfusion   Hemoglobin hematocrit really did not drop at all  Colonoscopy done identify 3 anal nodules and 1 sigmoid polyp  Pathology is still pending   Extensive sigmoid diverticulosis was noted but there was no bleeding from those lesions  The colonoscopy is to, Dr Jose Valdez, describes the anal lesions as bleeding with \"minimal \"touch subsequently removed by a colorectal surgeon with correction of the problem  Back pain has resolved    Diabetic, followed by [facility] endocrinology  Doing quite well on metformin plus Prandin with recent hemoglobin A1c of 7 0  He has chronic kidney disease stage 2 and follows with nephrology       No cigarettes, no alcohol, no illicit drugs    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[employer]   and lives with his wife  History of sick sinus syndrome  He has a pacemaker  This was placed in 2016  A complication of care was 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  He fibrillating so is on anticoagulation anyway  Right now on Coumadin because he cannot afford Eliquis  Globally feels well  Mom  from stroke, father  from heart related illness  Both a relatively young ages  The patient had bilateral cataract surgery    He " had kidney stones  He has diverticulosis  A recent E[facility] done at [facility] shows 100% pacing       Patient Care Team:  Yudelka Stovall MD as PCP - General (Internal Medicine)  Milly Romero MD as Consulting Physician (Nephrology)  Yolanda Longoria MD (Orthopedic Surgery)  Yudelka Stovall MD (Internal Medicine)  Nidia Mcdonald MD (Gastroenterology)     Review of Systems:     Review of Systems     Problem List:     Patient Active Problem List   Diagnosis   • Hypertension associated with diabetes [facility])   • Stage 2 chronic kidney disease   • Type 2 diabetes mellitus with hyperglycemia, without long-term current use of insulin (HCC)   • SSS (sick sinus syndrome) ([address] )   • 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 ([facility])   • Pre-operative clearance   • Chronic atrial fibrillation ([facility] )   • Chronic kidney disease-mineral and bone disorder   • Hematochezia   • Stage 3a chronic kidney disease ([facility] )   • COVID   • Abnormal CT scan   • Hyperlipidemia   • Pacemaker   • Vitamin D deficiency   • Diabetic polyneuropathy associated with type 2 diabetes mellitus ([facility] )   • B12 deficiency   • Albuminuria   • Knee pain      Past Medical and Surgical History:     Past Medical History:   Diagnosis Date   • Chronic kidney disease    • Colon polyp 11/2022   • 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 ([facility] )    • Diabetes mellitus ([facility] )    • Diverticulosis    • History of cardiac cath    • Hyperlipidemia    • Hypertension    • Kidney stone    • SSS (sick sinus syndrome) ([facility] )      Past Surgical History:   Procedure Laterality Date   • ANAL MANOMETRY  10/06/2015   • CARDIAC PACEMAKER PLACEMENT     • CATARACT EXTRACTION     • COLONOSCOPY     • CYSTOSTOMY     • EGD AND COLONOSCOPY     • JOINT REPLACEMENT     • KIDNEY STONE SURGERY     • KNEE ARTHROSCOPY     • OH ANRCT XM SURG REQ ANES GENERAL SPI/EDRL DX N/A 12/21/2022    Procedure: EXAM UNDER ANESTHESIA (EUA) biopsy anal lesion, removal of anal foreign body, subcutaneous fistulotomy; Surgeon: Ronna Kat MD;  Location: [facility];  Service: Colorectal   • REPLACEMENT TOTAL KNEE     • TOOTH EXTRACTION  04/2022      Family History:     Family History   Problem Relation Age of Onset   • Heart disease Mother    • Heart disease Father       Social History:     Social History     Socioeconomic History   • Marital status: /Civil Union     Spouse name: None   • Number of children: None   • Years of education: None   • Highest education level: None   Occupational History   • None   Tobacco Use   • Smoking status: Former   • Smokeless tobacco: Never   Vaping Use   • Vaping Use: Never used   Substance and Sexual Activity   • Alcohol use: Not Currently   • Drug use: Never   • Sexual activity: Yes     Partners: Female   Other Topics Concern   • None   Social History Narrative   • None     Social Determinants of Health     Financial Resource Strain: Not on file   Food Insecurity: No Food Insecurity   • Worried About Running Out of Food in the Last Year: Never true   • Ran Out of Food in the Last Year: Never true   Transportation Needs: No Transportation Needs   • Lack of Transportation (Medical): No   • Lack of Transportation (Non-Medical):  No   Physical Activity: Not on file   Stress: Not on file   Social Connections: Not on file   Intimate Partner Violence: Not on file   Housing Stability: Low Risk    • Unable to Pay for Housing in the Last Year: No   • Number of Places Lived in the Last Year: 1   • Unstable Housing in the Last Year: No      Medications and Allergies:     Current Outpatient Medications   Medication Sig Dispense Refill   • acetaminophen (TYLENOL) 500 mg tablet Take 500 mg by mouth 3 (three) times a day     • amLODIPine (NORVASC) 5 mg tablet daily     • doxycycline hyclate (VIBRA-TABS) 100 mg tablet      • lisinopril (ZESTRIL) 5 mg tablet Take 5 mg by mouth daily     • metFORMIN (GLUCOPHAGE-XR) 500 mg 24 hr tablet Take 2 tablets (1,000 mg total) by mouth 2 (two) times a day with meals 2 tabs 2 times daily (Patient taking differently: Take 1,000 mg by mouth 2 (two) times a day with meals) 200 tablet 1   • Multiple Vitamins-Minerals (MULTIVITAMIN ADULT PO) Take 1 tablet by mouth daily     • oxybutynin (DITROPAN-XL) 10 MG 24 hr tablet Take 10 mg by mouth daily at bedtime     • repaglinide (PRANDIN) 1 mg tablet Take 1 tablet by mouth daily with dinner     • simvastatin (ZOCOR) 20 mg tablet Take 20 mg by mouth daily at bedtime     • warfarin (COUMADIN) 7 5 mg tablet Take 7 5 mg by mouth daily       • Cholecalciferol 25 MCG (1000 UT) tablet Take 1,000 Units by mouth daily       No current facility-administered medications for this visit  No Known Allergies   Immunizations:     Immunization History   Administered Date(s) Administered   • COVID-19 MODERNA VACC 0 5 ML IM 01/22/2021, 03/01/2021, 11/03/2021, 08/09/2022, 08/09/2022   • INFLUENZA 09/01/2020, 09/20/2022   • Influenza, high dose seasonal 0 7 mL 10/02/2020, 10/16/2021, 09/20/2022   • Influenza, seasonal, injectable 10/13/2010   • Pneumococcal Conjugate 13-Valent 09/27/2016   • Pneumococcal Polysaccharide PPV23 01/01/2006   • influenza, trivalent, adjuvanted 09/30/2019, 10/02/2020, 10/16/2021      Health Maintenance:         Topic Date Due   • Colorectal Cancer Screening  11/18/2027   • Hepatitis C Screening  Completed         Topic Date Due   • Pneumococcal Vaccine: 65+ Years (3 - PPSV23 if available, else PCV20) 09/27/2017   • COVID-19 Vaccine (6 - Booster for [place] Fudge series) 10/04/2022      Medicare Screening Tests and Risk Assessments:     Hernan Jean is here for his Subsequent Wellness visit  Last Medicare Wellness visit information reviewed, patient interviewed and updates made to the record today  Health Risk Assessment:   Patient rates overall health as good  Patient feels that their physical health rating is same  Patient is satisfied with their life  Eyesight was rated as same  Hearing was rated as same   Patient feels that their emotional and mental health rating is same  Patients states they are never, rarely angry  Patient states they are never, rarely unusually tired/fatigued  Pain experienced in the last 7 days has been none  Patient states that he has experienced no weight loss or gain in last 6 months  Depression Screening:   PHQ-2 Score: 0      Fall Risk Screening: In the past year, patient has experienced: no history of falling in past year      Home Safety:  Patient does not have trouble with stairs inside or outside of their home  Patient has working smoke alarms and has working carbon monoxide detector  Home safety hazards include: none  Nutrition:   Current diet is Regular  Medications:   Patient is currently taking over-the-counter supplements  OTC medications include: see medication list  Patient is able to manage medications  Activities of Daily Living (ADLs)/Instrumental Activities of Daily Living (IADLs):   Walk and transfer into and out of bed and chair?: Yes  Dress and groom yourself?: Yes    Bathe or shower yourself?: Yes    Feed yourself?  Yes  Do your laundry/housekeeping?: Yes  Manage your money, pay your bills and track your expenses?: Yes  Make your own meals?: Yes    Do your own shopping?: Yes    Previous Hospitalizations:   Any hospitalizations or ED visits within the last 12 months?: No      Advance Care Planning:   Living will: Yes    Advanced directive: Yes      PREVENTIVE SCREENINGS      Cardiovascular Screening:    General: Screening Not Indicated and History Lipid Disorder      Diabetes Screening:     General: Screening Not Indicated and History Diabetes      Colorectal Cancer Screening:     General: Screening Current      Prostate Cancer Screening:    General: Screening Not Indicated      Abdominal Aortic Aneurysm (AAA) Screening:    Risk factors include: tobacco use        Lung Cancer Screening:     General: Screening Not Indicated      Hepatitis C Screening:    General: "Screening Current    Screening, Brief Intervention, and Referral to Treatment (SBIRT)    Screening  Typical number of drinks in a day: 0  Typical number of drinks in a week: 0  Interpretation: Low risk drinking behavior  Single Item Drug Screening:  How often have you used an illegal drug (including marijuana) or a prescription medication for non-medical reasons in the past year? never    Single Item Drug Screen Score: 0  Interpretation: Negative screen for possible drug use disorder    No results found       Physical Exam:     /70 (BP Location: Left arm, Patient Position: Sitting, Cuff Size: Standard)   Pulse 63   Temp (!) 96 7 °F (35 9 °C) (Tympanic)   Resp 18   Ht 6' 2\" (1 88 m)   Wt 82 1 kg (181 lb)   SpO2 99%   BMI 23 24 kg/m²     Physical Exam     Rosie Beard MD  "

## 2023-07-31 ENCOUNTER — RA CDI HCC (OUTPATIENT)
Dept: OTHER | Facility: HOSPITAL | Age: 83
End: 2023-07-31

## 2023-07-31 NOTE — PROGRESS NOTES
[address] [facility] coding opportunities        E11.22, E11.69, E11.42, E11.3293 and I49.5  Chart Reviewed number of suggestions sent to Provider: 5     Patients Insurance     Medicare Insurance: Medicare

## 2023-08-04 ENCOUNTER — OFFICE VISIT (OUTPATIENT)
Age: 83
End: 2023-08-04
Payer: MEDICARE

## 2023-08-04 VITALS
WEIGHT: 185.8 LBS | DIASTOLIC BLOOD PRESSURE: 68 MMHG | SYSTOLIC BLOOD PRESSURE: 112 MMHG | BODY MASS INDEX: 23.85 KG/M2 | TEMPERATURE: 97.5 F | HEIGHT: 74 IN | RESPIRATION RATE: 17 BRPM | HEART RATE: 67 BPM | OXYGEN SATURATION: 97 %

## 2023-08-04 DIAGNOSIS — Z23 ENCOUNTER FOR IMMUNIZATION: ICD-10-CM

## 2023-08-04 DIAGNOSIS — E11.65 TYPE 2 DIABETES MELLITUS WITH HYPERGLYCEMIA, WITHOUT LONG-TERM CURRENT USE OF INSULIN (HCC): Primary | ICD-10-CM

## 2023-08-04 DIAGNOSIS — E11.59 HYPERTENSION ASSOCIATED WITH DIABETES (HCC): ICD-10-CM

## 2023-08-04 DIAGNOSIS — I15.2 HYPERTENSION ASSOCIATED WITH DIABETES (HCC): ICD-10-CM

## 2023-08-04 DIAGNOSIS — E53.8 B12 DEFICIENCY: ICD-10-CM

## 2023-08-04 DIAGNOSIS — N18.31 STAGE 3A CHRONIC KIDNEY DISEASE (HCC): ICD-10-CM

## 2023-08-04 DIAGNOSIS — I49.5 SSS (SICK SINUS SYNDROME) (HCC): ICD-10-CM

## 2023-08-04 LAB
LEFT EYE DIABETIC RETINOPATHY: NORMAL
LEFT EYE IMAGE QUALITY: NORMAL
LEFT EYE MACULAR EDEMA: NORMAL
LEFT EYE OTHER RETINOPATHY: NORMAL
RIGHT EYE DIABETIC RETINOPATHY: NORMAL
RIGHT EYE IMAGE QUALITY: NORMAL
RIGHT EYE MACULAR EDEMA: NORMAL
RIGHT EYE OTHER RETINOPATHY: NORMAL
SEVERITY (EYE EXAM): NORMAL

## 2023-08-04 PROCEDURE — 92250 FUNDUS PHOTOGRAPHY W/I&R: CPT | Performed by: INTERNAL MEDICINE

## 2023-08-04 PROCEDURE — 99214 OFFICE O/P EST MOD 30 MIN: CPT | Performed by: INTERNAL MEDICINE

## 2023-08-04 NOTE — PATIENT INSTRUCTIONS
An 61-year-old with a few diagnoses which are well controlled; he is doing quite well  We will continue to follow with [facility] endocrinology. Sees Dr. Julio Rowell with internal medicine yearly or as needed.

## 2023-08-04 NOTE — PROGRESS NOTES
Assessment/Plan:       Diagnoses and all orders for this visit:    Type 2 diabetes mellitus with hyperglycemia, without long-term current use of insulin ([address])    Hypertension associated with diabetes ([address])  -     IRIS Diabetic eye exam    Encounter for immunization    SSS (sick sinus syndrome) ([address])    Stage 3a chronic kidney disease ([address])    B12 deficiency                Subjective:      Patient ID: Darrell North is a 82 y.o. male. In 2022 he was hospitalized  for GI bleed. He had red blood per rectum. Did not need a blood transfusion . Hemoglobin hematocrit really did not drop at all. Colonoscopy done identify 3 anal nodules and 1 sigmoid polyp. Also, extensive sigmoid diverticulosis noted. Subsequently went for surgical excision of an anal tag. No malignancy. Back pain has resolved    Diabetic, followed by [address] [place] [address] endocrinology. Doing quite well on metformin plus Prandin with recent hemoglobin A1c of 7.0. 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 History of sick sinus syndrome. He has a pacemaker. This was placed in 2016. 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 He fibrillating so is on anticoagulation anyway. Right now on Coumadin because he cannot afford Eliquis. Globally feels well. Mom  from stroke, father  from heart related illness. Both a relatively young ages. Urological health followed by Dr. Delfino Nunez. The patient had bilateral cataract surgery. He had kidney stones. He has diverticulosis. A recent EKG done at [facility] shows 100% pacing.       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 ,  does not have any pertinent problems on file. ,   has a past surgical history that includes Replacement total knee; Cataract extraction; Colonoscopy; Cystostomy; EGD AND COLONOSCOPY; Anal manometry (10/06/2015); Cardiac pacemaker placement; Tooth extraction (04/2022); Joint replacement; Kidney stone surgery; Knee arthroscopy; and pr anrct xm surg req anes general spi/edrl dx (N/A, 12/21/2022). ,  family history includes Heart disease in his father and mother. ,   reports that he has quit smoking. He has never used smokeless tobacco. He reports that he does not currently use alcohol. He reports that he does not use drugs. ,  has No Known Allergies. .  Current Outpatient Medications   Medication Sig Dispense Refill   • acetaminophen (TYLENOL) 500 mg tablet Take 500 mg by mouth 3 (three) times a day     • amLODIPine (NORVASC) 5 mg tablet daily     • Cholecalciferol 25 MCG (1000 UT) tablet Take 1,000 Units by mouth daily     • lisinopril (ZESTRIL) 5 mg tablet Take 5 mg by mouth daily     • metFORMIN (GLUCOPHAGE-XR) 500 mg 24 hr tablet Take 2 tablets (1,000 mg total) by mouth 2 (two) times a day with meals 2 tabs 2 times daily (Patient taking differently: Take 1,000 mg by mouth 2 (two) times a day with meals) 200 tablet 1   • Multiple Vitamins-Minerals (MULTIVITAMIN ADULT PO) Take 1 tablet by mouth daily     • oxybutynin (DITROPAN-XL) 10 MG 24 hr tablet Take 10 mg by mouth daily at bedtime     • repaglinide (PRANDIN) 1 mg tablet Take 1 tablet by mouth daily with dinner     • simvastatin (ZOCOR) 20 mg tablet Take 20 mg by mouth daily at bedtime     • warfarin (COUMADIN) 7.5 mg tablet Take 7.5 mg by mouth daily         No current facility-administered medications for this visit. Review of Systems   Musculoskeletal: Positive for arthralgias.    All other systems reviewed and are negative. Objective:  Vitals:    08/04/23 0828   BP: 112/68   Pulse: 67   Resp: 17   Temp: 97.5 °F (36.4 °C)   SpO2: 97%      Physical Exam  Constitutional:       Comments: A male patient who appears younger than stated age   Eyes:      General: No scleral icterus. Cardiovascular:      Rate and Rhythm: Normal rate and regular rhythm. Pulmonary:      Effort: Pulmonary effort is normal.      Breath sounds: Normal breath sounds. No wheezing or rales. Chest:      Chest wall: No tenderness. Musculoskeletal:         General: Normal range of motion. Neurological:      General: No focal deficit present. Mental Status: He is alert. Psychiatric:         Mood and Affect: Mood normal.         Judgment: Judgment normal.           Patient Instructions   An 82year-old with a few diagnoses which are well controlled; he is doing quite well  We will continue to follow with [facility] endocrinology. Sees Dr. Nicanor Childs with internal medicine yearly or as needed.

## 2023-10-11 ENCOUNTER — TELEPHONE (OUTPATIENT)
Dept: NEPHROLOGY | Facility: CLINIC | Age: 83
End: 2023-10-11

## 2023-10-11 NOTE — TELEPHONE ENCOUNTER
I called and spoke to the patient wife Lee Whitney and schedule the patient for his 15 month nephrology follow up appointment with Dr. Marisa Maldonado from our recall list. The patient wife Lee Whitney understood and was okay with the day and time of the patient next appointment.

## 2023-10-18 ENCOUNTER — CLINICAL SUPPORT (OUTPATIENT)
Age: 83
End: 2023-10-18

## 2023-10-18 DIAGNOSIS — Z23 ENCOUNTER FOR IMMUNIZATION: Primary | ICD-10-CM

## 2024-03-12 ENCOUNTER — TELEPHONE (OUTPATIENT)
Age: 84
End: 2024-03-12

## 2024-03-12 DIAGNOSIS — N18.31 STAGE 3A CHRONIC KIDNEY DISEASE (HCC): Primary | ICD-10-CM

## 2024-03-12 NOTE — TELEPHONE ENCOUNTER
Leslie called bc Zaheer has an upcoming appointment and they would like to get his labs done on 3/19/24 with his other labs that are ordered. His current lab slip  in February.   Can you reorder labs and leave at office from desk for patient to ?   Patient's wife would like a call when lab slip is done so she can pick it up when she goes to [facility]. Thank you.

## 2024-03-28 ENCOUNTER — TELEPHONE (OUTPATIENT)
Dept: NEPHROLOGY | Facility: CLINIC | Age: 84
End: 2024-03-28

## 2024-03-29 ENCOUNTER — OFFICE VISIT (OUTPATIENT)
Dept: NEPHROLOGY | Facility: CLINIC | Age: 84
End: 2024-03-29
Payer: MEDICARE

## 2024-03-29 VITALS
RESPIRATION RATE: 16 BRPM | BODY MASS INDEX: 23.1 KG/M2 | HEART RATE: 64 BPM | TEMPERATURE: 96.9 F | WEIGHT: 180 LBS | DIASTOLIC BLOOD PRESSURE: 70 MMHG | OXYGEN SATURATION: 100 % | SYSTOLIC BLOOD PRESSURE: 110 MMHG | HEIGHT: 74 IN

## 2024-03-29 DIAGNOSIS — E83.9 CHRONIC KIDNEY DISEASE-MINERAL AND BONE DISORDER: ICD-10-CM

## 2024-03-29 DIAGNOSIS — N18.31 STAGE 3A CHRONIC KIDNEY DISEASE (HCC): Primary | ICD-10-CM

## 2024-03-29 DIAGNOSIS — N18.9 CHRONIC KIDNEY DISEASE-MINERAL AND BONE DISORDER: ICD-10-CM

## 2024-03-29 DIAGNOSIS — I48.20 CHRONIC ATRIAL FIBRILLATION (HCC): ICD-10-CM

## 2024-03-29 DIAGNOSIS — I15.2 HYPERTENSION ASSOCIATED WITH DIABETES  (HCC): ICD-10-CM

## 2024-03-29 DIAGNOSIS — R80.9 ALBUMINURIA: ICD-10-CM

## 2024-03-29 DIAGNOSIS — E11.42 DIABETIC POLYNEUROPATHY ASSOCIATED WITH TYPE 2 DIABETES MELLITUS (HCC): ICD-10-CM

## 2024-03-29 DIAGNOSIS — E11.59 HYPERTENSION ASSOCIATED WITH DIABETES  (HCC): ICD-10-CM

## 2024-03-29 DIAGNOSIS — M89.9 CHRONIC KIDNEY DISEASE-MINERAL AND BONE DISORDER: ICD-10-CM

## 2024-03-29 PROCEDURE — 99214 OFFICE O/P EST MOD 30 MIN: CPT | Performed by: INTERNAL MEDICINE

## 2024-03-29 NOTE — ASSESSMENT & PLAN NOTE
Lab Results   Component Value Date    EGFR 61 03/19/2024    EGFR 62 03/19/2024    EGFR 55 (L) 10/27/2023    CREATININE 1.19 03/19/2024    CREATININE 1.18 03/19/2024    CREATININE 1.30 10/27/2023   PTH and phosphorus along with vitamin D are within acceptable range and will continue to monitor

## 2024-03-29 NOTE — ASSESSMENT & PLAN NOTE
Lab Results   Component Value Date    HGBA1C 7.1 (H) 03/19/2024   Blood pressure is quite well-controlled.  Diabetes is also reasonable well-controlled.  Importance of blood pressure and diabetic control discussed with the patient

## 2024-03-29 NOTE — PROGRESS NOTES
NEPHROLOGY OFFICE FOLLOW UP  Zaheer Vivar 84 y.o. male MRN: [id number]    Encounter: [id number] 3/29/2024    REASON FOR VISIT: Zaheer Vivar is a 84 y.o. male who is here on 3/29/2024 for Chronic Kidney Disease and Follow-up  .    HPI:    Zaheer came in today for follow-up of CKD.  84-year-old gentleman who is doing well overall and denies any acute complaint    No chest pain no palpitation    No nausea no vomiting    Denies any urinary complaint        REVIEW OF SYSTEMS:    Review of Systems   Constitutional:  Negative for fatigue.   HENT:  Negative for congestion.    Eyes:  Negative for photophobia and pain.   Respiratory:  Negative for chest tightness and shortness of breath.    Cardiovascular:  Negative for chest pain and palpitations.   Gastrointestinal:  Negative for abdominal distention, abdominal pain and blood in stool.   Endocrine: Negative for polydipsia.   Genitourinary:  Negative for difficulty urinating, dysuria, flank pain, hematuria and urgency.   Musculoskeletal:  Negative for arthralgias and back pain.   Skin:  Negative for rash.   Neurological:  Negative for dizziness, light-headedness and headaches.   Hematological:  Does not bruise/bleed easily.   Psychiatric/Behavioral:  Negative for behavioral problems. The patient is not nervous/anxious.          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)     Diabetes mellitus (HCC)     Diverticulosis     History of cardiac cath     Hyperlipidemia     Hypertension     Kidney stone     SSS (sick sinus syndrome) (HCC)        PAST SURGICAL HISTORY:  Past Surgical History:   Procedure Laterality Date    ANAL MANOMETRY  10/06/2015    CARDIAC PACEMAKER PLACEMENT      CATARACT EXTRACTION      COLONOSCOPY      CYSTOSTOMY      EGD AND COLONOSCOPY      JOINT REPLACEMENT      KIDNEY STONE SURGERY      KNEE ARTHROSCOPY      FL ANRCT XM  SURG REQ ANES GENERAL SPI/EDRL DX N/A 12/21/2022    Procedure: EXAM UNDER ANESTHESIA (EUA) biopsy anal lesion, removal of anal foreign body, subcutaneous fistulotomy;  Surgeon: Johan Webster MD;  [facility]: [facility];  Service: Colorectal    REPLACEMENT TOTAL KNEE      TOOTH EXTRACTION  04/2022       SOCIAL HISTORY:  Social History     Substance and Sexual Activity   Alcohol Use Not Currently     Social History     Substance and Sexual Activity   Drug Use Never     Social History     Tobacco Use   Smoking Status Former   Smokeless Tobacco Never       FAMILY HISTORY:  Family History   Problem Relation Age of Onset    Heart disease Mother     Heart disease Father        MEDICATIONS:    Current Outpatient Medications:     acetaminophen (TYLENOL) 500 mg tablet, Take 500 mg by mouth 3 (three) times a day, Disp: , Rfl:     amLODIPine (NORVASC) 5 mg tablet, daily, Disp: , Rfl:     Cholecalciferol 25 MCG (1000 UT) tablet, Take 1,000 Units by mouth daily, Disp: , Rfl:     lisinopril (ZESTRIL) 5 mg tablet, Take 5 mg by mouth daily, Disp: , Rfl:     metFORMIN (GLUCOPHAGE-XR) 500 mg 24 hr tablet, Take 2 tablets (1,000 mg total) by mouth 2 (two) times a day with meals 2 tabs 2 times daily (Patient taking differently: Take 1,000 mg by mouth 2 (two) times a day with meals), Disp: 200 tablet, Rfl: 1    Multiple Vitamins-Minerals (MULTIVITAMIN ADULT PO), Take 1 tablet by mouth daily, Disp: , Rfl:     oxybutynin (DITROPAN-XL) 10 MG 24 hr tablet, Take 10 mg by mouth daily at bedtime, Disp: , Rfl:     repaglinide (PRANDIN) 1 mg tablet, Take 1 tablet by mouth daily with dinner, Disp: , Rfl:     simvastatin (ZOCOR) 20 mg tablet, Take 20 mg by mouth daily at bedtime, Disp: , Rfl:     warfarin (COUMADIN) 7.5 mg tablet, Take 7.5 mg by mouth daily  , Disp: , Rfl:     PHYSICAL EXAM:  Vitals:    03/29/24 1044   BP: 110/70   BP Location: Right arm   Patient Position: Sitting   Pulse: 64   Resp: 16   Temp: (!) 96.9 °F (36.1 °C)  "  TempSrc: Temporal   SpO2: 100%   Weight: 81.6 kg (180 lb)   Height: 6' 2\" (1.88 m)     Body mass index is 23.11 kg/m².    Physical Exam  Constitutional:       General: He is not in acute distress.     Appearance: He is well-developed.   HENT:      Head: Normocephalic.      Mouth/Throat:      Mouth: Mucous membranes are moist.   Eyes:      General: No scleral icterus.     Conjunctiva/sclera: Conjunctivae normal.   Neck:      Vascular: No JVD.   Cardiovascular:      Rate and Rhythm: Normal rate.      Heart sounds: Normal heart sounds.   Pulmonary:      Effort: Pulmonary effort is normal.      Breath sounds: No wheezing.   Abdominal:      Palpations: Abdomen is soft.      Tenderness: There is no abdominal tenderness.   Musculoskeletal:         General: Normal range of motion.      Cervical back: Neck supple.   Skin:     General: Skin is warm.      Findings: No rash.   Neurological:      Mental Status: He is alert and oriented to person, place, and time.   Psychiatric:         Behavior: Behavior normal.         LAB RESULTS:  Results for orders placed or performed in visit on 23   [facility] Diabetic eye exam   Result Value Ref Range    Severity NORMAL     Right Eye Diabetic Retinopathy None     Right Eye Macular Edema None     Right Eye Other Retinopathy None     Right Eye Image Quality Gradable Image     Left Eye Diabetic Retinopathy None     Left Eye Macular Edema None     Left Eye Other Retinopathy None     Left Eye Image Quality Gradable Image     Result Retinal Study Result for DENISE FRANKLIN     Result       Result        saravanan MUNOZ 82 y/o, M (: 1940, MRN: [id number])    Result        presented to [facility] on 2023 for a retinal imaging study of the left and right eyes.    Result       Result        Based on the findings of the study, the following is recommended for DENISE FRANKLIN    Result        Normal Study: Re-scan the patient in 12 months or in the next " calendar year.    Result       Result        Interpreting Provider's Comments:  No comments provided    Result        Diagnoses Present:  - Type 2 diabetes mellitus with other circulatory complications    Result       Result        Right eye findings: Negative for Diabetic Retinopathy    Result Negative for Macular Edema     Result       Result        Left eye findings: Negative for Diabetic Retinopathy    Result Negative for Macular Edema     Result       Result        This result was electronically signed by Brianna Mueller MD, NPI: [id number], Taxonomy: [id number] on 08- 15:24 [facility].    Result      Result       NOTE:  Any pathology noted on this diabetic retinal evaluation should be confirmed by an appropriate ophthalmic examination.       ASSESSMENT and PLAN:      Stage 3a chronic kidney disease (HCC)  Lab Results   Component Value Date    EGFR 61 03/19/2024    EGFR 62 03/19/2024    EGFR 55 (L) 10/27/2023    CREATININE 1.19 03/19/2024    CREATININE 1.18 03/19/2024    CREATININE 1.30 10/27/2023   Renal function quite stable overall with GFR range of 55.  Advise hydration and avoiding nephrotoxic medication    Chronic kidney disease-mineral and bone disorder  Lab Results   Component Value Date    EGFR 61 03/19/2024    EGFR 62 03/19/2024    EGFR 55 (L) 10/27/2023    CREATININE 1.19 03/19/2024    CREATININE 1.18 03/19/2024    CREATININE 1.30 10/27/2023   PTH and phosphorus along with vitamin D are within acceptable range and will continue to monitor    Hypertension associated with diabetes (HCC)    Lab Results   Component Value Date    HGBA1C 7.1 (H) 03/19/2024   Blood pressure is quite well-controlled.  Diabetes is also reasonable well-controlled.  Importance of blood pressure and diabetic control discussed with the patient      Everything discussed with the patient at length.  I will see him back in 6 months.  Will get blood and urine test before that visit        Portions of the record may have been  "created with voice recognition software. Occasional wrong word or \"sound a like\" substitutions may have occurred due to the inherent limitations of voice recognition software. Read the chart carefully and recognize, using context, where substitutions have occurred.If you have any questions, please contact the dictating provider.   "

## 2024-03-29 NOTE — ASSESSMENT & PLAN NOTE
Lab Results   Component Value Date    EGFR 61 03/19/2024    EGFR 62 03/19/2024    EGFR 55 (L) 10/27/2023    CREATININE 1.19 03/19/2024    CREATININE 1.18 03/19/2024    CREATININE 1.30 10/27/2023   Renal function quite stable overall with GFR range of 55.  Advise hydration and avoiding nephrotoxic medication

## 2024-04-09 ENCOUNTER — HOSPITAL ENCOUNTER (EMERGENCY)
Facility: HOSPITAL | Age: 84
Discharge: HOME | End: 2024-04-09
Attending: EMERGENCY MEDICINE
Payer: MEDICARE

## 2024-04-09 VITALS
HEART RATE: 60 BPM | RESPIRATION RATE: 19 BRPM | OXYGEN SATURATION: 100 % | TEMPERATURE: 98.6 F | DIASTOLIC BLOOD PRESSURE: 71 MMHG | SYSTOLIC BLOOD PRESSURE: 139 MMHG

## 2024-04-09 DIAGNOSIS — R42 LIGHTHEADEDNESS: Primary | ICD-10-CM

## 2024-04-09 LAB
ALBUMIN SERPL BCP-MCNC: 3.8 G/DL (ref 3.5–5)
ALP SERPL-CCNC: 53 U/L (ref 34–104)
ALT SERPL W P-5'-P-CCNC: 16 U/L (ref 7–52)
ANION GAP SERPL CALCULATED.3IONS-SCNC: 7 MMOL/L (ref 4–13)
APTT PPP: 59 SECONDS (ref 23–37)
AST SERPL W P-5'-P-CCNC: 13 U/L (ref 13–39)
BASOPHILS # BLD AUTO: 0.03 THOUSANDS/ÂΜL (ref 0–0.1)
BASOPHILS NFR BLD AUTO: 1 % (ref 0–1)
BILIRUB SERPL-MCNC: 0.57 MG/DL (ref 0.2–1)
BUN SERPL-MCNC: 13 MG/DL (ref 5–25)
CALCIUM SERPL-MCNC: 8.8 MG/DL (ref 8.4–10.2)
CARDIAC TROPONIN I PNL SERPL HS: 4 NG/L
CHLORIDE SERPL-SCNC: 106 MMOL/L (ref 96–108)
CO2 SERPL-SCNC: 28 MMOL/L (ref 21–32)
CREAT SERPL-MCNC: 1.11 MG/DL (ref 0.6–1.3)
EOSINOPHIL # BLD AUTO: 0.06 THOUSAND/ÂΜL (ref 0–0.61)
EOSINOPHIL NFR BLD AUTO: 1 % (ref 0–6)
ERYTHROCYTE [DISTWIDTH] IN BLOOD BY AUTOMATED COUNT: 13.6 % (ref 11.6–15.1)
GFR SERPL CREATININE-BSD FRML MDRD: 61 ML/MIN/1.73SQ M
GLUCOSE SERPL-MCNC: 113 MG/DL (ref 65–140)
HCT VFR BLD AUTO: 41 % (ref 36.5–49.3)
HGB BLD-MCNC: 13.1 G/DL (ref 12–17)
IMM GRANULOCYTES # BLD AUTO: 0 THOUSAND/UL (ref 0–0.2)
IMM GRANULOCYTES NFR BLD AUTO: 0 % (ref 0–2)
INR PPP: 3.05 (ref 0.84–1.19)
LYMPHOCYTES # BLD AUTO: 1.71 THOUSANDS/ÂΜL (ref 0.6–4.47)
LYMPHOCYTES NFR BLD AUTO: 33 % (ref 14–44)
MCH RBC QN AUTO: 27.1 PG (ref 26.8–34.3)
MCHC RBC AUTO-ENTMCNC: 32 G/DL (ref 31.4–37.4)
MCV RBC AUTO: 85 FL (ref 82–98)
MONOCYTES # BLD AUTO: 0.32 THOUSAND/ÂΜL (ref 0.17–1.22)
MONOCYTES NFR BLD AUTO: 6 % (ref 4–12)
NEUTROPHILS # BLD AUTO: 3.14 THOUSANDS/ÂΜL (ref 1.85–7.62)
NEUTS SEG NFR BLD AUTO: 59 % (ref 43–75)
NRBC BLD AUTO-RTO: 0 /100 WBCS
PLATELET # BLD AUTO: 336 THOUSANDS/UL (ref 149–390)
PMV BLD AUTO: 10.2 FL (ref 8.9–12.7)
POTASSIUM SERPL-SCNC: 4.1 MMOL/L (ref 3.5–5.3)
PROT SERPL-MCNC: 6.7 G/DL (ref 6.4–8.4)
PROTHROMBIN TIME: 32.6 SECONDS (ref 11.6–14.5)
RBC # BLD AUTO: 4.83 MILLION/UL (ref 3.88–5.62)
SODIUM SERPL-SCNC: 141 MMOL/L (ref 135–147)
WBC # BLD AUTO: 5.26 THOUSAND/UL (ref 4.31–10.16)

## 2024-04-09 PROCEDURE — 93005 ELECTROCARDIOGRAM TRACING: CPT

## 2024-04-09 PROCEDURE — 85610 PROTHROMBIN TIME: CPT | Performed by: EMERGENCY MEDICINE

## 2024-04-09 PROCEDURE — 36415 COLL VENOUS BLD VENIPUNCTURE: CPT | Performed by: EMERGENCY MEDICINE

## 2024-04-09 PROCEDURE — 85025 COMPLETE CBC W/AUTO DIFF WBC: CPT | Performed by: EMERGENCY MEDICINE

## 2024-04-09 PROCEDURE — 85730 THROMBOPLASTIN TIME PARTIAL: CPT | Performed by: EMERGENCY MEDICINE

## 2024-04-09 PROCEDURE — 84484 ASSAY OF TROPONIN QUANT: CPT | Performed by: EMERGENCY MEDICINE

## 2024-04-09 PROCEDURE — 80053 COMPREHEN METABOLIC PANEL: CPT | Performed by: EMERGENCY MEDICINE

## 2024-04-09 RX ORDER — MECLIZINE HYDROCHLORIDE 25 MG/1
25 TABLET ORAL 3 TIMES DAILY PRN
Qty: 30 TABLET | Refills: 0 | Status: SHIPPED | OUTPATIENT
Start: 2024-04-09

## 2024-04-09 RX ORDER — MECLIZINE HYDROCHLORIDE 25 MG/1
25 TABLET ORAL ONCE
Status: COMPLETED | OUTPATIENT
Start: 2024-04-09 | End: 2024-04-09

## 2024-04-09 RX ADMIN — SODIUM CHLORIDE 500 ML: 0.9 INJECTION, SOLUTION INTRAVENOUS at 12:21

## 2024-04-09 RX ADMIN — MECLIZINE HYDROCHLORIDE 25 MG: 25 TABLET ORAL at 12:20

## 2024-04-09 NOTE — DISCHARGE INSTRUCTIONS
Please follow up PCP and cardiology. Recommend tylenol 650 mg and ibuprofen 600 mg every 6 hours as needed for pain. Please return for severe chest pain, significant shortness of breath, severely worsening symptoms, or any other concerning signs or symptoms. Please refer to the following documents for additional instructions and return precautions.

## 2024-04-09 NOTE — ED PROVIDER NOTES
History  Chief Complaint   Patient presents with    Dizziness     Pt c/o dizziness x 4 days, pt states feeling lightheaded in the mornings and after going up flight of stairs, reports high BP reading at home (140/70)      84-year-old male history of sick sinus syndrome, diabetes, hypertension, DVT on warfarin presenting with lightheadedness.  Patient reports feeling lightheaded for the past couple of days and having occasional room spinning dizziness just after waking in the morning that resolves on its own shortly thereafter.  Patient reports symptoms began after recent trip to [place] and getting off the plane after return home.  Denies any chest pain shortness of breath.  Patient reports intermittent positional lightheadedness.  Reports his blood pressure at home has been normal.  Taking medications normally.  Denies any other complaints.  Chart reviewed.    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 Taking?   Cholecalciferol 25 MCG (1000 UT) tablet  Self, Spouse/Significant Other Yes No   Sig: Take 1,000 Units by mouth daily   Multiple Vitamins-Minerals (MULTIVITAMIN ADULT PO)  Self, Spouse/Significant Other Yes No   Sig: Take 1 tablet by mouth daily   acetaminophen (TYLENOL) 500 mg tablet  Self, Spouse/Significant Other Yes No   Sig: Take 500 mg by mouth 3 (three) times a day   amLODIPine (NORVASC) 5 mg tablet  Self, Spouse/Significant Other Yes No   Sig: daily   lisinopril (ZESTRIL) 5 mg tablet  Self, Spouse/Significant Other Yes No   Sig: Take 5 mg by mouth daily   metFORMIN  (GLUCOPHAGE-XR) 500 mg 24 hr tablet  Self, Spouse/Significant Other No No   Sig: Take 2 tablets (1,000 mg total) by mouth 2 (two) times a day with meals 2 tabs 2 times daily   Patient taking differently: Take 1,000 mg by mouth 2 (two) times a day with meals   oxybutynin (DITROPAN-XL) 10 MG 24 hr tablet  Self, Spouse/Significant Other Yes No   Sig: Take 10 mg by mouth daily at bedtime   repaglinide (PRANDIN) 1 mg tablet  Self, Spouse/Significant Other Yes No   Sig: Take 1 tablet by mouth daily with dinner   simvastatin (ZOCOR) 20 mg tablet  Spouse/Significant Other, Self Yes No   Sig: Take 20 mg by mouth daily at bedtime   warfarin (COUMADIN) 7.5 mg tablet  Self, Spouse/Significant Other Yes No   Sig: Take 7.5 mg by mouth daily        Facility-Administered Medications: None       Past Medical History:   Diagnosis Date    Chronic kidney disease     Colon polyp 11/2022    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)     Diabetes mellitus (HCC)     Diverticulosis     History of cardiac cath     Hyperlipidemia     Hypertension     Kidney stone     SSS (sick sinus syndrome) (HCC)        Past Surgical History:   Procedure Laterality Date    ANAL MANOMETRY  10/06/2015    CARDIAC PACEMAKER PLACEMENT      CATARACT EXTRACTION      COLONOSCOPY      CYSTOSTOMY      EGD AND COLONOSCOPY      JOINT REPLACEMENT      KIDNEY STONE SURGERY      KNEE ARTHROSCOPY      DE ANRCT XM SURG REQ ANES GENERAL SPI/EDRL DX N/A 12/21/2022    Procedure: EXAM UNDER ANESTHESIA (EUA) biopsy anal lesion, removal of anal foreign body, subcutaneous fistulotomy;  Surgeon: Johan Webster MD;  Location: [facility];  Service: Colorectal    REPLACEMENT TOTAL KNEE      TOOTH EXTRACTION  04/2022       Family History   Problem Relation Age of Onset    Heart disease Mother     Heart disease Father      I have reviewed and agree with the history as documented.    E-Cigarette/Vaping    E-Cigarette Use Never User      E-Cigarette/Vaping Substances     Nicotine No     THC No     CBD No     Flavoring No     Other No     Unknown No      Social History     Tobacco Use    Smoking status: Former    Smokeless tobacco: Never   Vaping Use    Vaping status: Never Used   Substance Use Topics    Alcohol use: Not Currently    Drug use: Never       Review of Systems   Constitutional:  Negative for appetite change, chills, diaphoresis, fever and unexpected weight change.   HENT:  Negative for congestion and rhinorrhea.    Eyes:  Negative for photophobia and visual disturbance.   Respiratory:  Negative for cough, chest tightness and shortness of breath.    Cardiovascular:  Negative for chest pain, palpitations and leg swelling.   Gastrointestinal:  Negative for abdominal distention, abdominal pain, blood in stool, constipation, diarrhea, nausea and vomiting.   Genitourinary:  Negative for dysuria and hematuria.   Musculoskeletal:  Negative for back pain, joint swelling, neck pain and neck stiffness.   Skin:  Negative for color change, pallor, rash and wound.   Neurological:  Positive for light-headedness. Negative for dizziness, syncope, weakness and headaches.   Psychiatric/Behavioral:  Negative for agitation.    All other systems reviewed and are negative.      Physical Exam  Physical Exam  Vitals and nursing note reviewed.   Constitutional:       General: He is not in acute distress.     Appearance: Normal appearance. He is well-developed. He is not ill-appearing, toxic-appearing or diaphoretic.   HENT:      Head: Normocephalic and atraumatic.      Nose: Nose normal. No congestion or rhinorrhea.      Mouth/Throat:      Mouth: Mucous membranes are moist.      Pharynx: Oropharynx is clear. No oropharyngeal exudate or posterior oropharyngeal erythema.   Eyes:      General: No scleral icterus.        Right eye: No discharge.         Left eye: No discharge.      Extraocular Movements: Extraocular movements intact.      Conjunctiva/sclera: Conjunctivae normal.      Pupils: Pupils  are equal, round, and reactive to light.   Neck:      Vascular: No JVD.      Trachea: No tracheal deviation.      Comments: Supple. Normal range of motion.   Cardiovascular:      Rate and Rhythm: Normal rate and regular rhythm.      Heart sounds: Normal heart sounds. No murmur heard.     No friction rub. No gallop.      Comments: Normal rate and regular rhythm  Pulmonary:      Effort: Pulmonary effort is normal. No respiratory distress.      Breath sounds: Normal breath sounds. No stridor. No wheezing or rales.      Comments: Clear to auscultation bilaterally  Chest:      Chest wall: No tenderness.   Abdominal:      General: Bowel sounds are normal. There is no distension.      Palpations: Abdomen is soft.      Tenderness: There is no abdominal tenderness. There is no right CVA tenderness, left CVA tenderness, guarding or rebound.      Comments: Soft, nontender, nondistended.  Normal bowel sounds throughout   Musculoskeletal:         General: No swelling, tenderness, deformity or signs of injury. Normal range of motion.      Cervical back: Normal range of motion and neck supple. No rigidity. No muscular tenderness.      Right lower leg: No edema.      Left lower leg: No edema.   Lymphadenopathy:      Cervical: No cervical adenopathy.   Skin:     General: Skin is warm and dry.      Coloration: Skin is not pale.      Findings: No erythema or rash.   Neurological:      General: No focal deficit present.      Mental Status: He is alert. Mental status is at baseline.      Sensory: No sensory deficit.      Motor: No weakness or abnormal muscle tone.      Coordination: Coordination normal.      Gait: Gait normal.      Comments: Alert.  Strength and sensation grossly intact.  Ambulatory without difficulty at baseline.    Psychiatric:         Behavior: Behavior normal.         Thought Content: Thought content normal.         Vital Signs  ED Triage Vitals [04/09/24 1130]   Temperature Pulse Respirations Blood Pressure SpO2    98.6 °F (37 °C) 80 16 (!) 178/73 100 %      Temp Source Heart Rate Source Patient Position - Orthostatic VS BP Location FiO2 (%)   Temporal Monitor Sitting Left arm --      Pain Score       No Pain           Vitals:    04/09/24 1130 04/09/24 1200 04/09/24 1230   BP: (!) 178/73 154/76 139/71   Pulse: 80 60 60   Patient Position - Orthostatic VS: Sitting Lying Sitting         Visual Acuity  Visual Acuity      Flowsheet Row Most Recent Value   L Pupil Size (mm) 3   R Pupil Size (mm) 3            ED Medications  Medications   sodium chloride 0.9 % bolus 500 mL (500 mL Intravenous New Bag 4/9/24 1221)   meclizine (ANTIVERT) tablet 25 mg (25 mg Oral Given 4/9/24 1220)       Diagnostic Studies  Results Reviewed       Procedure Component Value Units Date/Time    HS Troponin 0hr (reflex protocol) [[id number]]  (Normal) Collected: 04/09/24 1239    Lab Status: Final result Specimen: Blood from Arm, Left Updated: 04/09/24 1317     hs TnI 0hr 4 ng/L     Comprehensive metabolic panel [[id number]] Collected: 04/09/24 1239    Lab Status: Final result Specimen: Blood from Arm, Left Updated: 04/09/24 1310     Sodium 141 mmol/L      Potassium 4.1 mmol/L      Chloride 106 mmol/L      CO2 28 mmol/L      ANION GAP 7 mmol/L      BUN 13 mg/dL      Creatinine 1.11 mg/dL      Glucose 113 mg/dL      Calcium 8.8 mg/dL      AST 13 U/L      ALT 16 U/L      Alkaline Phosphatase 53 U/L      Total Protein 6.7 g/dL      Albumin 3.8 g/dL      Total Bilirubin 0.57 mg/dL      eGFR 61 ml/min/1.73sq m     Narrative:      National Kidney Disease Foundation guidelines for Chronic Kidney Disease (CKD):     Stage 1 with normal or high GFR (GFR > 90 mL/min/1.73 square meters)    Stage 2 Mild CKD (GFR = 60-89 mL/min/1.73 square meters)    Stage 3A Moderate CKD (GFR = 45-59 mL/min/1.73 square meters)    Stage 3B Moderate CKD (GFR = 30-44 mL/min/1.73 square meters)    Stage 4 Severe CKD (GFR = 15-29 mL/min/1.73 square meters)    Stage 5 End Stage CKD (GFR <15  mL/min/1.73 square meters)  Note: GFR calculation is accurate only with a steady state creatinine    Protime-INR [[id number]]  (Abnormal) Collected: 04/09/24 1216    Lab Status: Final result Specimen: Blood from Arm, Left Updated: 04/09/24 1241     Protime 32.6 seconds      INR 3.05    APTT [[id number]]  (Abnormal) Collected: 04/09/24 1216    Lab Status: Final result Specimen: Blood from Arm, Left Updated: 04/09/24 1241     PTT 59 seconds     CBC and differential [[id number]] Collected: 04/09/24 1216    Lab Status: Final result Specimen: Blood from Arm, Left Updated: 04/09/24 1228     WBC 5.26 Thousand/uL      RBC 4.83 Million/uL      Hemoglobin 13.1 g/dL      Hematocrit 41.0 %      MCV 85 fL      MCH 27.1 pg      MCHC 32.0 g/dL      RDW 13.6 %      MPV 10.2 fL      Platelets 336 Thousands/uL      nRBC 0 /100 WBCs      Segmented % 59 %      Immature Grans % 0 %      Lymphocytes % 33 %      Monocytes % 6 %      Eosinophils Relative 1 %      Basophils Relative 1 %      Absolute Neutrophils 3.14 Thousands/µL      Absolute Immature Grans 0.00 Thousand/uL      Absolute Lymphocytes 1.71 Thousands/µL      Absolute Monocytes 0.32 Thousand/µL      Eosinophils Absolute 0.06 Thousand/µL      Basophils Absolute 0.03 Thousands/µL                    No orders to display              Procedures  Procedures         ED Course                                             Medical Decision Making  84-year-old male history of sick sinus syndrome, diabetes, hypertension, DVT on warfarin presenting with lightheadedness.  Positional lightheadedness with positive orthostatic vital signs suspicious for orthostasis.  Will give fluid bolus.  Card evaluation including EKG and troponin.  Basic labs.  Will interrogate pacemaker device.  Reassess.    Labs interpreted by me without significant acute process.  Symptoms improved with fluids.  Device interrogation without significant acute process. Discussed results and recommendations. Advised follow up  PCP. Medication recommendations. Given instructions and return precautions. Patient/family at bedside acknowledged understanding of all written and verbal instructions and return precautions. Discharged.     Amount and/or Complexity of Data Reviewed  Labs: ordered.             Disposition  Final diagnoses:   Lightheadedness     Time reflects when diagnosis was documented in both MDM as applicable and the Disposition within this note       Time User Action Codes Description Comment    4/9/2024  1:26 PM Alexander Naik Add [R42] Lightheadedness           ED Disposition       ED Disposition   Discharge    Condition   Stable    Date/Time   Tue Apr 9, 2024  1:28 PM    Comment   Zaheer Vivar discharge to home/self care.                   Follow-up Information       Follow up With Specialties Details Why Contact Info Additional Information    Yolande Miller MD Internal Medicine Schedule an appointment as soon as possible for a visit in 1 week  [address] [place] [address]  [address]nd Floor  [facility] [address] [phone]       [facility] Cardiology Schedule an appointment as soon as possible for a visit in 1 week  [address] [address] [phone] [facility], [facility] [address] [address], [place], [place], [id number] [phone]            Patient's Medications   Discharge Prescriptions    MECLIZINE (ANTIVERT) 25 MG TABLET    Take 1 tablet (25 mg total) by mouth 3 (three) times a day as needed for dizziness       Start Date: 4/9/2024  End Date: --       Order Dose: 25 mg       Quantity: 30 tablet    Refills: 0       No discharge procedures on file.    PDMP Review         Value Time User    PDMP Reviewed  Yes 12/21/2022 10:01 AM Johan Webster MD            ED Provider  Electronically Signed by             Alexander Naik MD  04/09/24 1328       Alexander Naik MD  04/09/24 1322

## 2024-04-10 ENCOUNTER — VBI (OUTPATIENT)
Age: 84
End: 2024-04-10

## 2024-04-10 LAB
ATRIAL RATE: 78 BPM
P AXIS: 36 DEGREES
PR INTERVAL: 134 MS
QRS AXIS: 47 DEGREES
QRSD INTERVAL: 80 MS
QT INTERVAL: 360 MS
QTC INTERVAL: 410 MS
T WAVE AXIS: 18 DEGREES
VENTRICULAR RATE: 78 BPM

## 2024-04-10 PROCEDURE — 93010 ELECTROCARDIOGRAM REPORT: CPT | Performed by: INTERNAL MEDICINE

## 2024-04-10 NOTE — TELEPHONE ENCOUNTER
04/10/24 10:02 AM    Patient contacted post ED visit, [facility] department spoke with patient/caregiver and outreach was successful.    Thank you.  Rishi Reynoso  [facility]

## 2024-10-16 ENCOUNTER — TELEPHONE (OUTPATIENT)
Age: 84
End: 2024-10-16

## 2024-10-16 NOTE — TELEPHONE ENCOUNTER
Patients wife asking if upcoming labs are fasting. Informed her yes, fasting for 8 hour minimum.  She expressed understanding.  No further action needed

## 2024-10-21 ENCOUNTER — TELEPHONE (OUTPATIENT)
Dept: NEPHROLOGY | Facility: CLINIC | Age: 84
End: 2024-10-21

## 2024-10-21 NOTE — TELEPHONE ENCOUNTER
Called spoke with patient and patient's spouse quincy advised patient to get fasting labs done 1 week prior to 11/05/24 scheduled follow-up appointment with . advised patient as a reminder that labs / testing will need to be done in the appropriate time for there scheduled appointment as there health is very important for us. Both patient and spouse verbalized understanding and are okay with it.

## 2024-10-29 LAB
25(OH)D3+25(OH)D2 SERPL-MCNC: 72 NG/ML (ref 30–100)
ANION GAP SERPL CALCULATED.3IONS-SCNC: 9 MMOL/L (ref 3–11)
BASOPHILS # BLD AUTO: 0.1 THOU/CMM (ref 0–0.1)
BASOPHILS NFR BLD AUTO: 1 %
BUN SERPL-MCNC: 14 MG/DL (ref 7–28)
CALCIUM SERPL-MCNC: 9.2 MG/DL (ref 8.5–10.5)
CHLORIDE SERPL-SCNC: 104 MMOL/L (ref 100–109)
CO2 SERPL-SCNC: 27 MMOL/L (ref 21–31)
CREAT SERPL-MCNC: 1.27 MG/DL (ref 0.53–1.3)
CREAT UR-MCNC: 95.7 MG/DL (ref 50–200)
CYTOLOGY CMNT CVX/VAG CYTO-IMP: ABNORMAL
DIFFERENTIAL METHOD BLD: ABNORMAL
EOSINOPHIL # BLD AUTO: 0.1 THOU/CMM (ref 0–0.5)
EOSINOPHIL NFR BLD AUTO: 1 %
ERYTHROCYTE [DISTWIDTH] IN BLOOD BY AUTOMATED COUNT: 15 % (ref 12–16)
GFR/BSA.PRED SERPLBLD CYS-BASED-ARV: 56 ML/MIN/{1.73_M2}
GLUCOSE SERPL-MCNC: 105 MG/DL (ref 65–99)
GLUCOSE UR QL STRIP: NEGATIVE MG/DL
HCT VFR BLD AUTO: 38.2 % (ref 37–48)
HGB BLD-MCNC: 12.7 G/DL (ref 12.5–17)
HGB UR QL STRIP: NEGATIVE MG/DL
KETONES UR QL STRIP: NEGATIVE MG/DL
LEGIONELLA SPEC CULT: NORMAL
LEUKOCYTE ESTERASE UR QL STRIP: NEGATIVE /UL
LYMPHOCYTES # BLD AUTO: 1.6 THOU/CMM (ref 1–3)
LYMPHOCYTES NFR BLD AUTO: 30 %
MCH RBC QN AUTO: 27.8 PG (ref 27–36)
MCHC RBC AUTO-ENTMCNC: 33.2 G/DL (ref 32–37)
MCV RBC AUTO: 84 FL (ref 80–100)
METAMYELOCYTES NFR BLD MANUAL: 1 %
MONOCYTES # BLD AUTO: 0.3 THOU/CMM (ref 0.3–1)
MONOCYTES NFR BLD AUTO: 6 %
MORPHOLOGY BLD-IMP: ABNORMAL
NEUTROPHILS # BLD AUTO: 3.4 THOU/CMM (ref 1.8–7.8)
NEUTROPHILS NFR BLD AUTO: 61 %
NITRITE UR QL STRIP: NEGATIVE
PH UR: 5 [PH] (ref 4.5–8)
PHOSPHATE SERPL-MCNC: 2.8 MG/DL (ref 2.3–4.6)
PLATELET # BLD AUTO: 287 THOU/CMM (ref 140–350)
PMV BLD REES-ECKER: 8.4 FL (ref 7.5–11.3)
POTASSIUM SERPL-SCNC: 4.5 MMOL/L (ref 3.5–5.2)
PROT 24H UR-MRATE: NEGATIVE MG/DL
PROT UR-MCNC: 19 MG/DL
PROT/CREAT UR: 0.2
RBC # BLD AUTO: 4.55 MILL/CMM (ref 4–5.4)
SL AMB POCT URINE COMMENT: NORMAL
SODIUM SERPL-SCNC: 140 MMOL/L (ref 135–145)
SP GR UR: 1.01 (ref 1–1.03)
WBC # BLD AUTO: 5.3 THOU/CMM (ref 4–10.5)

## 2024-10-30 LAB — PTH-INTACT SERPL-MCNC: 37.4 PG/ML (ref 12–88)

## 2024-11-05 ENCOUNTER — OFFICE VISIT (OUTPATIENT)
Dept: NEPHROLOGY | Facility: CLINIC | Age: 84
End: 2024-11-05
Payer: MEDICARE

## 2024-11-05 VITALS
HEART RATE: 73 BPM | DIASTOLIC BLOOD PRESSURE: 60 MMHG | BODY MASS INDEX: 23.64 KG/M2 | WEIGHT: 184.2 LBS | TEMPERATURE: 97.5 F | OXYGEN SATURATION: 100 % | HEIGHT: 74 IN | SYSTOLIC BLOOD PRESSURE: 120 MMHG | RESPIRATION RATE: 18 BRPM

## 2024-11-05 DIAGNOSIS — N18.9 CHRONIC KIDNEY DISEASE-MINERAL AND BONE DISORDER: ICD-10-CM

## 2024-11-05 DIAGNOSIS — M89.9 CHRONIC KIDNEY DISEASE-MINERAL AND BONE DISORDER: ICD-10-CM

## 2024-11-05 DIAGNOSIS — I48.20 CHRONIC ATRIAL FIBRILLATION (HCC): ICD-10-CM

## 2024-11-05 DIAGNOSIS — N18.31 STAGE 3A CHRONIC KIDNEY DISEASE (HCC): Primary | ICD-10-CM

## 2024-11-05 DIAGNOSIS — E83.9 CHRONIC KIDNEY DISEASE-MINERAL AND BONE DISORDER: ICD-10-CM

## 2024-11-05 DIAGNOSIS — E11.42 DIABETIC POLYNEUROPATHY ASSOCIATED WITH TYPE 2 DIABETES MELLITUS (HCC): ICD-10-CM

## 2024-11-05 PROCEDURE — 99214 OFFICE O/P EST MOD 30 MIN: CPT | Performed by: INTERNAL MEDICINE

## 2024-11-05 RX ORDER — ATORVASTATIN CALCIUM 20 MG/1
TABLET, FILM COATED ORAL
COMMUNITY
Start: 2024-10-31

## 2024-11-05 NOTE — PROGRESS NOTES
Ambulatory Visit  Name: Zhaeer Vivar      : 1940      MRN: [id number]  Encounter Provider: Mohinder Ruiz MD  Encounter Date: 2024   Encounter department: [facility]    Assessment & Plan  Stage 3a chronic kidney disease (HCC)  Lab Results   Component Value Date    EGFR 56 (L) 10/29/2024    EGFR 56 (L) 10/29/2024    EGFR 56 (L) 10/01/2024    CREATININE 1.27 10/29/2024    CREATININE 1.27 10/29/2024    CREATININE 1.27 10/01/2024   Renal function is quite stable.  Advise hydration and avoiding nephrotoxic medication         Chronic kidney disease-mineral and bone disorder  Lab Results   Component Value Date    EGFR 56 (L) 10/29/2024    EGFR 56 (L) 10/29/2024    EGFR 56 (L) 10/01/2024    CREATININE 1.27 10/29/2024    CREATININE 1.27 10/29/2024    CREATININE 1.27 10/01/2024   PTH and phosphorus along with vitamin D are within acceptable range and will continue to monitor         Chronic atrial fibrillation (HCC)  Rate is well-controlled.  Being monitored by cardiologist       Diabetic polyneuropathy associated with type 2 diabetes mellitus (HCC)    Lab Results   Component Value Date    HGBA1C 7.4 (H) 10/01/2024   Reasonably well-controlled.  Importance of diabetic control and effect on kidney disease discussed with the patient             Everything including blood test and medication discussed with the patient.  I will see him back in 1 year as kidney function is quite stable.  Will get blood and urine test before that visit        History of Present Illness     Zaheer Vivar is a 84 y.o. male who presents CKD    Overall doing well    No acute complaint    Does get dizzy episode off-and-on    Cardiology monitoring and doing further testing as needed    No chest pain no palpitation      Review of Systems   Constitutional:  Negative for fatigue.   HENT:  Negative for congestion.    Eyes:  Negative for photophobia and pain.   Respiratory:  Negative for chest  "tightness and shortness of breath.    Cardiovascular:  Negative for chest pain and palpitations.   Gastrointestinal:  Negative for abdominal distention, abdominal pain and blood in stool.   Endocrine: Negative for polydipsia.   Genitourinary:  Negative for difficulty urinating, dysuria, flank pain, hematuria and urgency.   Musculoskeletal:  Negative for arthralgias and back pain.   Skin:  Negative for rash.   Neurological:  Positive for dizziness and light-headedness. Negative for headaches.   Hematological:  Does not bruise/bleed easily.   Psychiatric/Behavioral:  Negative for behavioral problems. The patient is not nervous/anxious.            Objective     Pulse 73   Temp 97.5 °F (36.4 °C) (Temporal)   Resp 18   Ht 6' 2\" (1.88 m)   Wt 83.6 kg (184 lb 3.2 oz)   SpO2 100%   BMI 23.65 kg/m²     Physical Exam  Constitutional:       General: He is not in acute distress.     Appearance: He is well-developed.   HENT:      Head: Normocephalic.      Mouth/Throat:      Mouth: Mucous membranes are moist.   Eyes:      General: No scleral icterus.     Conjunctiva/sclera: Conjunctivae normal.   Neck:      Vascular: No JVD.   Cardiovascular:      Rate and Rhythm: Normal rate.      Heart sounds: Normal heart sounds.   Pulmonary:      Effort: Pulmonary effort is normal.      Breath sounds: No wheezing.   Abdominal:      Palpations: Abdomen is soft.      Tenderness: There is no abdominal tenderness.   Musculoskeletal:         General: Normal range of motion.      Cervical back: Neck supple.   Skin:     General: Skin is warm.      Findings: No rash.   Neurological:      Mental Status: He is alert and oriented to person, place, and time.   Psychiatric:         Behavior: Behavior normal.       "

## 2024-11-05 NOTE — ASSESSMENT & PLAN NOTE
Lab Results   Component Value Date    HGBA1C 7.4 (H) 10/01/2024   Reasonably well-controlled.  Importance of diabetic control and effect on kidney disease discussed with the patient             Everything including blood test and medication discussed with the patient.  I will see him back in 1 year as kidney function is quite stable.  Will get blood and urine test before that visit

## 2024-11-05 NOTE — ASSESSMENT & PLAN NOTE
Lab Results   Component Value Date    EGFR 56 (L) 10/29/2024    EGFR 56 (L) 10/29/2024    EGFR 56 (L) 10/01/2024    CREATININE 1.27 10/29/2024    CREATININE 1.27 10/29/2024    CREATININE 1.27 10/01/2024   Renal function is quite stable.  Advise hydration and avoiding nephrotoxic medication

## 2024-11-05 NOTE — ASSESSMENT & PLAN NOTE
Lab Results   Component Value Date    EGFR 56 (L) 10/29/2024    EGFR 56 (L) 10/29/2024    EGFR 56 (L) 10/01/2024    CREATININE 1.27 10/29/2024    CREATININE 1.27 10/29/2024    CREATININE 1.27 10/01/2024   PTH and phosphorus along with vitamin D are within acceptable range and will continue to monitor

## 2025-06-10 ENCOUNTER — TELEPHONE (OUTPATIENT)
Dept: NEPHROLOGY | Facility: CLINIC | Age: 85
End: 2025-06-10

## 2025-06-10 NOTE — TELEPHONE ENCOUNTER
Called spoke with patient's spouse Leslie. Advised patient's current appointment of 11/06/25 has been rescheduled to 11/12/25 @ 9:00am due to provider being on vacation.  Patient spouse Leslie verbalized understanding and it is okay with it

## (undated) DEVICE — GLOVE INDICATOR PI UNDERGLOVE SZ 7.5 BLUE

## (undated) DEVICE — SPONGE STICK WITH PVP-I: Brand: KENDALL

## (undated) DEVICE — 3M™ DURAPORE™ SURGICAL TAPE 1538-3, 3 INCH X 10 YARD (7,5CM X 9,1M), 4 ROLLS/BOX: Brand: 3M™ DURAPORE™

## (undated) DEVICE — GOWN ,SIRUS ,NONREINFORCED 4XL: Brand: MEDLINE

## (undated) DEVICE — BETHLEHEM UNIVERSAL MINOR GEN: Brand: CARDINAL HEALTH

## (undated) DEVICE — NEEDLE HYPO 22G X 1-1/2 IN

## (undated) DEVICE — SUT VICRYL 3-0 SH 27 IN J416H

## (undated) DEVICE — PAD GROUNDING ADULT

## (undated) DEVICE — TUBING SUCTION 5MM X 12 FT

## (undated) DEVICE — GAUZE SPONGES,16 PLY: Brand: CURITY

## (undated) DEVICE — 3000CC GUARDIAN II: Brand: GUARDIAN

## (undated) DEVICE — INTENDED FOR TISSUE SEPARATION, AND OTHER PROCEDURES THAT REQUIRE A SHARP SURGICAL BLADE TO PUNCTURE OR CUT.: Brand: BARD-PARKER SAFETY BLADES SIZE 15, STERILE

## (undated) DEVICE — DISPOSABLE BRIEF/UNDERWEAR

## (undated) DEVICE — LUBRICANT SURGILUBE TUBE 4 OZ  FLIP TOP

## (undated) DEVICE — SUT VICRYL PLUS 0 UR-6 27IN VCP603H

## (undated) DEVICE — GLOVE SRG BIOGEL 7.5

## (undated) DEVICE — BASIC SINGLE BASIN 2-LF: Brand: MEDLINE INDUSTRIES, INC.

## (undated) DEVICE — PLUMEPEN PRO 10FT

## (undated) DEVICE — ALL PURPOSE SPONGES,NONWOVEN, 4 PLY: Brand: CURITY

## (undated) DEVICE — POOLE SUCTION HANDLE: Brand: CARDINAL HEALTH